# Patient Record
Sex: FEMALE | Race: BLACK OR AFRICAN AMERICAN | Employment: OTHER | URBAN - METROPOLITAN AREA
[De-identification: names, ages, dates, MRNs, and addresses within clinical notes are randomized per-mention and may not be internally consistent; named-entity substitution may affect disease eponyms.]

---

## 2017-11-01 ENCOUNTER — APPOINTMENT (OUTPATIENT)
Dept: CT IMAGING | Age: 82
End: 2017-11-01
Attending: EMERGENCY MEDICINE
Payer: MEDICARE

## 2017-11-01 ENCOUNTER — HOSPITAL ENCOUNTER (EMERGENCY)
Age: 82
Discharge: HOME | End: 2017-11-01
Attending: EMERGENCY MEDICINE
Payer: MEDICARE

## 2017-11-01 VITALS
HEART RATE: 66 BPM | RESPIRATION RATE: 16 BRPM | DIASTOLIC BLOOD PRESSURE: 68 MMHG | SYSTOLIC BLOOD PRESSURE: 132 MMHG | TEMPERATURE: 98.4 F | OXYGEN SATURATION: 100 %

## 2017-11-01 DIAGNOSIS — S09.90XA CLOSED HEAD INJURY, INITIAL ENCOUNTER: Primary | ICD-10-CM

## 2017-11-01 DIAGNOSIS — S00.83XA CONTUSION OF FACE, INITIAL ENCOUNTER: ICD-10-CM

## 2017-11-01 DIAGNOSIS — E86.0 DEHYDRATION: ICD-10-CM

## 2017-11-01 LAB
ALBUMIN SERPL-MCNC: 3.3 G/DL (ref 3.5–5)
ALBUMIN/GLOB SERPL: 1 {RATIO} (ref 1.1–2.2)
ALP SERPL-CCNC: 72 U/L (ref 45–117)
ALT SERPL-CCNC: 20 U/L (ref 12–78)
ANION GAP SERPL CALC-SCNC: 3 MMOL/L (ref 5–15)
APPEARANCE UR: CLEAR
AST SERPL-CCNC: 40 U/L (ref 15–37)
BACTERIA URNS QL MICRO: NEGATIVE /HPF
BASOPHILS # BLD: 0 K/UL (ref 0–0.1)
BASOPHILS NFR BLD: 0 % (ref 0–1)
BILIRUB SERPL-MCNC: 1.5 MG/DL (ref 0.2–1)
BILIRUB UR QL: NEGATIVE
BUN SERPL-MCNC: 33 MG/DL (ref 6–20)
BUN/CREAT SERPL: 32 (ref 12–20)
CALCIUM SERPL-MCNC: 8.5 MG/DL (ref 8.5–10.1)
CHLORIDE SERPL-SCNC: 112 MMOL/L (ref 97–108)
CO2 SERPL-SCNC: 27 MMOL/L (ref 21–32)
COLOR UR: ABNORMAL
CREAT SERPL-MCNC: 1.04 MG/DL (ref 0.55–1.02)
EOSINOPHIL # BLD: 0.2 K/UL (ref 0–0.4)
EOSINOPHIL NFR BLD: 2 % (ref 0–7)
EPITH CASTS URNS QL MICRO: ABNORMAL /LPF
ERYTHROCYTE [DISTWIDTH] IN BLOOD BY AUTOMATED COUNT: 15.6 % (ref 11.5–14.5)
GLOBULIN SER CALC-MCNC: 3.3 G/DL (ref 2–4)
GLUCOSE SERPL-MCNC: 124 MG/DL (ref 65–100)
GLUCOSE UR STRIP.AUTO-MCNC: 500 MG/DL
HCT VFR BLD AUTO: 38.3 % (ref 35–47)
HGB BLD-MCNC: 12.8 G/DL (ref 11.5–16)
HGB UR QL STRIP: NEGATIVE
HYALINE CASTS URNS QL MICRO: ABNORMAL /LPF (ref 0–5)
KETONES UR QL STRIP.AUTO: NEGATIVE MG/DL
LEUKOCYTE ESTERASE UR QL STRIP.AUTO: NEGATIVE
LYMPHOCYTES # BLD: 1.9 K/UL (ref 0.8–3.5)
LYMPHOCYTES NFR BLD: 22 % (ref 12–49)
MCH RBC QN AUTO: 30.7 PG (ref 26–34)
MCHC RBC AUTO-ENTMCNC: 33.4 G/DL (ref 30–36.5)
MCV RBC AUTO: 91.8 FL (ref 80–99)
MONOCYTES # BLD: 0.8 K/UL (ref 0–1)
MONOCYTES NFR BLD: 9 % (ref 5–13)
NEUTS SEG # BLD: 6 K/UL (ref 1.8–8)
NEUTS SEG NFR BLD: 67 % (ref 32–75)
NITRITE UR QL STRIP.AUTO: NEGATIVE
PH UR STRIP: 7 [PH] (ref 5–8)
PLATELET # BLD AUTO: 141 K/UL (ref 150–400)
POTASSIUM SERPL-SCNC: 5.1 MMOL/L (ref 3.5–5.1)
PROT SERPL-MCNC: 6.6 G/DL (ref 6.4–8.2)
PROT UR STRIP-MCNC: 100 MG/DL
RBC # BLD AUTO: 4.17 M/UL (ref 3.8–5.2)
RBC #/AREA URNS HPF: ABNORMAL /HPF (ref 0–5)
SODIUM SERPL-SCNC: 142 MMOL/L (ref 136–145)
SP GR UR REFRACTOMETRY: 1.02 (ref 1–1.03)
UROBILINOGEN UR QL STRIP.AUTO: 0.2 EU/DL (ref 0.2–1)
WBC # BLD AUTO: 8.9 K/UL (ref 3.6–11)
WBC URNS QL MICRO: ABNORMAL /HPF (ref 0–4)

## 2017-11-01 PROCEDURE — 85025 COMPLETE CBC W/AUTO DIFF WBC: CPT | Performed by: EMERGENCY MEDICINE

## 2017-11-01 PROCEDURE — 80053 COMPREHEN METABOLIC PANEL: CPT | Performed by: EMERGENCY MEDICINE

## 2017-11-01 PROCEDURE — 81001 URINALYSIS AUTO W/SCOPE: CPT | Performed by: EMERGENCY MEDICINE

## 2017-11-01 PROCEDURE — 74011250636 HC RX REV CODE- 250/636: Performed by: EMERGENCY MEDICINE

## 2017-11-01 PROCEDURE — 72125 CT NECK SPINE W/O DYE: CPT

## 2017-11-01 PROCEDURE — 99285 EMERGENCY DEPT VISIT HI MDM: CPT

## 2017-11-01 PROCEDURE — 70486 CT MAXILLOFACIAL W/O DYE: CPT

## 2017-11-01 PROCEDURE — 36415 COLL VENOUS BLD VENIPUNCTURE: CPT | Performed by: EMERGENCY MEDICINE

## 2017-11-01 PROCEDURE — 70450 CT HEAD/BRAIN W/O DYE: CPT

## 2017-11-01 PROCEDURE — 74011250637 HC RX REV CODE- 250/637: Performed by: EMERGENCY MEDICINE

## 2017-11-01 PROCEDURE — 96360 HYDRATION IV INFUSION INIT: CPT

## 2017-11-01 RX ORDER — SODIUM CHLORIDE 9 MG/ML
500 INJECTION, SOLUTION INTRAVENOUS ONCE
Status: COMPLETED | OUTPATIENT
Start: 2017-11-01 | End: 2017-11-01

## 2017-11-01 RX ORDER — ACETAMINOPHEN 325 MG/1
650 TABLET ORAL
Status: COMPLETED | OUTPATIENT
Start: 2017-11-01 | End: 2017-11-01

## 2017-11-01 RX ADMIN — ACETAMINOPHEN 650 MG: 325 TABLET ORAL at 13:35

## 2017-11-01 RX ADMIN — SODIUM CHLORIDE 500 ML: 900 INJECTION, SOLUTION INTRAVENOUS at 13:34

## 2017-11-01 NOTE — DISCHARGE INSTRUCTIONS
Dehydration: Care Instructions  Your Care Instructions  Dehydration happens when your body loses too much fluid. This might happen when you do not drink enough water or you lose large amounts of fluids from your body because of diarrhea, vomiting, or sweating. Severe dehydration can be life-threatening. Water and minerals called electrolytes help put your body fluids back in balance. Learn the early signs of fluid loss, and drink more fluids to prevent dehydration. Follow-up care is a key part of your treatment and safety. Be sure to make and go to all appointments, and call your doctor if you are having problems. It's also a good idea to know your test results and keep a list of the medicines you take. How can you care for yourself at home? · To prevent dehydration, drink plenty of fluids, enough so that your urine is light yellow or clear like water. Choose water and other caffeine-free clear liquids until you feel better. If you have kidney, heart, or liver disease and have to limit fluids, talk with your doctor before you increase the amount of fluids you drink. · If you do not feel like eating or drinking, try taking small sips of water, sports drinks, or other rehydration drinks. · Get plenty of rest.  To prevent dehydration  · Add more fluids to your diet and daily routine, unless your doctor has told you not to. · During hot weather, drink more fluids. Drink even more fluids if you exercise a lot. Stay away from drinks with alcohol or caffeine. · Watch for the symptoms of dehydration. These include:  ¨ A dry, sticky mouth. ¨ Dark yellow urine, and not much of it. ¨ Dry and sunken eyes. ¨ Feeling very tired. · Learn what problems can lead to dehydration. These include:  ¨ Diarrhea, fever, and vomiting. ¨ Any illness with a fever, such as pneumonia or the flu. ¨ Activities that cause heavy sweating, such as endurance races and heavy outdoor work in hot or humid weather.   ¨ Alcohol or drug abuse or withdrawal.  ¨ Certain medicines, such as cold and allergy pills (antihistamines), diet pills (diuretics), and laxatives. ¨ Certain diseases, such as diabetes, cancer, and heart or kidney disease. When should you call for help? Call 911 anytime you think you may need emergency care. For example, call if:  ? · You passed out (lost consciousness). ?Call your doctor now or seek immediate medical care if:  ? · You are confused and cannot think clearly. ? · You are dizzy or lightheaded, or you feel like you may faint. ? · You have signs of needing more fluids. You have sunken eyes and a dry mouth, and you pass only a little dark urine. ? · You cannot keep fluids down. ? Watch closely for changes in your health, and be sure to contact your doctor if:  ? · You are not making tears. ? · Your skin is very dry and sags slowly back into place after you pinch it. ? · Your mouth and eyes are very dry. Where can you learn more? Go to http://aliza-ale.info/. Enter T589 in the search box to learn more about \"Dehydration: Care Instructions. \"  Current as of: March 20, 2017  Content Version: 11.4  © 9969-9356 [employer]. Care instructions adapted under license by [employer] (which disclaims liability or warranty for this information). If you have questions about a medical condition or this instruction, always ask your healthcare professional. [place] [address] any warranty or liability for your use of this information. Head Injury: Care Instructions  Your Care Instructions    Most injuries to the head are minor. Bumps, cuts, and scrapes on the head and face usually heal well and can be treated the same as injuries to other parts of the body. Although it's rare, once in a while a more serious problem shows up after you are home. So it's good to be on the lookout for symptoms for a day or two.   Follow-up care is a key part of your treatment and safety. Be sure to make and go to all appointments, and call your doctor if you are having problems. It's also a good idea to know your test results and keep a list of the medicines you take. How can you care for yourself at home? · Follow your doctor's instructions. He or she will tell you if you need someone to watch you closely for the next 24 hours or longer. · Take it easy for the next few days or more if you are not feeling well. · Ask your doctor when it's okay for you to go back to activities like driving a car, riding a bike, or operating machinery. When should you call for help? Call 911 anytime you think you may need emergency care. For example, call if:  ? · You have a seizure. ? · You passed out (lost consciousness). ? · You are confused or can't stay awake. ?Call your doctor now or seek immediate medical care if:  ? · You have new or worse vomiting. ? · You feel less alert. ? · You have new weakness or numbness in any part of your body. ? Watch closely for changes in your health, and be sure to contact your doctor if:  ? · You do not get better as expected. ? · You have new symptoms, such as headaches, trouble concentrating, or changes in mood. Where can you learn more? Go to http://aliza-ale.info/. Enter E604 in the search box to learn more about \"Head Injury: Care Instructions. \"  Current as of: October 14, 2016  Content Version: 11.4  © 0119-3214 [employer]. Care instructions adapted under license by [employer] (which disclaims liability or warranty for this information). If you have questions about a medical condition or this instruction, always ask your healthcare professional. [address] any warranty or liability for your use of this information.

## 2017-11-01 NOTE — ED PROVIDER NOTES
[address]  EMERGENCY DEPARTMENT HISTORY AND PHYSICAL EXAM       Date of Service: 11/1/2017   Patient Name: Stanton Brambila   YOB: 1930  Medical Record Number: [id number]    History of Presenting Illness     Chief Complaint   Patient presents with    Fall     pt fell transferring from chair to wheelchair. Pt hit right side of face on wheelchair. No loc noted. History Provided By:  daughter    Additional History:   Stanton Brambila is a 80 y.o. female with PMhx significant for HTN, DM, TIA, and Alzheimer's disease who presents via EMS to the ED with cc of bruising and swelling over her right eye since GLF yesterday (10/31/17). Per daughter, pt fell forward while being transferred from a chair to her wheelchair yesterday, landing on her right eye on linoleum floor; pt's daughter denies pt lost consciousness. She denies pt is currently taking anticoagulants. Daughter denies pt has had recent illness. She denies pt has had cough, fever, chills, or N/V/D. Social Hx: - Tobacco, - EtOH, - Illicit Drugs    Hx is otherwise limited; pt has hx of dementia. Primary Care Provider: Sheng Rosado MD     Past History     Past Medical History:   Past Medical History:   Diagnosis Date    Alzheimer's dementia     DEMENTIA     [facility] [address]     Hypertension     Stroke ([facility] [address] 2011    TIA        Past Surgical History:   Past Surgical History:   Procedure Laterality Date    HX GYN      hysterectomy        Family History:   History reviewed. No pertinent family history. Social History:   Social History   Substance Use Topics    Smoking status: Never Smoker    Smokeless tobacco: Never Used    Alcohol use No        Allergies:    Allergies   Allergen Reactions    Aspirin Other (comments)     Daugher states no allergy    Pcn [Penicillins] Nausea and Vomiting         Review of Systems   Review of Systems   Unable to perform ROS: Dementia       Physical Exam  Physical Exam    Nursing note and vitals reviewed. General appearance: non-toxic, NAD  Eyes: PERRL, EOMI, conjunctiva normal, anicteric sclera  HEENT: mucous membranes moist, oropharynx is clear; periorbital contusion, ecchymosis, and edema; no septal hematoma; no obvious malocclusion; no crepitus  Pulmonary: clear to auscultation bilaterally  Cardiac: normal rate and regular rhythm, no murmurs, gallops, or rubs, 1+DP pulses, 2+ radial pulses  Abdomen: soft, nontender, nondistended, bowel sounds present  MSK: no pre-tibial edema; no step off or deformity noted  Neuro: moves B/L lower extremities to tactile stimuli; awake, pleasantly demented;  symmetric, has difficulty following commands (due to baseline dementia), face symmetric, tongue appears midline  Skin: capillary refill brisk    Medical Decision Making   I am the first provider for this patient. I reviewed the vital signs, available nursing notes, past medical history, past surgical history, family history and social history. Old Medical Records: Old medical records. Nursing notes. Provider Notes:     DDx: facial contusion vs fx, CHI, ICH, UTI, dehydration    ED Course:  12:02 PM  Initial assessment performed. The patients presenting problems have been discussed, and they are in agreement with the care plan formulated and outlined with them. I have encouraged them to ask questions as they arise throughout their visit.       Diagnostic Study Results     Labs -      Recent Results (from the past 12 hour(s))   CBC WITH AUTOMATED DIFF    Collection Time: 11/01/17  1:49 PM   Result Value Ref Range    WBC 8.9 3.6 - 11.0 K/uL    RBC 4.17 3.80 - 5.20 M/uL    HGB 12.8 11.5 - 16.0 g/dL    HCT 38.3 35.0 - 47.0 %    MCV 91.8 80.0 - 99.0 FL    MCH 30.7 26.0 - 34.0 PG    MCHC 33.4 30.0 - 36.5 g/dL    RDW 15.6 (H) 11.5 - 14.5 %    PLATELET 339 (L) 126 - 400 K/uL    NEUTROPHILS 67 32 - 75 %    LYMPHOCYTES 22 12 - 49 %    MONOCYTES 9 5 - 13 % EOSINOPHILS 2 0 - 7 %    BASOPHILS 0 0 - 1 %    ABS. NEUTROPHILS 6.0 1.8 - 8.0 K/UL    ABS. LYMPHOCYTES 1.9 0.8 - 3.5 K/UL    ABS. MONOCYTES 0.8 0.0 - 1.0 K/UL    ABS. EOSINOPHILS 0.2 0.0 - 0.4 K/UL    ABS. BASOPHILS 0.0 0.0 - 0.1 K/UL   METABOLIC PANEL, COMPREHENSIVE    Collection Time: 11/01/17  1:49 PM   Result Value Ref Range    Sodium 142 136 - 145 mmol/L    Potassium 5.1 3.5 - 5.1 mmol/L    Chloride 112 (H) 97 - 108 mmol/L    CO2 27 21 - 32 mmol/L    Anion gap 3 (L) 5 - 15 mmol/L    Glucose 124 (H) 65 - 100 mg/dL    BUN 33 (H) 6 - 20 MG/DL    Creatinine 1.04 (H) 0.55 - 1.02 MG/DL    BUN/Creatinine ratio 32 (H) 12 - 20      GFR est AA >60 >60 ml/min/1.73m2    GFR est non-AA 50 (L) >60 ml/min/1.73m2    Calcium 8.5 8.5 - 10.1 MG/DL    Bilirubin, total 1.5 (H) 0.2 - 1.0 MG/DL    ALT (SGPT) 20 12 - 78 U/L    AST (SGOT) 40 (H) 15 - 37 U/L    Alk. phosphatase 72 45 - 117 U/L    Protein, total 6.6 6.4 - 8.2 g/dL    Albumin 3.3 (L) 3.5 - 5.0 g/dL    Globulin 3.3 2.0 - 4.0 g/dL    A-G Ratio 1.0 (L) 1.1 - 2.2     URINALYSIS W/MICROSCOPIC    Collection Time: 11/01/17  1:59 PM   Result Value Ref Range    Color YELLOW/STRAW      Appearance CLEAR CLEAR      Specific gravity 1.016 1.003 - 1.030      pH (UA) 7.0 5.0 - 8.0      Protein 100 (A) NEG mg/dL    Glucose 500 (A) NEG mg/dL    Ketone NEGATIVE  NEG mg/dL    Bilirubin NEGATIVE  NEG      Blood NEGATIVE  NEG      Urobilinogen 0.2 0.2 - 1.0 EU/dL    Nitrites NEGATIVE  NEG      Leukocyte Esterase NEGATIVE  NEG      WBC 0-4 0 - 4 /hpf    RBC 0-5 0 - 5 /hpf    Epithelial cells FEW FEW /lpf    Bacteria NEGATIVE  NEG /hpf    Hyaline cast 0-2 0 - 5 /lpf       Radiologic Studies -  The following have been ordered and reviewed:  CT Results  (Last 48 hours)               11/01/17 1303  CT HEAD WO CONT Final result    Impression:  Impression:    1. No evidence of acute infarct or intracranial hemorrhage.    2. Periventricular white matter disease is likely secondary to chronic small vessel ischemic changes. 3. Right frontal scalp soft tissue swelling. Narrative: Indication:  fall out of chair, facial trauma        Comparison: CT 8/16/2011       Findings: 5 mm axial images were obtained from the skull base through the   vertex. CT dose reduction was achieved through the use of a standardized protocol   tailored for this examination and automatic exposure control for dose   modulation. The ventricles and cortical sulci are prominent, compatible with age related   volume loss. There is no evidence of intracranial hemorrhage, mass, mass effect,   or acute infarct. There is periventricular white matter disease. No extra-axial   fluid collections are seen. The visualized paranasal sinuses and mastoid air   cells are clear. The orbital structures are unremarkable. No osseous   abnormalities are seen. There is right frontal scalp soft tissue swelling. 11/01/17 1303  CT MAXILLOFACIAL WO CONT Final result    Impression:  IMPRESSION: Right frontal and supraorbital scalp soft tissue swelling. No   underlying fracture. Narrative:  EXAM:  CT MAXILLOFACIAL WO CONT       INDICATION:   Pain, max-facial; R FACIAL TRAUMA, SWELLING AROUND R ORBIT       COMPARISON:  None. CONTRAST:   None. TECHNIQUE:  Multislice helical CT of the facial bones was performed in the axial   plane without intravenous contrast administration. Coronal and sagittal   reformations were generated. CT dose reduction was achieved through use of a   standardized protocol tailored for this examination and automatic exposure   control for dose modulation. FINDINGS: Study is technically limited by patient motion. There is no facial fracture or other osseous abnormality. There is right frontal   and supraorbital scalp soft tissue swelling. The visualized paranasal sinuses and mastoid air cells are clear.        The globes, optic nerves and extraocular muscles are normal.       No abnormalities are identified within the visualized portions of the brain or   nasopharynx. 11/01/17 1303  CT SPINE CERV WO CONT Final result    Impression:  IMPRESSION:       1. No acute osseous abnormality. 2. Multilevel degenerative spondylosis. Narrative:  INDICATION:   FALL, DEMENTIA, EVAL FOR C SPINE INJURY       COMPARISON: None. TECHNIQUE:   Noncontrast axial CT imaging of the cervical spine was performed. Coronal and sagittal reconstructions were obtained. CT dose reduction was achieved through the use of a standardized protocol   tailored for this examination and automatic exposure control for dose   modulation. FINDINGS:       There is no evidence of acute osseous abnormality. There is no acute alignment   abnormality. Vertebral body heights are maintained. There is no appreciable   prevertebral soft tissue swelling or epidural hematoma. There is multilevel   degenerative spondylosis. There is multilevel neuroforaminal and central canal   stenosis. Evaluation of the paraspinal soft tissues demonstrates no significant   pathology. The visualized lung apices are clear. Vital Signs-Reviewed the patient's vital signs. Patient Vitals for the past 12 hrs:   Temp Pulse Resp BP SpO2   11/01/17 1501 - 66 16 132/68 100 %   11/01/17 1457 98.4 °F (36.9 °C) 64 16 165/88 100 %   11/01/17 1406 98.4 °F (36.9 °C) 67 16 154/87 100 %   11/01/17 1201 - 88 15 159/66 100 %       Medications Given in the ED:  Medications   0.9% sodium chloride infusion 500 mL (0 mL IntraVENous IV Completed 11/1/17 1418)   acetaminophen (TYLENOL) tablet 650 mg (650 mg Oral Given 11/1/17 1335)       Pulse Oximetry Analysis - Normal 100% on RA     Diagnosis   Clinical Impression:   1. Closed head injury, initial encounter    2. Contusion of face, initial encounter    3.  Dehydration         Plan:  1:   Follow-up Information     Follow up With Details Comments Contact Info    Alanna Cummings MD Schedule an appointment as soon as possible for a visit in 2 days  [facility] & [address]  [phone]      [facility] EMERGENCY DEPT Go in 1 day If symptoms worsen [address] [place] [address]  [phone]        2:   Current Discharge Medication List      CONTINUE these medications which have NOT CHANGED    Details   levofloxacin (LEVAQUIN) 750 mg tablet Take 1 Tab by mouth daily. Qty: 7 Tab, Refills: 0      glipiZIDE (GLUCOTROL) 5 mg tablet Take 0.5 Tabs by mouth daily (after breakfast). Do not give if not eating. stop taking glyburide. Qty: 30 Tab, Refills: 0      aspirin delayed-release 81 mg tablet Take 81 mg by mouth daily. [facility]) 625 mg tablet Take 1,875 mg by mouth two (2) times daily (with meals). verapamil (CALAN) 240 mg capsule Take 240 mg by mouth daily. hydrochlorothiazide (HYDRODIURIL) 25 mg tablet Take 25 mg by mouth daily. raloxifene (EVISTA) 60 mg tablet Take  by mouth daily. eszopiclone (LUNESTA) 1 mg tablet Take 3 mg by mouth nightly. quetiapine (SEROQUEL) 50 mg tablet Take 50 mg by mouth two (2) times a day. Return to ED if Worse    Disposition Note:  DISCHARGE NOTE:  3:23 PM  The patient is ready for discharge. The patients signs, symptoms, diagnosis, and instructions for discharge have been discussed and the pt has conveyed their understanding. The patient is to follow up as recommended or return to the ER should their symptoms worsen. Plan has been discussed and patient has conveyed their agreement.  _______________________________   Attestations: This note is prepared by Gilma Bucio, acting as Scribe for Merari Leonard. Aneta Kearney MD.    Merari Leonard. Aneta Kearney MD: The scribe's documentation has been prepared under my direction and personally reviewed by me in its entirety.  I confirm that the note above accurately reflects all work, treatment, procedures, and medical decision making performed by me.  _______________________________

## 2017-11-01 NOTE — ED NOTES
Tim Griffith going over dc instructions with pt and family at this time. Pt to dc home with family. No further questions or concerns at this time.

## 2018-01-01 ENCOUNTER — HOSPITAL ENCOUNTER (EMERGENCY)
Age: 83
Discharge: HOME | End: 2018-10-26
Attending: EMERGENCY MEDICINE
Payer: MEDICARE

## 2018-01-01 ENCOUNTER — APPOINTMENT (OUTPATIENT)
Dept: GENERAL RADIOLOGY | Age: 83
End: 2018-01-01
Attending: EMERGENCY MEDICINE
Payer: MEDICARE

## 2018-01-01 ENCOUNTER — APPOINTMENT (OUTPATIENT)
Dept: CT IMAGING | Age: 83
End: 2018-01-01
Attending: EMERGENCY MEDICINE
Payer: MEDICARE

## 2018-01-01 VITALS
SYSTOLIC BLOOD PRESSURE: 151 MMHG | OXYGEN SATURATION: 99 % | HEART RATE: 67 BPM | HEIGHT: 63 IN | BODY MASS INDEX: 26.58 KG/M2 | WEIGHT: 150 LBS | TEMPERATURE: 97.7 F | DIASTOLIC BLOOD PRESSURE: 94 MMHG | RESPIRATION RATE: 14 BRPM

## 2018-01-01 DIAGNOSIS — R41.82 ALTERED MENTAL STATUS, UNSPECIFIED ALTERED MENTAL STATUS TYPE: Primary | ICD-10-CM

## 2018-01-01 DIAGNOSIS — F03.90 DEMENTIA WITHOUT BEHAVIORAL DISTURBANCE, UNSPECIFIED DEMENTIA TYPE: Chronic | ICD-10-CM

## 2018-01-01 DIAGNOSIS — N30.01 ACUTE CYSTITIS WITH HEMATURIA: ICD-10-CM

## 2018-01-01 LAB
ALBUMIN SERPL-MCNC: 3.1 G/DL (ref 3.5–5)
ALBUMIN/GLOB SERPL: 0.7 {RATIO} (ref 1.1–2.2)
ALP SERPL-CCNC: 50 U/L (ref 45–117)
ALT SERPL-CCNC: 12 U/L (ref 12–78)
ANION GAP SERPL CALC-SCNC: 7 MMOL/L (ref 5–15)
APPEARANCE UR: ABNORMAL
AST SERPL-CCNC: 16 U/L (ref 15–37)
BACTERIA SPEC CULT: ABNORMAL
BACTERIA URNS QL MICRO: ABNORMAL /HPF
BASOPHILS # BLD: 0 K/UL (ref 0–0.1)
BASOPHILS NFR BLD: 0 % (ref 0–1)
BILIRUB SERPL-MCNC: 1.4 MG/DL (ref 0.2–1)
BILIRUB UR QL CFM: POSITIVE
BUN SERPL-MCNC: 19 MG/DL (ref 6–20)
BUN/CREAT SERPL: 31 (ref 12–20)
CALCIUM SERPL-MCNC: 8.7 MG/DL (ref 8.5–10.1)
CC UR VC: ABNORMAL
CHLORIDE SERPL-SCNC: 105 MMOL/L (ref 97–108)
CO2 SERPL-SCNC: 26 MMOL/L (ref 21–32)
COLOR UR: ABNORMAL
CREAT SERPL-MCNC: 0.62 MG/DL (ref 0.55–1.02)
DIFFERENTIAL METHOD BLD: ABNORMAL
EOSINOPHIL # BLD: 0 K/UL (ref 0–0.4)
EOSINOPHIL NFR BLD: 0 % (ref 0–7)
EPITH CASTS URNS QL MICRO: ABNORMAL /LPF
ERYTHROCYTE [DISTWIDTH] IN BLOOD BY AUTOMATED COUNT: 13.4 % (ref 11.5–14.5)
GLOBULIN SER CALC-MCNC: 4.4 G/DL (ref 2–4)
GLUCOSE SERPL-MCNC: 123 MG/DL (ref 65–100)
GLUCOSE UR STRIP.AUTO-MCNC: NEGATIVE MG/DL
HCT VFR BLD AUTO: 34.7 % (ref 35–47)
HGB BLD-MCNC: 11.5 G/DL (ref 11.5–16)
HGB UR QL STRIP: ABNORMAL
IMM GRANULOCYTES # BLD: 0 K/UL (ref 0–0.04)
IMM GRANULOCYTES NFR BLD AUTO: 1 % (ref 0–0.5)
INR PPP: 1 (ref 0.9–1.1)
KETONES UR QL STRIP.AUTO: NEGATIVE MG/DL
LEUKOCYTE ESTERASE UR QL STRIP.AUTO: ABNORMAL
LIPASE SERPL-CCNC: 83 U/L (ref 73–393)
LYMPHOCYTES # BLD: 1 K/UL (ref 0.8–3.5)
LYMPHOCYTES NFR BLD: 13 % (ref 12–49)
MCH RBC QN AUTO: 29.9 PG (ref 26–34)
MCHC RBC AUTO-ENTMCNC: 33.1 G/DL (ref 30–36.5)
MCV RBC AUTO: 90.1 FL (ref 80–99)
MONOCYTES # BLD: 0.7 K/UL (ref 0–1)
MONOCYTES NFR BLD: 8 % (ref 5–13)
NEUTS SEG # BLD: 6.3 K/UL (ref 1.8–8)
NEUTS SEG NFR BLD: 78 % (ref 32–75)
NITRITE UR QL STRIP.AUTO: NEGATIVE
NRBC # BLD: 0 K/UL (ref 0–0.01)
NRBC BLD-RTO: 0 PER 100 WBC
PH UR STRIP: 5.5 [PH] (ref 5–8)
PLATELET # BLD AUTO: 170 K/UL (ref 150–400)
PMV BLD AUTO: 10.7 FL (ref 8.9–12.9)
POTASSIUM SERPL-SCNC: 3.5 MMOL/L (ref 3.5–5.1)
PROT SERPL-MCNC: 7.5 G/DL (ref 6.4–8.2)
PROT UR STRIP-MCNC: ABNORMAL MG/DL
PROTHROMBIN TIME: 10.9 SEC (ref 9–11.1)
RBC # BLD AUTO: 3.85 M/UL (ref 3.8–5.2)
RBC #/AREA URNS HPF: ABNORMAL /HPF (ref 0–5)
SERVICE CMNT-IMP: ABNORMAL
SODIUM SERPL-SCNC: 138 MMOL/L (ref 136–145)
SP GR UR REFRACTOMETRY: 1.02 (ref 1–1.03)
UA: UC IF INDICATED,UAUC: ABNORMAL
UROBILINOGEN UR QL STRIP.AUTO: 2 EU/DL (ref 0.2–1)
WBC # BLD AUTO: 8.1 K/UL (ref 3.6–11)
WBC URNS QL MICRO: ABNORMAL /HPF (ref 0–4)

## 2018-01-01 PROCEDURE — 87086 URINE CULTURE/COLONY COUNT: CPT | Performed by: EMERGENCY MEDICINE

## 2018-01-01 PROCEDURE — 81001 URINALYSIS AUTO W/SCOPE: CPT | Performed by: EMERGENCY MEDICINE

## 2018-01-01 PROCEDURE — 99284 EMERGENCY DEPT VISIT MOD MDM: CPT

## 2018-01-01 PROCEDURE — 87186 SC STD MICRODIL/AGAR DIL: CPT | Performed by: EMERGENCY MEDICINE

## 2018-01-01 PROCEDURE — 70450 CT HEAD/BRAIN W/O DYE: CPT

## 2018-01-01 PROCEDURE — 85025 COMPLETE CBC W/AUTO DIFF WBC: CPT | Performed by: EMERGENCY MEDICINE

## 2018-01-01 PROCEDURE — 83690 ASSAY OF LIPASE: CPT | Performed by: EMERGENCY MEDICINE

## 2018-01-01 PROCEDURE — 96365 THER/PROPH/DIAG IV INF INIT: CPT

## 2018-01-01 PROCEDURE — 74022 RADEX COMPL AQT ABD SERIES: CPT

## 2018-01-01 PROCEDURE — 74011000258 HC RX REV CODE- 258: Performed by: EMERGENCY MEDICINE

## 2018-01-01 PROCEDURE — 74011250636 HC RX REV CODE- 250/636: Performed by: EMERGENCY MEDICINE

## 2018-01-01 PROCEDURE — 87077 CULTURE AEROBIC IDENTIFY: CPT | Performed by: EMERGENCY MEDICINE

## 2018-01-01 PROCEDURE — 85610 PROTHROMBIN TIME: CPT | Performed by: EMERGENCY MEDICINE

## 2018-01-01 PROCEDURE — 36415 COLL VENOUS BLD VENIPUNCTURE: CPT | Performed by: EMERGENCY MEDICINE

## 2018-01-01 PROCEDURE — 80053 COMPREHEN METABOLIC PANEL: CPT | Performed by: EMERGENCY MEDICINE

## 2018-01-01 RX ORDER — CEPHALEXIN 500 MG/1
500 CAPSULE ORAL 3 TIMES DAILY
Qty: 15 CAP | Refills: 0 | Status: SHIPPED | OUTPATIENT
Start: 2018-01-01 | End: 2019-01-01

## 2018-01-01 RX ADMIN — CEFTRIAXONE 1 G: 1 INJECTION, POWDER, FOR SOLUTION INTRAMUSCULAR; INTRAVENOUS at 18:40

## 2018-03-14 ENCOUNTER — HOSPITAL ENCOUNTER (EMERGENCY)
Age: 83
Discharge: HOME | End: 2018-03-14
Attending: EMERGENCY MEDICINE | Admitting: EMERGENCY MEDICINE
Payer: MEDICARE

## 2018-03-14 ENCOUNTER — APPOINTMENT (OUTPATIENT)
Dept: GENERAL RADIOLOGY | Age: 83
End: 2018-03-14
Attending: EMERGENCY MEDICINE
Payer: MEDICARE

## 2018-03-14 VITALS
OXYGEN SATURATION: 99 % | SYSTOLIC BLOOD PRESSURE: 197 MMHG | RESPIRATION RATE: 20 BRPM | BODY MASS INDEX: 25.75 KG/M2 | WEIGHT: 150 LBS | HEART RATE: 74 BPM | DIASTOLIC BLOOD PRESSURE: 95 MMHG | TEMPERATURE: 98.1 F

## 2018-03-14 DIAGNOSIS — N30.01 ACUTE CYSTITIS WITH HEMATURIA: Primary | ICD-10-CM

## 2018-03-14 LAB
ANION GAP SERPL CALC-SCNC: 5 MMOL/L (ref 5–15)
APPEARANCE UR: ABNORMAL
BACTERIA URNS QL MICRO: ABNORMAL /HPF
BILIRUB UR QL CFM: NEGATIVE
BUN SERPL-MCNC: 17 MG/DL (ref 6–20)
BUN/CREAT SERPL: 24 (ref 12–20)
CALCIUM SERPL-MCNC: 8.5 MG/DL (ref 8.5–10.1)
CHLORIDE SERPL-SCNC: 105 MMOL/L (ref 97–108)
CO2 SERPL-SCNC: 29 MMOL/L (ref 21–32)
COLOR UR: ABNORMAL
CREAT SERPL-MCNC: 0.71 MG/DL (ref 0.55–1.02)
EPITH CASTS URNS QL MICRO: ABNORMAL /LPF
ERYTHROCYTE [DISTWIDTH] IN BLOOD BY AUTOMATED COUNT: 13.2 % (ref 11.5–14.5)
GLUCOSE SERPL-MCNC: 174 MG/DL (ref 65–100)
GLUCOSE UR STRIP.AUTO-MCNC: NEGATIVE MG/DL
HCT VFR BLD AUTO: 33.4 % (ref 35–47)
HGB BLD-MCNC: 10.8 G/DL (ref 11.5–16)
HGB UR QL STRIP: NEGATIVE
KETONES UR QL STRIP.AUTO: NEGATIVE MG/DL
LEUKOCYTE ESTERASE UR QL STRIP.AUTO: ABNORMAL
MCH RBC QN AUTO: 29 PG (ref 26–34)
MCHC RBC AUTO-ENTMCNC: 32.3 G/DL (ref 30–36.5)
MCV RBC AUTO: 89.5 FL (ref 80–99)
NITRITE UR QL STRIP.AUTO: NEGATIVE
NRBC # BLD: 0 K/UL (ref 0–0.01)
NRBC BLD-RTO: 0 PER 100 WBC
OTHER,OTHU: ABNORMAL
PH UR STRIP: 6 [PH] (ref 5–8)
PLATELET # BLD AUTO: 194 K/UL (ref 150–400)
PMV BLD AUTO: 10.8 FL (ref 8.9–12.9)
POTASSIUM SERPL-SCNC: 3.3 MMOL/L (ref 3.5–5.1)
PROT UR STRIP-MCNC: NEGATIVE MG/DL
RBC # BLD AUTO: 3.73 M/UL (ref 3.8–5.2)
RBC #/AREA URNS HPF: ABNORMAL /HPF (ref 0–5)
SODIUM SERPL-SCNC: 139 MMOL/L (ref 136–145)
SP GR UR REFRACTOMETRY: 1.02 (ref 1–1.03)
UA: UC IF INDICATED,UAUC: ABNORMAL
UROBILINOGEN UR QL STRIP.AUTO: 1 EU/DL (ref 0.2–1)
WBC # BLD AUTO: 5.6 K/UL (ref 3.6–11)
WBC URNS QL MICRO: ABNORMAL /HPF (ref 0–4)

## 2018-03-14 PROCEDURE — 87086 URINE CULTURE/COLONY COUNT: CPT | Performed by: EMERGENCY MEDICINE

## 2018-03-14 PROCEDURE — 85027 COMPLETE CBC AUTOMATED: CPT | Performed by: EMERGENCY MEDICINE

## 2018-03-14 PROCEDURE — 87186 SC STD MICRODIL/AGAR DIL: CPT | Performed by: EMERGENCY MEDICINE

## 2018-03-14 PROCEDURE — 96365 THER/PROPH/DIAG IV INF INIT: CPT

## 2018-03-14 PROCEDURE — 81001 URINALYSIS AUTO W/SCOPE: CPT | Performed by: EMERGENCY MEDICINE

## 2018-03-14 PROCEDURE — 71045 X-RAY EXAM CHEST 1 VIEW: CPT

## 2018-03-14 PROCEDURE — 36415 COLL VENOUS BLD VENIPUNCTURE: CPT | Performed by: EMERGENCY MEDICINE

## 2018-03-14 PROCEDURE — 74011000258 HC RX REV CODE- 258: Performed by: EMERGENCY MEDICINE

## 2018-03-14 PROCEDURE — 74011250636 HC RX REV CODE- 250/636: Performed by: EMERGENCY MEDICINE

## 2018-03-14 PROCEDURE — 87077 CULTURE AEROBIC IDENTIFY: CPT | Performed by: EMERGENCY MEDICINE

## 2018-03-14 PROCEDURE — 80048 BASIC METABOLIC PNL TOTAL CA: CPT | Performed by: EMERGENCY MEDICINE

## 2018-03-14 PROCEDURE — 99284 EMERGENCY DEPT VISIT MOD MDM: CPT

## 2018-03-14 PROCEDURE — 77030011943

## 2018-03-14 RX ORDER — TRAZODONE HYDROCHLORIDE 50 MG/1
25 TABLET ORAL
COMMUNITY
End: 2019-01-01

## 2018-03-14 RX ORDER — ASPIRIN 325 MG
50000 TABLET, DELAYED RELEASE (ENTERIC COATED) ORAL
COMMUNITY
End: 2019-01-01

## 2018-03-14 RX ORDER — CEPHALEXIN 500 MG/1
500 CAPSULE ORAL 4 TIMES DAILY
Qty: 28 CAP | Refills: 0 | Status: SHIPPED | OUTPATIENT
Start: 2018-03-14 | End: 2018-03-21

## 2018-03-14 RX ADMIN — CEFTRIAXONE 1 G: 1 INJECTION, POWDER, FOR SOLUTION INTRAMUSCULAR; INTRAVENOUS at 16:26

## 2018-03-14 NOTE — ED NOTES
Patient discharged by Dr. Nato Rodarte. Patient provided with discharge instructions Rx and instructions on follow up care. Patient out of ED via wheelchair accompanied by family.

## 2018-03-14 NOTE — DISCHARGE INSTRUCTIONS

## 2018-03-14 NOTE — ED PROVIDER NOTES
EMERGENCY DEPARTMENT HISTORY AND PHYSICAL EXAM      Date: 3/14/2018  Patient Name: Linda Dsouza    History of Presenting Illness     Chief Complaint   Patient presents with    Bladder Infection     Via w/c w/ son & DIL w/ c/o concentrated urine smell, son wants blood work & urine checked. Pt is nonverbal with Alzheimer's at baseline. History Provided By: Patient's Son and Patient's Daughter in law    HPI: Linda Dsouza, 80 y.o. female with PMHx significant for HTN, dementia, DM, TIA, presents via wheelchair to the ED with cc of a UTI today. Son states the pt's urine is more concentrated and would like blood work and urine checked. He also states the pt seems to be flinching upon moving her around. He reports an increase in lethargy and a cough. Daughter in law denies any changes in a mental status. PCP: Sara Root MD    History is limited secondary to the pt having dementia and is nonverbal at baseline. Current Facility-Administered Medications   Medication Dose Route Frequency Provider Last Rate Last Dose    cefTRIAXone (ROCEPHIN) 1 g in 0.9% sodium chloride (MBP/ADV) 50 mL MBP  1 g IntraVENous NOW Sai Freeman  mL/hr at 03/14/18 1626 1 g at 03/14/18 1626     Current Outpatient Prescriptions   Medication Sig Dispense Refill    traZODone (DESYREL) 50 mg tablet Take 25 mg by mouth nightly.  Cholecalciferol, Vitamin D3, 50,000 unit cap Take  by mouth every seven (7) days.  cephALEXin (KEFLEX) 500 mg capsule Take 1 Cap by mouth four (4) times daily for 7 days. 28 Cap 0    aspirin delayed-release 81 mg tablet Take 81 mg by mouth daily.  verapamil (CALAN) 240 mg capsule Take 240 mg by mouth daily.  hydrochlorothiazide (HYDRODIURIL) 25 mg tablet Take 25 mg by mouth daily.          Past History     Past Medical History:  Past Medical History:   Diagnosis Date    Alzheimer's dementia     DEMENTIA     Diabetes ([facility] [address]     Hypertension     Stroke ([facility] [address] 2011    TIA Past Surgical History:  Past Surgical History:   Procedure Laterality Date    HX GYN      hysterectomy       Family History:  History reviewed. No pertinent family history. Social History:  Social History   Substance Use Topics    Smoking status: Never Smoker    Smokeless tobacco: Never Used    Alcohol use No       Allergies: Allergies   Allergen Reactions    Aspirin Other (comments)     Daugher states no allergy    Pcn [Penicillins] Nausea and Vomiting         Review of Systems   Review of Systems   Unable to perform ROS: Dementia       Physical Exam   Physical Exam   Constitutional: She is oriented to person, place, and time. She appears well-developed and well-nourished. Nonverbal, smells of urine   HENT:   Head: Normocephalic and atraumatic. Eyes: Conjunctivae and EOM are normal.   Neck: Normal range of motion. Neck supple. Cardiovascular: Normal rate and regular rhythm. Pulmonary/Chest: Effort normal and breath sounds normal. No respiratory distress. Abdominal: Soft. She exhibits no distension. There is no tenderness. Musculoskeletal: Normal range of motion. Neurological: She is alert and oriented to person, place, and time. Skin: Skin is warm and dry. Psychiatric: She has a normal mood and affect. Nursing note and vitals reviewed.       Diagnostic Study Results     Labs -  Recent Results (from the past 12 hour(s))   URINALYSIS W/ REFLEX CULTURE    Collection Time: 03/14/18  3:31 PM   Result Value Ref Range    Color YELLOW/STRAW      Appearance CLOUDY (A) CLEAR      Specific gravity 1.021 1.003 - 1.030      pH (UA) 6.0 5.0 - 8.0      Protein NEGATIVE  NEG mg/dL    Glucose NEGATIVE  NEG mg/dL    Ketone NEGATIVE  NEG mg/dL    Blood NEGATIVE  NEG      Urobilinogen 1.0 0.2 - 1.0 EU/dL    Nitrites NEGATIVE  NEG      Leukocyte Esterase MODERATE (A) NEG      WBC 10-20 0 - 4 /hpf    RBC 0-5 0 - 5 /hpf    Epithelial cells FEW FEW /lpf    Bacteria 4+ (A) NEG /hpf    UA:UC IF INDICATED URINE CULTURE ORDERED (A) CNI      Other: Renal Epithelial cells Present     METABOLIC PANEL, BASIC    Collection Time: 03/14/18  3:31 PM   Result Value Ref Range    Sodium 139 136 - 145 mmol/L    Potassium 3.3 (L) 3.5 - 5.1 mmol/L    Chloride 105 97 - 108 mmol/L    CO2 29 21 - 32 mmol/L    Anion gap 5 5 - 15 mmol/L    Glucose 174 (H) 65 - 100 mg/dL    BUN 17 6 - 20 MG/DL    Creatinine 0.71 0.55 - 1.02 MG/DL    BUN/Creatinine ratio 24 (H) 12 - 20      GFR est AA >60 >60 ml/min/1.73m2    GFR est non-AA >60 >60 ml/min/1.73m2    Calcium 8.5 8.5 - 10.1 MG/DL   CBC W/O DIFF    Collection Time: 03/14/18  3:31 PM   Result Value Ref Range    WBC 5.6 3.6 - 11.0 K/uL    RBC 3.73 (L) 3.80 - 5.20 M/uL    HGB 10.8 (L) 11.5 - 16.0 g/dL    HCT 33.4 (L) 35.0 - 47.0 %    MCV 89.5 80.0 - 99.0 FL    MCH 29.0 26.0 - 34.0 PG    MCHC 32.3 30.0 - 36.5 g/dL    RDW 13.2 11.5 - 14.5 %    PLATELET 332 308 - 473 K/uL    MPV 10.8 8.9 - 12.9 FL    NRBC 0.0 0  WBC    ABSOLUTE NRBC 0.00 0.00 - 0.01 K/uL   BILIRUBIN, CONFIRM    Collection Time: 03/14/18  3:31 PM   Result Value Ref Range    Bilirubin UA, confirm NEGATIVE  NEG         Radiologic Studies -   CXR Results  (Last 48 hours)               03/14/18 1523  XR CHEST PORT Final result    Impression:  IMPRESSION: No acute infiltrate. Narrative:  Clinical indication: Possible pneumonia. Portable AP semierect view of the chest is obtained, no prior. The inspiration   is shallow. The heart size is prominent. There is no acute infiltrate. Study   limited by lack of patient cooperation. Medical Decision Making   I am the first provider for this patient. I reviewed the vital signs, available nursing notes, past medical history, past surgical history, family history and social history. Vital Signs-Reviewed the patient's vital signs.   Patient Vitals for the past 12 hrs:   Temp Pulse Resp BP SpO2   03/14/18 1401 98.1 °F (36.7 °C) 74 20 (!) 197/95 99 %     Records Reviewed: Nursing Notes and Old Medical Records    Provider Notes (Medical Decision Making):   Patient was presents with family member complaining of a UTI. DDx: Acute cystitis, pyleonephritis, ureteral stone, STI. Will obtain UA. Discussed with the patient diagnosis and test results and all questioned fully answered. They understand the importance of staying well hydrated, taking antibiotics as prescribed to completion and using OTC pyridium as desired. She will PCP if any problems arise. ED Course:   Initial assessment performed. The patients presenting problems have been discussed, and they are in agreement with the care plan formulated and outlined with them. I have encouraged them to ask questions as they arise throughout their visit. Critical Care Time:   0    Disposition:  DISCHARGE NOTE  4:15 PM  The patient has been re-evaluated and is ready for discharge. Reviewed available results with patient. Counseled patient on diagnosis and care plan. Patient has expressed understanding, and all questions have been answered. Patient agrees with plan and agrees to follow up as recommended, or return to the ED if their symptoms worsen. Discharge instructions have been provided and explained to the patient, along with reasons to return to the ED. PLAN:  1. Discharge  Current Discharge Medication List      START taking these medications    Details   cephALEXin (KEFLEX) 500 mg capsule Take 1 Cap by mouth four (4) times daily for 7 days. Qty: 28 Cap, Refills: 0           2. Follow-up Information     Follow up With Details Comments [id number] Merry Marr MD  If symptoms worsen [address] [place]  [phone]          Return to ED if worse     Diagnosis     Clinical Impression:   1. Acute cystitis with hematuria        Attestations: This note is prepared by Chelsea Renae, acting as Scribe for Peter Houston M.D.     Peter Houston M.D: The scribe's documentation has been prepared under my direction and personally reviewed by me in its entirety. I confirm that the note above accurately reflects all work, treatment, procedures, and medical decision making performed by me.

## 2018-03-14 NOTE — ED TRIAGE NOTES
Assumed care of this patient. She is alert, but not oriented. Patient has baseline alzheimers and has limited vocabulary. Patient's son at bedside reports that patient has been experiencing increased fatigue, cough, wincing when moved, and strong urine odor.

## 2018-03-16 LAB
BACTERIA SPEC CULT: ABNORMAL
CC UR VC: ABNORMAL
SERVICE CMNT-IMP: ABNORMAL

## 2018-10-26 NOTE — ED NOTES
ER MD discharged patient. Patients family verbalized an understanding of discharge paperwork and has no questions at the time of discharge.

## 2018-10-26 NOTE — ED PROVIDER NOTES
EMERGENCY DEPARTMENT HISTORY AND PHYSICAL EXAM 
 
 
Date: 10/26/2018 Patient Name: Leobardo Watts History of Presenting Illness Chief Complaint Patient presents with  Lethargy Arrives via [facility] accompanied by family for \"not acting herself\" x today Pt family reports pt didn't sleep last night Hx of UTI History Provided By: Patient's Son and Patient's Daughter HPI: Leobardo Watts, 80 y.o. female with PMHx significant for HTN, dementia, DM, alzheimer's, CVA, presents via EMS to the ED for evaluation of new onset lethargy x 0630 this morning. Son states that pt woke up \"unusual\" this morning. He is concerned because she looks as though she has pain, specifically when moving/transporting her. He noticed this at around 0630 this monring. Pt has not hit her head to son's knowledge nor does she take any blood thinners. Daughter-in-law notes that she has intermittently been grabbing at her genitals and making a grunting noise, which is what she does when she is in pain. She reports that pt acts like this when she has a UTI. Pt has advanced dementia and is not able to speak at all. Denies fever, nausea, vomiting, diarrhea. Daughter-in-law states that pt has had a dry cough recently. Denies smoking cigarettes. No pertinent familial medical hx. History is limited due to pt's dementia PCP: Ricardo Mo MD 
 
Current Facility-Administered Medications Medication Dose Route Frequency Provider Last Rate Last Dose  cefTRIAXone (ROCEPHIN) 1 g in 0.9% sodium chloride (MBP/ADV) 50 mL  1 g IntraVENous NOW Tess Torres MD      
 
Current Outpatient Medications Medication Sig Dispense Refill  cephALEXin (KEFLEX) 500 mg capsule Take 1 Cap by mouth three (3) times daily. 15 Cap 0  
 traZODone (DESYREL) 50 mg tablet Take 25 mg by mouth nightly.  Cholecalciferol, Vitamin D3, 50,000 unit cap Take  by mouth every seven (7) days.  aspirin delayed-release 81 mg tablet Take 81 mg by mouth daily.  verapamil (CALAN) 240 mg capsule Take 240 mg by mouth daily.  hydrochlorothiazide (HYDRODIURIL) 25 mg tablet Take 25 mg by mouth daily. Past History Past Medical History: 
Past Medical History:  
Diagnosis Date  Alzheimer's dementia  Dementia  Diabetes ([facility] [address]  Hypertension  Stroke [facility] 2011 TIA Past Surgical History: 
Past Surgical History:  
Procedure Laterality Date  HX GYN    
 hysterectomy Family History: No family history on file. Social History: 
Social History Tobacco Use  Smoking status: Never Smoker  Smokeless tobacco: Never Used Substance Use Topics  Alcohol use: No  
 Drug use: No  
 
 
Allergies: Allergies Allergen Reactions  Aspirin Other (comments) Daugher states no allergy  Pcn [Penicillins] Nausea and Vomiting Review of Systems Review of Systems Unable to perform ROS: Dementia Physical Exam  
Physical Exam  
Constitutional: She appears well-developed and well-nourished. No distress. HENT:  
Head: Normocephalic and atraumatic. Eyes: EOM are normal. Pupils are equal, round, and reactive to light. Neck: Normal range of motion. Neck supple. Cardiovascular: Normal rate, regular rhythm and normal heart sounds. Pulmonary/Chest: Effort normal and breath sounds normal. No respiratory distress. Lungs clear Abdominal: Soft. Bowel sounds are normal. She exhibits mass (probable abd wall hernia ttp, appears to be reducible). There is no tenderness. Musculoskeletal: Normal range of motion. She exhibits no edema. no bony tenderness Neurological: Coordination normal.  
Initially appeared lethargic but became more alert during exam, opens eyes spontaneously to touch, appears to be moving all extremities, A&Ox zero which is baseline Skin: Skin is warm and dry. Psychiatric: She has a normal mood and affect. Her behavior is normal.  
Nursing note and vitals reviewed. Diagnostic Study Results Labs - Recent Results (from the past 12 hour(s)) CBC WITH AUTOMATED DIFF Collection Time: 10/26/18  4:07 PM  
Result Value Ref Range WBC 8.1 3.6 - 11.0 K/uL  
 RBC 3.85 3.80 - 5.20 M/uL  
 HGB 11.5 11.5 - 16.0 g/dL HCT 34.7 (L) 35.0 - 47.0 % MCV 90.1 80.0 - 99.0 FL  
 MCH 29.9 26.0 - 34.0 PG  
 MCHC 33.1 30.0 - 36.5 g/dL  
 RDW 13.4 11.5 - 14.5 % PLATELET 259 448 - 904 K/uL MPV 10.7 8.9 - 12.9 FL  
 NRBC 0.0 0  WBC ABSOLUTE NRBC 0.00 0.00 - 0.01 K/uL NEUTROPHILS 78 (H) 32 - 75 % LYMPHOCYTES 13 12 - 49 % MONOCYTES 8 5 - 13 % EOSINOPHILS 0 0 - 7 % BASOPHILS 0 0 - 1 % IMMATURE GRANULOCYTES 1 (H) 0.0 - 0.5 % ABS. NEUTROPHILS 6.3 1.8 - 8.0 K/UL  
 ABS. LYMPHOCYTES 1.0 0.8 - 3.5 K/UL  
 ABS. MONOCYTES 0.7 0.0 - 1.0 K/UL  
 ABS. EOSINOPHILS 0.0 0.0 - 0.4 K/UL  
 ABS. BASOPHILS 0.0 0.0 - 0.1 K/UL  
 ABS. IMM. GRANS. 0.0 0.00 - 0.04 K/UL  
 DF AUTOMATED METABOLIC PANEL, COMPREHENSIVE Collection Time: 10/26/18  4:07 PM  
Result Value Ref Range Sodium 138 136 - 145 mmol/L Potassium 3.5 3.5 - 5.1 mmol/L Chloride 105 97 - 108 mmol/L  
 CO2 26 21 - 32 mmol/L Anion gap 7 5 - 15 mmol/L Glucose 123 (H) 65 - 100 mg/dL BUN 19 6 - 20 MG/DL Creatinine 0.62 0.55 - 1.02 MG/DL  
 BUN/Creatinine ratio 31 (H) 12 - 20 GFR est AA >60 >60 ml/min/1.73m2 GFR est non-AA >60 >60 ml/min/1.73m2 Calcium 8.7 8.5 - 10.1 MG/DL Bilirubin, total 1.4 (H) 0.2 - 1.0 MG/DL  
 ALT (SGPT) 12 12 - 78 U/L  
 AST (SGOT) 16 15 - 37 U/L Alk. phosphatase 50 45 - 117 U/L Protein, total 7.5 6.4 - 8.2 g/dL Albumin 3.1 (L) 3.5 - 5.0 g/dL Globulin 4.4 (H) 2.0 - 4.0 g/dL A-G Ratio 0.7 (L) 1.1 - 2.2 URINALYSIS W/ REFLEX CULTURE Collection Time: 10/26/18  4:18 PM  
Result Value Ref Range Color DARK YELLOW Appearance CLOUDY (A) CLEAR Specific gravity 1.023 1.003 - 1.030    
 pH (UA) 5.5 5.0 - 8.0 Protein TRACE (A) NEG mg/dL Glucose NEGATIVE  NEG mg/dL Ketone NEGATIVE  NEG mg/dL Blood TRACE (A) NEG Urobilinogen 2.0 (H) 0.2 - 1.0 EU/dL Nitrites NEGATIVE  NEG Leukocyte Esterase MODERATE (A) NEG    
 WBC 5-10 0 - 4 /hpf  
 RBC 0-5 0 - 5 /hpf Epithelial cells FEW FEW /lpf Bacteria 4+ (A) NEG /hpf  
 UA:UC IF INDICATED URINE CULTURE ORDERED (A) CNI    
BILIRUBIN, CONFIRM Collection Time: 10/26/18  4:18 PM  
Result Value Ref Range Bilirubin UA, confirm POSITIVE (A) NEG    
LIPASE Collection Time: 10/26/18  4:21 PM  
Result Value Ref Range Lipase 83 73 - 393 U/L  
PROTHROMBIN TIME + INR Collection Time: 10/26/18  4:21 PM  
Result Value Ref Range INR 1.0 0.9 - 1.1 Prothrombin time 10.9 9.0 - 11.1 sec Radiologic Studies -  
CT HEAD WO CONT Final Result XR ABD ACUTE W 1 V CHEST Final Result CT Results  (Last 48 hours) 10/26/18 1739  CT HEAD WO CONT Final result Impression:  IMPRESSION:   
   
No acute intracranial abnormality on this noncontrast head CT. No intracranial  
change. Narrative:  EXAM:  CT HEAD WO CONT INDICATION: Lethargy, abnormal behavior, insomnia, previous UTI. Chronic  
dementia. COMPARISON: CT head on 11/1/2017. TECHNIQUE: Noncontrast head CT. Coronal and sagittal reformats. CT dose  
reduction was achieved through the use of a standardized protocol tailored for  
this examination and automatic exposure control for dose modulation. FINDINGS: The ventricles and sulci are age-appropriate without hydrocephalus. There is no mass effect or midline shift. There is no intracranial hemorrhage or  
extra-axial fluid collection. Chronic microvascular ischemic disease is  
unchanged. No CT evidence of acute infarct. The calvarium is intact. Left mastoid air cell partial opacification is  
unchanged. No CT evidence of acute sinusitis. CXR Results  (Last 48 hours) 10/26/18 1747  XR ABD ACUTE W 1 V CHEST Final result Impression:  IMPRESSION:   
Nonobstructive bowel gas pattern without evidence of free air. Narrative:  EXAM:  Acute abdominal series INDICATION: Lethargy, insomnia, abnormal behavior, abdominal pain, dementia. Hysterectomy. COMPARISON: Portable chest on 3/14/2018. TECHNIQUE: 6 images of Supine chest and abdomen and lateral decubitus abdomen  
radiographs (acute abdominal series). FINDINGS: Cardiomegaly is unchanged. The lungs and pleural spaces are clear. Bones are osteopenic. There is scattered air within the colon and small bowel. No bowel dilatation to  
suggest obstruction. No evidence of free intraperitoneal air. No calcification projected over the kidneys. Mild bilateral hip osteoarthritis. Medical Decision Making I am the first provider for this patient. I reviewed the vital signs, available nursing notes, past medical history, past surgical history, family history and social history. Vital Signs-Reviewed the patient's vital signs. Patient Vitals for the past 12 hrs: 
 Temp Pulse Resp BP SpO2  
10/26/18 1441 97.7 °F (36.5 °C) 67 14 (!) 151/94 99 % Records Reviewed: Nursing Notes and Old Medical Records Provider Notes (Medical Decision Making): DDx: UTI, metabolic encephalopathy, CVA, ICH, electrolyte abnormality, PNA, obstruction ED Course:  
Initial assessment performed. The patients presenting problems have been discussed, and they are in agreement with the care plan formulated and outlined with them. I have encouraged them to ask questions as they arise throughout their visit.  
 
 
Disposition: 
DISCHARGE NOTE: 
6:41 PM 
 Pt has been reexamined. Pt has no new complaints, changes, or physical findings. Care plan outlined and precautions discussed. All available results reviewed with pt. All medications reviewed with pt. All of pts questions and concerns addressed. Pt agrees to f/u as instructed and agrees to return to ED upon further deterioration. Pt is ready to go home. Written by Adriano Nowak ED Scribe as dictated by Smita Abrams MD 
 
PLAN: 
1. Current Discharge Medication List  
  
START taking these medications Details  
cephALEXin (KEFLEX) 500 mg capsule Take 1 Cap by mouth three (3) times daily. Qty: 15 Cap, Refills: 0  
  
  
 
2. Follow-up Information Follow up With Specialties Details Why Contact Info Jerry Mason MD Family Practice In 3 days As needed [address] [facility] 
[phone] [facility] EMERGENCY DEPT Emergency Medicine  If symptoms worsen [address] [place] [address] 
[phone] Return to ED if worse Diagnosis Clinical Impression: 1. Altered mental status, unspecified altered mental status type 2. Acute cystitis with hematuria 3. Dementia without behavioral disturbance, unspecified dementia type Attestations: This note is prepared by Adriano Nowak acting as scribe for MD Smita Bustamante MD : The scribe's documentation has been prepared under my direction and personally reviewed by me in its entirety. I confirm that the note above accurately reflects all work, treatment, procedures, and medical decision making performed by me.

## 2018-10-26 NOTE — DISCHARGE INSTRUCTIONS
Altered Mental Status: Care Instructions  Your Care Instructions  Altered mental status is a change in how well your brain is working. As a result, you may be confused, be less alert than usual, or act in odd ways. This may include seeing or hearing things that aren't really there (hallucinations). A mental status change has many possible causes. For example, it may be the result of an infection, an imbalance of chemicals in the body, or a chronic disease such as diabetes or COPD. It can also be caused by things such as a head injury, taking certain medicines, or using alcohol or drugs. The doctor may do tests to look for the cause. These tests may include urine tests, blood tests, and imaging tests such as a CT scan. Sometimes a clear cause isn't found. But tests can help the doctor rule out a serious cause of your symptoms. A change in mental status can be scary. But mental status will often return to normal when the cause is treated. So it is important to get any follow-up testing or treatment the doctor has suggested. The doctor has checked you carefully, but problems can develop later. If you notice any problems or new symptoms, get medical treatment right away. Follow-up care is a key part of your treatment and safety. Be sure to make and go to all appointments, and call your doctor if you are having problems. It's also a good idea to know your test results and keep a list of the medicines you take. How can you care for yourself at home? · Be safe with medicines. Take your medicines exactly as prescribed. Call your doctor if you think you are having a problem with your medicine. · Have another adult stay with you until you are better. This can help keep you safe. Ask that person to watch for signs that your mental status is getting worse. When should you call for help? Call 911 anytime you think you may need emergency care. For example, call if:  · You passed out (lost consciousness).   Call your doctor now or seek immediate medical care if:  · Your mental status is getting worse. · You have new symptoms, such as a fever, chills, or shortness of breath. · You do not feel safe. Watch closely for changes in your health, and be sure to contact your doctor if:  · You do not get better as expected. Where can you learn more? Go to Ask The Doctor.be  Enter J452 in the search box to learn more about \"Altered Mental Status: Care Instructions. \"   © 3858-6720 Healthwise, [employer]. Care instructions adapted under license by [employer] (which disclaims liability or warranty for this information). This care instruction is for use with your licensed healthcare professional. If you have questions about a medical condition or this instruction, always ask your healthcare professional. [address] any warranty or liability for your use of this information. Content Version: 50.5.291832; Current as of: November 20, 2015             Dementia: Care Instructions  Your Care Instructions    Dementia is a loss of mental skills that affects your daily life. It is different than the occasional trouble with memory that is part of aging. You may find it hard to remember things that you feel you should be able to remember. Or you may feel that your mind is just not working as well as usual.  Finding out that you have dementia is a shock. You may be afraid and worried about how the condition will change your life. Although there is no cure at this time, medicine may slow memory loss and improve thinking for a while. Other medicines may be able to help you sleep or cope with depression and behavior changes. Dementia often gets worse slowly. But it can get worse quickly. As dementia gets worse, it may become harder to do common things that take planning, like making a list and going shopping. Over time, the disease may make it hard for you to take care of yourself.  Some people with dementia need others to help care for them. Dementia is different for everyone. You may be able to function well for a long time. In the early stage of the condition, you can do things at home to make life easier and safer. You also can keep doing your hobbies and other activities. Many people find comfort in planning now for their future needs. Follow-up care is a key part of your treatment and safety. Be sure to make and go to all appointments, and call your doctor if you are having problems. It's also a good idea to know your test results and keep a list of the medicines you take. How can you care for yourself at home? · Take your medicines exactly as prescribed. Call your doctor if you think you are having a problem with your medicine. · Eat healthy foods. Eat lots of whole grains, fruits, and vegetables every day. If you are not hungry, try snacks or nutritional drinks such as Boost, Ensure, or Sustacal.  · If you have problems sleeping:  ? Try not to nap too close to your bedtime. ? Exercise regularly. Walking is a good choice. ? Try a glass of warm milk or caffeine-free herbal tea before bed. · Do tasks and activities during the time of day when you feel your best. It may help to develop a daily routine. · Post labels, lists, and sticky notes to help you remember things. Write your activities on a calendar you can easily find. Put your clock where you can easily see it. · Stay active. Take walks in familiar places, or with friends or loved ones. Try to stay active mentally too. Read and work crossword puzzles if you enjoy these activities. · Do not drive unless you can pass an on-road driving test. If you are not sure if you are safe to drive, your state 's license bureau can test you. · Keep a cordless phone and a flashlight with new batteries by your bed. If possible, put a phone in each of the main rooms of your house, or carry a cell phone in case you fall and cannot reach a phone.  Or, you can wear a device around your neck or wrist. You push a button that sends a signal for help. Acknowledge your emotions and plan for the future  · Talk openly and honestly with your doctor. · Let yourself grieve. It is common to feel angry, scared, frustrated, anxious, or depressed. · Get emotional support from family, friends, a support group, or a counselor experienced in working with people who have dementia. · Ask for help if you need it. · Plan for the future. ? Talk to your family and doctor about preparing a living will and other important papers while you can make decisions. These papers tell your doctors how to care for you at the end of your life. ? Consider naming a person to make decisions about your care if you are not able to. When should you call for help? Call 911 anytime you think you may need emergency care. For example, call if:    · You are lost and do not know whom to call.     · You are injured and do not know whom to call.    Call your doctor now or seek immediate medical care if:    · You are more confused or upset than usual.     · You feel like you could hurt yourself because your mind is not working well.   Salvador Rodriguez closely for changes in your health, and be sure to contact your doctor if you have any problems. Where can you learn more? Go to http://aliza-ale.info/. Enter F786 in the search box to learn more about \"Dementia: Care Instructions. \"  Current as of: December 7, 2017  Content Version: 11.8  © 3931-3001 Healthwise, Incorporated. Care instructions adapted under license by [employer] (which disclaims liability or warranty for this information). If you have questions about a medical condition or this instruction, always ask your healthcare professional. [place] [address] any warranty or liability for your use of this information.            Urinary Tract Infection in Women: Care Instructions  Your Care Instructions    A urinary tract infection, or UTI, is a general term for an infection anywhere between the kidneys and the urethra (where urine comes out). Most UTIs are bladder infections. They often cause pain or burning when you urinate. UTIs are caused by bacteria and can be cured with antibiotics. Be sure to complete your treatment so that the infection goes away. Follow-up care is a key part of your treatment and safety. Be sure to make and go to all appointments, and call your doctor if you are having problems. It's also a good idea to know your test results and keep a list of the medicines you take. How can you care for yourself at home? · Take your antibiotics as directed. Do not stop taking them just because you feel better. You need to take the full course of antibiotics. · Drink extra water and other fluids for the next day or two. This may help wash out the bacteria that are causing the infection. (If you have kidney, heart, or liver disease and have to limit fluids, talk with your doctor before you increase your fluid intake.)  · Avoid drinks that are carbonated or have caffeine. They can irritate the bladder. · Urinate often. Try to empty your bladder each time. · To relieve pain, take a hot bath or lay a heating pad set on low over your lower belly or genital area. Never go to sleep with a heating pad in place. To prevent UTIs  · Drink plenty of water each day. This helps you urinate often, which clears bacteria from your system. (If you have kidney, heart, or liver disease and have to limit fluids, talk with your doctor before you increase your fluid intake.)  · Urinate when you need to. · Urinate right after you have sex. · Change sanitary pads often. · Avoid douches, bubble baths, feminine hygiene sprays, and other feminine hygiene products that have deodorants. · After going to the bathroom, wipe from front to back. When should you call for help?   Call your doctor now or seek immediate medical care if:    · Symptoms such as fever, chills, nausea, or vomiting get worse or appear for the first time.     · You have new pain in your back just below your rib cage. This is called flank pain.     · There is new blood or pus in your urine.     · You have any problems with your antibiotic medicine.    Watch closely for changes in your health, and be sure to contact your doctor if:    · You are not getting better after taking an antibiotic for 2 days.     · Your symptoms go away but then come back. Where can you learn more? Go to http://aliza-ale.info/. Enter Q035 in the search box to learn more about \"Urinary Tract Infection in Women: Care Instructions. \"  Current as of: March 21, 2018  Content Version: 11.8  © 8943-7093 Healthwise, Incorporated. Care instructions adapted under license by [facility] [facility] (which disclaims liability or warranty for this information). If you have questions about a medical condition or this instruction, always ask your healthcare professional. [place] [address] any warranty or liability for your use of this information.

## 2019-01-01 ENCOUNTER — HOME CARE VISIT (OUTPATIENT)
Dept: HOSPICE | Facility: HOSPICE | Age: 84
End: 2019-01-01
Payer: MEDICARE

## 2019-01-01 ENCOUNTER — APPOINTMENT (OUTPATIENT)
Dept: GENERAL RADIOLOGY | Age: 84
DRG: 871 | End: 2019-01-01
Attending: INTERNAL MEDICINE
Payer: MEDICARE

## 2019-01-01 ENCOUNTER — APPOINTMENT (OUTPATIENT)
Dept: GENERAL RADIOLOGY | Age: 84
DRG: 871 | End: 2019-01-01
Attending: NEUROMUSCULOSKELETAL MEDICINE & OMM
Payer: MEDICARE

## 2019-01-01 ENCOUNTER — APPOINTMENT (OUTPATIENT)
Dept: NON INVASIVE DIAGNOSTICS | Age: 84
DRG: 871 | End: 2019-01-01
Attending: NURSE PRACTITIONER
Payer: MEDICARE

## 2019-01-01 ENCOUNTER — HOSPITAL ENCOUNTER (INPATIENT)
Age: 84
LOS: 2 days | DRG: 951 | End: 2019-03-24
Attending: INTERNAL MEDICINE | Admitting: INTERNAL MEDICINE
Payer: OTHER MISCELLANEOUS

## 2019-01-01 ENCOUNTER — HOSPITAL ENCOUNTER (INPATIENT)
Age: 84
LOS: 8 days | Discharge: HOSPICE | DRG: 871 | End: 2019-03-22
Attending: EMERGENCY MEDICINE | Admitting: HOSPITALIST
Payer: MEDICARE

## 2019-01-01 ENCOUNTER — HOSPICE ADMISSION (OUTPATIENT)
Dept: HOSPICE | Facility: HOSPICE | Age: 84
End: 2019-01-01
Payer: MEDICARE

## 2019-01-01 ENCOUNTER — APPOINTMENT (OUTPATIENT)
Dept: GENERAL RADIOLOGY | Age: 84
DRG: 871 | End: 2019-01-01
Attending: EMERGENCY MEDICINE
Payer: MEDICARE

## 2019-01-01 ENCOUNTER — APPOINTMENT (OUTPATIENT)
Dept: CT IMAGING | Age: 84
DRG: 871 | End: 2019-01-01
Attending: EMERGENCY MEDICINE
Payer: MEDICARE

## 2019-01-01 ENCOUNTER — APPOINTMENT (OUTPATIENT)
Dept: GENERAL RADIOLOGY | Age: 84
DRG: 871 | End: 2019-01-01
Attending: SURGERY
Payer: MEDICARE

## 2019-01-01 VITALS
OXYGEN SATURATION: 100 % | HEART RATE: 114 BPM | TEMPERATURE: 99 F | HEIGHT: 66 IN | SYSTOLIC BLOOD PRESSURE: 156 MMHG | WEIGHT: 116 LBS | RESPIRATION RATE: 24 BRPM | BODY MASS INDEX: 18.64 KG/M2 | DIASTOLIC BLOOD PRESSURE: 98 MMHG

## 2019-01-01 VITALS
TEMPERATURE: 99 F | HEART RATE: 114 BPM | SYSTOLIC BLOOD PRESSURE: 156 MMHG | BODY MASS INDEX: 18.78 KG/M2 | DIASTOLIC BLOOD PRESSURE: 98 MMHG | OXYGEN SATURATION: 100 % | RESPIRATION RATE: 24 BRPM | HEIGHT: 66 IN | WEIGHT: 116.84 LBS

## 2019-01-01 VITALS
HEART RATE: 125 BPM | TEMPERATURE: 97.2 F | OXYGEN SATURATION: 96 % | SYSTOLIC BLOOD PRESSURE: 121 MMHG | RESPIRATION RATE: 18 BRPM | DIASTOLIC BLOOD PRESSURE: 89 MMHG

## 2019-01-01 DIAGNOSIS — J18.9 PNEUMONIA OF RIGHT LUNG DUE TO INFECTIOUS ORGANISM, UNSPECIFIED PART OF LUNG: Primary | ICD-10-CM

## 2019-01-01 DIAGNOSIS — R00.0 TACHYCARDIA: ICD-10-CM

## 2019-01-01 DIAGNOSIS — A41.9 CLINICAL SEPSIS (HCC): ICD-10-CM

## 2019-01-01 DIAGNOSIS — J96.01 ACUTE RESPIRATORY FAILURE WITH HYPOXIA (HCC): ICD-10-CM

## 2019-01-01 DIAGNOSIS — J69.0 ASPIRATION PNEUMONIA OF RIGHT LOWER LOBE, UNSPECIFIED ASPIRATION PNEUMONIA TYPE (HCC): ICD-10-CM

## 2019-01-01 DIAGNOSIS — Z71.89 COUNSELING REGARDING ADVANCED CARE PLANNING AND GOALS OF CARE: ICD-10-CM

## 2019-01-01 DIAGNOSIS — F03.90 DEMENTIA WITHOUT BEHAVIORAL DISTURBANCE, UNSPECIFIED DEMENTIA TYPE: Chronic | ICD-10-CM

## 2019-01-01 DIAGNOSIS — A41.9 SEPSIS, DUE TO UNSPECIFIED ORGANISM: ICD-10-CM

## 2019-01-01 DIAGNOSIS — K43.9 VENTRAL HERNIA WITHOUT OBSTRUCTION OR GANGRENE: ICD-10-CM

## 2019-01-01 DIAGNOSIS — R45.1 RESTLESSNESS AND AGITATION: ICD-10-CM

## 2019-01-01 DIAGNOSIS — R13.12 OROPHARYNGEAL DYSPHAGIA: ICD-10-CM

## 2019-01-01 LAB
ALBUMIN SERPL-MCNC: 1.6 G/DL (ref 3.5–5)
ALBUMIN SERPL-MCNC: 1.7 G/DL (ref 3.5–5)
ALBUMIN SERPL-MCNC: 1.7 G/DL (ref 3.5–5)
ALBUMIN SERPL-MCNC: 2.5 G/DL (ref 3.5–5)
ALBUMIN/GLOB SERPL: 0.4 {RATIO} (ref 1.1–2.2)
ALBUMIN/GLOB SERPL: 0.5 {RATIO} (ref 1.1–2.2)
ALBUMIN/GLOB SERPL: 0.5 {RATIO} (ref 1.1–2.2)
ALBUMIN/GLOB SERPL: 0.6 {RATIO} (ref 1.1–2.2)
ALP SERPL-CCNC: 42 U/L (ref 45–117)
ALP SERPL-CCNC: 44 U/L (ref 45–117)
ALP SERPL-CCNC: 48 U/L (ref 45–117)
ALP SERPL-CCNC: 55 U/L (ref 45–117)
ALT SERPL-CCNC: 10 U/L (ref 12–78)
ALT SERPL-CCNC: 13 U/L (ref 12–78)
ALT SERPL-CCNC: 15 U/L (ref 12–78)
ALT SERPL-CCNC: 19 U/L (ref 12–78)
AMORPH CRY URNS QL MICRO: ABNORMAL
ANION GAP SERPL CALC-SCNC: 10 MMOL/L (ref 5–15)
ANION GAP SERPL CALC-SCNC: 11 MMOL/L (ref 5–15)
ANION GAP SERPL CALC-SCNC: 11 MMOL/L (ref 5–15)
ANION GAP SERPL CALC-SCNC: 7 MMOL/L (ref 5–15)
ANION GAP SERPL CALC-SCNC: 8 MMOL/L (ref 5–15)
ANION GAP SERPL CALC-SCNC: 9 MMOL/L (ref 5–15)
APPEARANCE UR: ABNORMAL
ARTERIAL PATENCY WRIST A: ABNORMAL
ARTERIAL PATENCY WRIST A: YES
AST SERPL-CCNC: 16 U/L (ref 15–37)
AST SERPL-CCNC: 18 U/L (ref 15–37)
AST SERPL-CCNC: 20 U/L (ref 15–37)
AST SERPL-CCNC: 29 U/L (ref 15–37)
ATRIAL RATE: 132 BPM
ATRIAL RATE: 133 BPM
ATRIAL RATE: 133 BPM
ATRIAL RATE: 248 BPM
ATRIAL RATE: 68 BPM
BACTERIA SPEC CULT: ABNORMAL
BACTERIA SPEC CULT: NORMAL
BACTERIA SPEC CULT: NORMAL
BACTERIA URNS QL MICRO: ABNORMAL /HPF
BASE DEFICIT BLD-SCNC: 3 MMOL/L
BASE DEFICIT BLD-SCNC: 5 MMOL/L
BASOPHILS # BLD: 0 K/UL (ref 0–0.1)
BASOPHILS NFR BLD: 0 % (ref 0–1)
BDY SITE: ABNORMAL
BDY SITE: ABNORMAL
BILIRUB SERPL-MCNC: 1 MG/DL (ref 0.2–1)
BILIRUB SERPL-MCNC: 1.2 MG/DL (ref 0.2–1)
BILIRUB SERPL-MCNC: 1.7 MG/DL (ref 0.2–1)
BILIRUB SERPL-MCNC: 2.2 MG/DL (ref 0.2–1)
BILIRUB UR QL CFM: POSITIVE
BUN SERPL-MCNC: 10 MG/DL (ref 6–20)
BUN SERPL-MCNC: 10 MG/DL (ref 6–20)
BUN SERPL-MCNC: 11 MG/DL (ref 6–20)
BUN SERPL-MCNC: 15 MG/DL (ref 6–20)
BUN SERPL-MCNC: 25 MG/DL (ref 6–20)
BUN SERPL-MCNC: 6 MG/DL (ref 6–20)
BUN SERPL-MCNC: 9 MG/DL (ref 6–20)
BUN SERPL-MCNC: 9 MG/DL (ref 6–20)
BUN/CREAT SERPL: 16 (ref 12–20)
BUN/CREAT SERPL: 21 (ref 12–20)
BUN/CREAT SERPL: 23 (ref 12–20)
BUN/CREAT SERPL: 27 (ref 12–20)
BUN/CREAT SERPL: 28 (ref 12–20)
BUN/CREAT SERPL: 31 (ref 12–20)
BUN/CREAT SERPL: 33 (ref 12–20)
BUN/CREAT SERPL: 35 (ref 12–20)
CALCIUM SERPL-MCNC: 7.4 MG/DL (ref 8.5–10.1)
CALCIUM SERPL-MCNC: 7.5 MG/DL (ref 8.5–10.1)
CALCIUM SERPL-MCNC: 7.7 MG/DL (ref 8.5–10.1)
CALCIUM SERPL-MCNC: 8 MG/DL (ref 8.5–10.1)
CALCIUM SERPL-MCNC: 8.1 MG/DL (ref 8.5–10.1)
CALCIUM SERPL-MCNC: 8.6 MG/DL (ref 8.5–10.1)
CALCULATED R AXIS, ECG10: -14 DEGREES
CALCULATED R AXIS, ECG10: -2 DEGREES
CALCULATED R AXIS, ECG10: -34 DEGREES
CALCULATED R AXIS, ECG10: 1 DEGREES
CALCULATED R AXIS, ECG10: 8 DEGREES
CALCULATED T AXIS, ECG11: 105 DEGREES
CALCULATED T AXIS, ECG11: 119 DEGREES
CALCULATED T AXIS, ECG11: 23 DEGREES
CALCULATED T AXIS, ECG11: 68 DEGREES
CALCULATED T AXIS, ECG11: 91 DEGREES
CC UR VC: ABNORMAL
CHLORIDE SERPL-SCNC: 106 MMOL/L (ref 97–108)
CHLORIDE SERPL-SCNC: 109 MMOL/L (ref 97–108)
CHLORIDE SERPL-SCNC: 111 MMOL/L (ref 97–108)
CHLORIDE SERPL-SCNC: 111 MMOL/L (ref 97–108)
CHLORIDE SERPL-SCNC: 112 MMOL/L (ref 97–108)
CHLORIDE SERPL-SCNC: 114 MMOL/L (ref 97–108)
CHLORIDE SERPL-SCNC: 116 MMOL/L (ref 97–108)
CHLORIDE SERPL-SCNC: 118 MMOL/L (ref 97–108)
CK MB CFR SERPL CALC: 2.8 % (ref 0–2.5)
CK MB CFR SERPL CALC: 3.3 % (ref 0–2.5)
CK MB CFR SERPL CALC: 3.8 % (ref 0–2.5)
CK MB SERPL-MCNC: 1.3 NG/ML (ref 5–25)
CK MB SERPL-MCNC: 1.9 NG/ML (ref 5–25)
CK MB SERPL-MCNC: 3.1 NG/ML (ref 5–25)
CK SERPL-CCNC: 47 U/L (ref 26–192)
CK SERPL-CCNC: 50 U/L (ref 26–192)
CK SERPL-CCNC: 95 U/L (ref 26–192)
CO2 SERPL-SCNC: 19 MMOL/L (ref 21–32)
CO2 SERPL-SCNC: 19 MMOL/L (ref 21–32)
CO2 SERPL-SCNC: 20 MMOL/L (ref 21–32)
CO2 SERPL-SCNC: 21 MMOL/L (ref 21–32)
CO2 SERPL-SCNC: 24 MMOL/L (ref 21–32)
CO2 SERPL-SCNC: 28 MMOL/L (ref 21–32)
COLOR UR: ABNORMAL
COMMENT, HOLDF: NORMAL
COMMENT, HOLDF: NORMAL
CREAT SERPL-MCNC: 0.3 MG/DL (ref 0.55–1.02)
CREAT SERPL-MCNC: 0.31 MG/DL (ref 0.55–1.02)
CREAT SERPL-MCNC: 0.37 MG/DL (ref 0.55–1.02)
CREAT SERPL-MCNC: 0.37 MG/DL (ref 0.55–1.02)
CREAT SERPL-MCNC: 0.4 MG/DL (ref 0.55–1.02)
CREAT SERPL-MCNC: 0.43 MG/DL (ref 0.55–1.02)
CREAT SERPL-MCNC: 0.54 MG/DL (ref 0.55–1.02)
CREAT SERPL-MCNC: 0.81 MG/DL (ref 0.55–1.02)
DIAGNOSIS, 93000: NORMAL
DIFFERENTIAL METHOD BLD: ABNORMAL
ECHO AO ROOT DIAM: 2.64 CM
ECHO AV MEAN GRADIENT: 4.7 MMHG
ECHO AV PEAK GRADIENT: 7.8 MMHG
ECHO AV PEAK VELOCITY: 139.43 CM/S
ECHO AV REGURGITANT PHT: 464.6 CM
ECHO AV VTI: 34.05 CM
ECHO EST RA PRESSURE: 10 MMHG
ECHO LA AREA 2C: 16.94 CM2
ECHO LA AREA 4C: 18.1 CM2
ECHO LA MAJOR AXIS: 3.58 CM
ECHO LA TO AORTIC ROOT RATIO: 1.36
ECHO LA VOL 2C: 45.16 ML (ref 22–52)
ECHO LA VOL 4C: 49.87 ML (ref 22–52)
ECHO LA VOL BP: 53.4 ML (ref 22–52)
ECHO LA VOL/BSA BIPLANE: 35.84 ML/M2 (ref 16–28)
ECHO LA VOLUME INDEX A2C: 30.31 ML/M2 (ref 16–28)
ECHO LA VOLUME INDEX A4C: 33.47 ML/M2 (ref 16–28)
ECHO LV E' LATERAL VELOCITY: 6.21 CM/S
ECHO LV E' SEPTAL VELOCITY: 3.75 CM/S
ECHO LV INTERNAL DIMENSION DIASTOLIC: 5.14 CM (ref 3.9–5.3)
ECHO LV INTERNAL DIMENSION SYSTOLIC: 4.28 CM
ECHO LV IVSD: 1.1 CM (ref 0.6–0.9)
ECHO LV MASS 2D: 373.4 G (ref 67–162)
ECHO LV MASS INDEX 2D: 250.6 G/M2 (ref 43–95)
ECHO LV POSTERIOR WALL DIASTOLIC: 1.1 CM (ref 0.6–0.9)
ECHO LVOT PEAK GRADIENT: 2.8 MMHG
ECHO LVOT PEAK VELOCITY: 84.3 CM/S
ECHO LVOT VTI: 19.8 CM
ECHO MV A VELOCITY: 67.95 CM/S
ECHO MV E DECELERATION TIME (DT): 204.4 MS
ECHO MV E VELOCITY: 65.45 CM/S
ECHO MV E/A RATIO: 0.96
ECHO MV E/E' LATERAL: 10.54
ECHO MV E/E' RATIO (AVERAGED): 14
ECHO MV E/E' SEPTAL: 17.45
ECHO MV REGURGITANT PEAK GRADIENT: 95.4 MMHG
ECHO MV REGURGITANT PEAK VELOCITY: 488.36 CM/S
ECHO PULMONARY ARTERY SYSTOLIC PRESSURE (PASP): 48.7 MMHG
ECHO PV MAX VELOCITY: 62.96 CM/S
ECHO PV PEAK GRADIENT: 1.6 MMHG
ECHO RA AREA 4C: 13.4 CM2
ECHO RIGHT VENTRICULAR SYSTOLIC PRESSURE (RVSP): 48.7 MMHG
ECHO RV INTERNAL DIMENSION: 2.12 CM
ECHO RV TAPSE: 2.46 CM (ref 1.5–2)
ECHO TV REGURGITANT MAX VELOCITY: 310.87 CM/S
ECHO TV REGURGITANT PEAK GRADIENT: 38.7 MMHG
EOSINOPHIL # BLD: 0 K/UL (ref 0–0.4)
EOSINOPHIL # BLD: 0 K/UL (ref 0–0.4)
EOSINOPHIL # BLD: 0.1 K/UL (ref 0–0.4)
EOSINOPHIL NFR BLD: 0 % (ref 0–7)
EOSINOPHIL NFR BLD: 0 % (ref 0–7)
EOSINOPHIL NFR BLD: 2 % (ref 0–7)
EPITH CASTS URNS QL MICRO: ABNORMAL /LPF
ERYTHROCYTE [DISTWIDTH] IN BLOOD BY AUTOMATED COUNT: 13.9 % (ref 11.5–14.5)
ERYTHROCYTE [DISTWIDTH] IN BLOOD BY AUTOMATED COUNT: 14 % (ref 11.5–14.5)
ERYTHROCYTE [DISTWIDTH] IN BLOOD BY AUTOMATED COUNT: 14.2 % (ref 11.5–14.5)
ERYTHROCYTE [DISTWIDTH] IN BLOOD BY AUTOMATED COUNT: 14.3 % (ref 11.5–14.5)
ERYTHROCYTE [DISTWIDTH] IN BLOOD BY AUTOMATED COUNT: 14.6 % (ref 11.5–14.5)
ERYTHROCYTE [DISTWIDTH] IN BLOOD BY AUTOMATED COUNT: 14.6 % (ref 11.5–14.5)
FLUAV AG NPH QL IA: NEGATIVE
FLUBV AG NOSE QL IA: NEGATIVE
GAS FLOW.O2 O2 DELIVERY SYS: ABNORMAL L/MIN
GAS FLOW.O2 O2 DELIVERY SYS: ABNORMAL L/MIN
GAS FLOW.O2 SETTING OXYMISER: 16 BPM
GAS FLOW.O2 SETTING OXYMISER: 16 BPM
GLOBULIN SER CALC-MCNC: 3.5 G/DL (ref 2–4)
GLOBULIN SER CALC-MCNC: 3.6 G/DL (ref 2–4)
GLOBULIN SER CALC-MCNC: 3.7 G/DL (ref 2–4)
GLOBULIN SER CALC-MCNC: 3.9 G/DL (ref 2–4)
GLUCOSE BLD STRIP.AUTO-MCNC: 103 MG/DL (ref 65–100)
GLUCOSE BLD STRIP.AUTO-MCNC: 116 MG/DL (ref 65–100)
GLUCOSE BLD STRIP.AUTO-MCNC: 128 MG/DL (ref 65–100)
GLUCOSE BLD STRIP.AUTO-MCNC: 203 MG/DL (ref 65–100)
GLUCOSE BLD STRIP.AUTO-MCNC: 75 MG/DL (ref 65–100)
GLUCOSE BLD STRIP.AUTO-MCNC: 78 MG/DL (ref 65–100)
GLUCOSE BLD STRIP.AUTO-MCNC: 81 MG/DL (ref 65–100)
GLUCOSE BLD STRIP.AUTO-MCNC: 83 MG/DL (ref 65–100)
GLUCOSE BLD STRIP.AUTO-MCNC: 84 MG/DL (ref 65–100)
GLUCOSE BLD STRIP.AUTO-MCNC: 86 MG/DL (ref 65–100)
GLUCOSE SERPL-MCNC: 102 MG/DL (ref 65–100)
GLUCOSE SERPL-MCNC: 107 MG/DL (ref 65–100)
GLUCOSE SERPL-MCNC: 134 MG/DL (ref 65–100)
GLUCOSE SERPL-MCNC: 82 MG/DL (ref 65–100)
GLUCOSE SERPL-MCNC: 82 MG/DL (ref 65–100)
GLUCOSE SERPL-MCNC: 86 MG/DL (ref 65–100)
GLUCOSE SERPL-MCNC: 89 MG/DL (ref 65–100)
GLUCOSE SERPL-MCNC: 97 MG/DL (ref 65–100)
GLUCOSE UR STRIP.AUTO-MCNC: NEGATIVE MG/DL
GRAM STN SPEC: NORMAL
HCO3 BLD-SCNC: 19.9 MMOL/L (ref 22–26)
HCO3 BLD-SCNC: 20.3 MMOL/L (ref 22–26)
HCT VFR BLD AUTO: 26.8 % (ref 35–47)
HCT VFR BLD AUTO: 27.1 % (ref 35–47)
HCT VFR BLD AUTO: 29 % (ref 35–47)
HCT VFR BLD AUTO: 30.4 % (ref 35–47)
HCT VFR BLD AUTO: 33.3 % (ref 35–47)
HCT VFR BLD AUTO: 36.7 % (ref 35–47)
HGB BLD-MCNC: 10.3 G/DL (ref 11.5–16)
HGB BLD-MCNC: 11.1 G/DL (ref 11.5–16)
HGB BLD-MCNC: 12.6 G/DL (ref 11.5–16)
HGB BLD-MCNC: 8.9 G/DL (ref 11.5–16)
HGB BLD-MCNC: 8.9 G/DL (ref 11.5–16)
HGB BLD-MCNC: 9.2 G/DL (ref 11.5–16)
HGB UR QL STRIP: ABNORMAL
HYALINE CASTS URNS QL MICRO: ABNORMAL /LPF (ref 0–5)
IMM GRANULOCYTES # BLD AUTO: 0.1 K/UL (ref 0–0.04)
IMM GRANULOCYTES # BLD AUTO: 0.1 K/UL (ref 0–0.04)
IMM GRANULOCYTES # BLD AUTO: 0.2 K/UL (ref 0–0.04)
IMM GRANULOCYTES NFR BLD AUTO: 1 % (ref 0–0.5)
KETONES UR QL STRIP.AUTO: ABNORMAL MG/DL
LACTATE BLD-SCNC: 1.91 MMOL/L (ref 0.4–2)
LEUKOCYTE ESTERASE UR QL STRIP.AUTO: ABNORMAL
LIPASE SERPL-CCNC: 25 U/L (ref 73–393)
LYMPHOCYTES # BLD: 0.6 K/UL (ref 0.8–3.5)
LYMPHOCYTES # BLD: 0.8 K/UL (ref 0.8–3.5)
LYMPHOCYTES # BLD: 1 K/UL (ref 0.8–3.5)
LYMPHOCYTES NFR BLD: 6 % (ref 12–49)
LYMPHOCYTES NFR BLD: 7 % (ref 12–49)
LYMPHOCYTES NFR BLD: 9 % (ref 12–49)
MAGNESIUM SERPL-MCNC: 1.7 MG/DL (ref 1.6–2.4)
MAGNESIUM SERPL-MCNC: 1.8 MG/DL (ref 1.6–2.4)
MAGNESIUM SERPL-MCNC: 1.8 MG/DL (ref 1.6–2.4)
MAGNESIUM SERPL-MCNC: 2 MG/DL (ref 1.6–2.4)
MAGNESIUM SERPL-MCNC: 2.7 MG/DL (ref 1.6–2.4)
MCH RBC QN AUTO: 28.6 PG (ref 26–34)
MCH RBC QN AUTO: 29.1 PG (ref 26–34)
MCH RBC QN AUTO: 29.5 PG (ref 26–34)
MCH RBC QN AUTO: 29.7 PG (ref 26–34)
MCH RBC QN AUTO: 29.8 PG (ref 26–34)
MCH RBC QN AUTO: 29.9 PG (ref 26–34)
MCHC RBC AUTO-ENTMCNC: 31.7 G/DL (ref 30–36.5)
MCHC RBC AUTO-ENTMCNC: 32.8 G/DL (ref 30–36.5)
MCHC RBC AUTO-ENTMCNC: 33.2 G/DL (ref 30–36.5)
MCHC RBC AUTO-ENTMCNC: 33.3 G/DL (ref 30–36.5)
MCHC RBC AUTO-ENTMCNC: 33.9 G/DL (ref 30–36.5)
MCHC RBC AUTO-ENTMCNC: 34.3 G/DL (ref 30–36.5)
MCV RBC AUTO: 87.1 FL (ref 80–99)
MCV RBC AUTO: 87.2 FL (ref 80–99)
MCV RBC AUTO: 88.6 FL (ref 80–99)
MCV RBC AUTO: 89.3 FL (ref 80–99)
MCV RBC AUTO: 89.3 FL (ref 80–99)
MCV RBC AUTO: 90.1 FL (ref 80–99)
MONOCYTES # BLD: 0.4 K/UL (ref 0–1)
MONOCYTES # BLD: 0.4 K/UL (ref 0–1)
MONOCYTES # BLD: 0.6 K/UL (ref 0–1)
MONOCYTES NFR BLD: 3 % (ref 5–13)
MONOCYTES NFR BLD: 3 % (ref 5–13)
MONOCYTES NFR BLD: 8 % (ref 5–13)
NEUTS SEG # BLD: 11.4 K/UL (ref 1.8–8)
NEUTS SEG # BLD: 13.3 K/UL (ref 1.8–8)
NEUTS SEG # BLD: 5.7 K/UL (ref 1.8–8)
NEUTS SEG NFR BLD: 80 % (ref 32–75)
NEUTS SEG NFR BLD: 89 % (ref 32–75)
NEUTS SEG NFR BLD: 90 % (ref 32–75)
NITRITE UR QL STRIP.AUTO: NEGATIVE
NRBC # BLD: 0 K/UL (ref 0–0.01)
NRBC BLD-RTO: 0 PER 100 WBC
O2/TOTAL GAS SETTING VFR VENT: 100 %
O2/TOTAL GAS SETTING VFR VENT: 40 %
P-R INTERVAL, ECG05: 166 MS
PCO2 BLD: 25.4 MMHG (ref 35–45)
PCO2 BLD: 30.8 MMHG (ref 35–45)
PEEP RESPIRATORY: 6 CMH2O
PEEP RESPIRATORY: 6 CMH2O
PH BLD: 7.42 [PH] (ref 7.35–7.45)
PH BLD: 7.51 [PH] (ref 7.35–7.45)
PH UR STRIP: 5.5 [PH] (ref 5–8)
PHOSPHATE SERPL-MCNC: 1.2 MG/DL (ref 2.6–4.7)
PHOSPHATE SERPL-MCNC: 2.6 MG/DL (ref 2.6–4.7)
PHOSPHATE SERPL-MCNC: 3.2 MG/DL (ref 2.6–4.7)
PISA AR MAX VEL: 430.39 CM/S
PLATELET # BLD AUTO: 153 K/UL (ref 150–400)
PLATELET # BLD AUTO: 159 K/UL (ref 150–400)
PLATELET # BLD AUTO: 159 K/UL (ref 150–400)
PLATELET # BLD AUTO: 165 K/UL (ref 150–400)
PLATELET # BLD AUTO: 185 K/UL (ref 150–400)
PLATELET # BLD AUTO: 243 K/UL (ref 150–400)
PMV BLD AUTO: 10.6 FL (ref 8.9–12.9)
PMV BLD AUTO: 11 FL (ref 8.9–12.9)
PMV BLD AUTO: 11.1 FL (ref 8.9–12.9)
PMV BLD AUTO: 11.3 FL (ref 8.9–12.9)
PMV BLD AUTO: 11.3 FL (ref 8.9–12.9)
PMV BLD AUTO: 12 FL (ref 8.9–12.9)
PO2 BLD: 175 MMHG (ref 80–100)
PO2 BLD: 418 MMHG (ref 80–100)
POTASSIUM SERPL-SCNC: 2.7 MMOL/L (ref 3.5–5.1)
POTASSIUM SERPL-SCNC: 3 MMOL/L (ref 3.5–5.1)
POTASSIUM SERPL-SCNC: 3 MMOL/L (ref 3.5–5.1)
POTASSIUM SERPL-SCNC: 3.2 MMOL/L (ref 3.5–5.1)
POTASSIUM SERPL-SCNC: 3.5 MMOL/L (ref 3.5–5.1)
POTASSIUM SERPL-SCNC: 3.5 MMOL/L (ref 3.5–5.1)
POTASSIUM SERPL-SCNC: 3.6 MMOL/L (ref 3.5–5.1)
POTASSIUM SERPL-SCNC: 3.7 MMOL/L (ref 3.5–5.1)
PROT SERPL-MCNC: 5.2 G/DL (ref 6.4–8.2)
PROT SERPL-MCNC: 5.2 G/DL (ref 6.4–8.2)
PROT SERPL-MCNC: 5.4 G/DL (ref 6.4–8.2)
PROT SERPL-MCNC: 6.4 G/DL (ref 6.4–8.2)
PROT UR STRIP-MCNC: 100 MG/DL
Q-T INTERVAL, ECG07: 336 MS
Q-T INTERVAL, ECG07: 352 MS
Q-T INTERVAL, ECG07: 358 MS
Q-T INTERVAL, ECG07: 368 MS
Q-T INTERVAL, ECG07: 374 MS
QRS DURATION, ECG06: 118 MS
QRS DURATION, ECG06: 122 MS
QRS DURATION, ECG06: 126 MS
QRS DURATION, ECG06: 130 MS
QRS DURATION, ECG06: 130 MS
QTC CALCULATION (BEZET), ECG08: 469 MS
QTC CALCULATION (BEZET), ECG08: 497 MS
QTC CALCULATION (BEZET), ECG08: 505 MS
QTC CALCULATION (BEZET), ECG08: 528 MS
QTC CALCULATION (BEZET), ECG08: 542 MS
RBC # BLD AUTO: 3 M/UL (ref 3.8–5.2)
RBC # BLD AUTO: 3.06 M/UL (ref 3.8–5.2)
RBC # BLD AUTO: 3.22 M/UL (ref 3.8–5.2)
RBC # BLD AUTO: 3.49 M/UL (ref 3.8–5.2)
RBC # BLD AUTO: 3.73 M/UL (ref 3.8–5.2)
RBC # BLD AUTO: 4.21 M/UL (ref 3.8–5.2)
RBC #/AREA URNS HPF: ABNORMAL /HPF (ref 0–5)
SAMPLES BEING HELD,HOLD: NORMAL
SAMPLES BEING HELD,HOLD: NORMAL
SAO2 % BLD: 100 % (ref 92–97)
SAO2 % BLD: 100 % (ref 92–97)
SERVICE CMNT-IMP: ABNORMAL
SERVICE CMNT-IMP: NORMAL
SODIUM SERPL-SCNC: 140 MMOL/L (ref 136–145)
SODIUM SERPL-SCNC: 140 MMOL/L (ref 136–145)
SODIUM SERPL-SCNC: 141 MMOL/L (ref 136–145)
SODIUM SERPL-SCNC: 142 MMOL/L (ref 136–145)
SODIUM SERPL-SCNC: 143 MMOL/L (ref 136–145)
SODIUM SERPL-SCNC: 143 MMOL/L (ref 136–145)
SODIUM SERPL-SCNC: 146 MMOL/L (ref 136–145)
SODIUM SERPL-SCNC: 147 MMOL/L (ref 136–145)
SP GR UR REFRACTOMETRY: 1.03 (ref 1–1.03)
SPECIMEN TYPE: ABNORMAL
SPECIMEN TYPE: ABNORMAL
TOTAL RESP. RATE, ITRR: 17
TOTAL RESP. RATE, ITRR: 21
TROPONIN I SERPL-MCNC: 0.18 NG/ML
TROPONIN I SERPL-MCNC: 0.29 NG/ML
TROPONIN I SERPL-MCNC: <0.05 NG/ML
UA: UC IF INDICATED,UAUC: ABNORMAL
UROBILINOGEN UR QL STRIP.AUTO: 1 EU/DL (ref 0.2–1)
VENTILATION MODE VENT: ABNORMAL
VENTILATION MODE VENT: ABNORMAL
VENTRICULAR RATE, ECG03: 120 BPM
VENTRICULAR RATE, ECG03: 124 BPM
VENTRICULAR RATE, ECG03: 132 BPM
VENTRICULAR RATE, ECG03: 138 BPM
VENTRICULAR RATE, ECG03: 98 BPM
VT SETTING VENT: 400 ML
VT SETTING VENT: 400 ML
WBC # BLD AUTO: 12.7 K/UL (ref 3.6–11)
WBC # BLD AUTO: 14.1 K/UL (ref 3.6–11)
WBC # BLD AUTO: 15 K/UL (ref 3.6–11)
WBC # BLD AUTO: 7.2 K/UL (ref 3.6–11)
WBC # BLD AUTO: 7.7 K/UL (ref 3.6–11)
WBC # BLD AUTO: 7.9 K/UL (ref 3.6–11)
WBC URNS QL MICRO: ABNORMAL /HPF (ref 0–4)

## 2019-01-01 PROCEDURE — 74011250637 HC RX REV CODE- 250/637: Performed by: NURSE PRACTITIONER

## 2019-01-01 PROCEDURE — 71045 X-RAY EXAM CHEST 1 VIEW: CPT

## 2019-01-01 PROCEDURE — 74011250636 HC RX REV CODE- 250/636: Performed by: NURSE PRACTITIONER

## 2019-01-01 PROCEDURE — 83690 ASSAY OF LIPASE: CPT

## 2019-01-01 PROCEDURE — 74011000258 HC RX REV CODE- 258: Performed by: INTERNAL MEDICINE

## 2019-01-01 PROCEDURE — 81001 URINALYSIS AUTO W/SCOPE: CPT

## 2019-01-01 PROCEDURE — 94003 VENT MGMT INPAT SUBQ DAY: CPT

## 2019-01-01 PROCEDURE — 65610000006 HC RM INTENSIVE CARE

## 2019-01-01 PROCEDURE — 83735 ASSAY OF MAGNESIUM: CPT

## 2019-01-01 PROCEDURE — 99285 EMERGENCY DEPT VISIT HI MDM: CPT

## 2019-01-01 PROCEDURE — 74011000258 HC RX REV CODE- 258: Performed by: NEUROMUSCULOSKELETAL MEDICINE & OMM

## 2019-01-01 PROCEDURE — 84100 ASSAY OF PHOSPHORUS: CPT

## 2019-01-01 PROCEDURE — 65270000015 HC RM PRIVATE ONCOLOGY

## 2019-01-01 PROCEDURE — 74011250636 HC RX REV CODE- 250/636: Performed by: INTERNAL MEDICINE

## 2019-01-01 PROCEDURE — 80048 BASIC METABOLIC PNL TOTAL CA: CPT

## 2019-01-01 PROCEDURE — 80053 COMPREHEN METABOLIC PANEL: CPT

## 2019-01-01 PROCEDURE — 96365 THER/PROPH/DIAG IV INF INIT: CPT

## 2019-01-01 PROCEDURE — 82962 GLUCOSE BLOOD TEST: CPT

## 2019-01-01 PROCEDURE — 74011250636 HC RX REV CODE- 250/636: Performed by: NEUROMUSCULOSKELETAL MEDICINE & OMM

## 2019-01-01 PROCEDURE — A4248 CHLORHEXIDINE ANTISEPT: HCPCS | Performed by: NEUROMUSCULOSKELETAL MEDICINE & OMM

## 2019-01-01 PROCEDURE — 74011250637 HC RX REV CODE- 250/637: Performed by: HOSPITALIST

## 2019-01-01 PROCEDURE — 84484 ASSAY OF TROPONIN QUANT: CPT

## 2019-01-01 PROCEDURE — 94760 N-INVAS EAR/PLS OXIMETRY 1: CPT

## 2019-01-01 PROCEDURE — 36415 COLL VENOUS BLD VENIPUNCTURE: CPT

## 2019-01-01 PROCEDURE — 74011636320 HC RX REV CODE- 636/320: Performed by: EMERGENCY MEDICINE

## 2019-01-01 PROCEDURE — 74011000250 HC RX REV CODE- 250: Performed by: NURSE PRACTITIONER

## 2019-01-01 PROCEDURE — 5A2204Z RESTORATION OF CARDIAC RHYTHM, SINGLE: ICD-10-PCS | Performed by: EMERGENCY MEDICINE

## 2019-01-01 PROCEDURE — 0BH17EZ INSERTION OF ENDOTRACHEAL AIRWAY INTO TRACHEA, VIA NATURAL OR ARTIFICIAL OPENING: ICD-10-PCS | Performed by: EMERGENCY MEDICINE

## 2019-01-01 PROCEDURE — 96368 THER/DIAG CONCURRENT INF: CPT

## 2019-01-01 PROCEDURE — 77030020291 HC FLEXSEAL FMS BMS -C

## 2019-01-01 PROCEDURE — 99222 1ST HOSP IP/OBS MODERATE 55: CPT | Performed by: INTERNAL MEDICINE

## 2019-01-01 PROCEDURE — 74011000250 HC RX REV CODE- 250: Performed by: INTERNAL MEDICINE

## 2019-01-01 PROCEDURE — 85027 COMPLETE CBC AUTOMATED: CPT

## 2019-01-01 PROCEDURE — 77010033678 HC OXYGEN DAILY

## 2019-01-01 PROCEDURE — 74011000250 HC RX REV CODE- 250: Performed by: NEUROMUSCULOSKELETAL MEDICINE & OMM

## 2019-01-01 PROCEDURE — 0656 HSPC GENERAL INPATIENT

## 2019-01-01 PROCEDURE — 77030038269 HC DRN EXT URIN PURWCK BARD -A

## 2019-01-01 PROCEDURE — 36592 COLLECT BLOOD FROM PICC: CPT

## 2019-01-01 PROCEDURE — 85025 COMPLETE CBC W/AUTO DIFF WBC: CPT

## 2019-01-01 PROCEDURE — 87070 CULTURE OTHR SPECIMN AEROBIC: CPT

## 2019-01-01 PROCEDURE — 93005 ELECTROCARDIOGRAM TRACING: CPT

## 2019-01-01 PROCEDURE — 74011250637 HC RX REV CODE- 250/637: Performed by: INTERNAL MEDICINE

## 2019-01-01 PROCEDURE — 77030032490 HC SLV COMPR SCD KNE COVD -B

## 2019-01-01 PROCEDURE — 36600 WITHDRAWAL OF ARTERIAL BLOOD: CPT

## 2019-01-01 PROCEDURE — 74011000258 HC RX REV CODE- 258: Performed by: EMERGENCY MEDICINE

## 2019-01-01 PROCEDURE — 74011250637 HC RX REV CODE- 250/637: Performed by: EMERGENCY MEDICINE

## 2019-01-01 PROCEDURE — 87086 URINE CULTURE/COLONY COUNT: CPT

## 2019-01-01 PROCEDURE — G0299 HHS/HOSPICE OF RN EA 15 MIN: HCPCS

## 2019-01-01 PROCEDURE — 74177 CT ABD & PELVIS W/CONTRAST: CPT

## 2019-01-01 PROCEDURE — 74011250637 HC RX REV CODE- 250/637: Performed by: NEUROMUSCULOSKELETAL MEDICINE & OMM

## 2019-01-01 PROCEDURE — 82803 BLOOD GASES ANY COMBINATION: CPT

## 2019-01-01 PROCEDURE — 76450000000

## 2019-01-01 PROCEDURE — 74011250636 HC RX REV CODE- 250/636: Performed by: EMERGENCY MEDICINE

## 2019-01-01 PROCEDURE — 87804 INFLUENZA ASSAY W/OPTIC: CPT

## 2019-01-01 PROCEDURE — 76937 US GUIDE VASCULAR ACCESS: CPT

## 2019-01-01 PROCEDURE — 3336500001 HSPC ELECTION

## 2019-01-01 PROCEDURE — 87077 CULTURE AEROBIC IDENTIFY: CPT

## 2019-01-01 PROCEDURE — 74011000250 HC RX REV CODE- 250: Performed by: HOSPITALIST

## 2019-01-01 PROCEDURE — 74018 RADEX ABDOMEN 1 VIEW: CPT

## 2019-01-01 PROCEDURE — 87040 BLOOD CULTURE FOR BACTERIA: CPT

## 2019-01-01 PROCEDURE — 74011250636 HC RX REV CODE- 250/636: Performed by: HOSPITALIST

## 2019-01-01 PROCEDURE — 65660000000 HC RM CCU STEPDOWN

## 2019-01-01 PROCEDURE — 94002 VENT MGMT INPAT INIT DAY: CPT

## 2019-01-01 PROCEDURE — 36569 INSJ PICC 5 YR+ W/O IMAGING: CPT | Performed by: NEUROMUSCULOSKELETAL MEDICINE & OMM

## 2019-01-01 PROCEDURE — 77030037878 HC DRSG MEPILEX >48IN BORD MOLN -B

## 2019-01-01 PROCEDURE — 77030008771 HC TU NG SALEM SUMP -A

## 2019-01-01 PROCEDURE — C1751 CATH, INF, PER/CENT/MIDLINE: HCPCS

## 2019-01-01 PROCEDURE — 77030011256 HC DRSG MEPILEX <16IN NO BORD MOLN -A

## 2019-01-01 PROCEDURE — 87186 SC STD MICRODIL/AGAR DIL: CPT

## 2019-01-01 PROCEDURE — 83605 ASSAY OF LACTIC ACID: CPT

## 2019-01-01 PROCEDURE — 92950 HEART/LUNG RESUSCITATION CPR: CPT

## 2019-01-01 PROCEDURE — 93306 TTE W/DOPPLER COMPLETE: CPT

## 2019-01-01 PROCEDURE — 5A1945Z RESPIRATORY VENTILATION, 24-96 CONSECUTIVE HOURS: ICD-10-PCS | Performed by: EMERGENCY MEDICINE

## 2019-01-01 PROCEDURE — 82550 ASSAY OF CK (CPK): CPT

## 2019-01-01 PROCEDURE — 92610 EVALUATE SWALLOWING FUNCTION: CPT

## 2019-01-01 PROCEDURE — 96361 HYDRATE IV INFUSION ADD-ON: CPT

## 2019-01-01 RX ORDER — GLYCOPYRROLATE 0.2 MG/ML
0.2 INJECTION INTRAMUSCULAR; INTRAVENOUS EVERY 4 HOURS
Status: DISCONTINUED | OUTPATIENT
Start: 2019-01-01 | End: 2019-01-01 | Stop reason: HOSPADM

## 2019-01-01 RX ORDER — SODIUM,POTASSIUM PHOSPHATES 280-250MG
2 POWDER IN PACKET (EA) ORAL 4 TIMES DAILY
Status: DISCONTINUED | OUTPATIENT
Start: 2019-01-01 | End: 2019-01-01

## 2019-01-01 RX ORDER — SODIUM CHLORIDE 0.9 % (FLUSH) 0.9 %
10 SYRINGE (ML) INJECTION
Status: COMPLETED | OUTPATIENT
Start: 2019-01-01 | End: 2019-01-01

## 2019-01-01 RX ORDER — METOPROLOL TARTRATE 5 MG/5ML
2.5 INJECTION INTRAVENOUS EVERY 6 HOURS
Status: DISCONTINUED | OUTPATIENT
Start: 2019-01-01 | End: 2019-01-01

## 2019-01-01 RX ORDER — POTASSIUM CHLORIDE 7.45 MG/ML
10 INJECTION INTRAVENOUS
Status: COMPLETED | OUTPATIENT
Start: 2019-01-01 | End: 2019-01-01

## 2019-01-01 RX ORDER — HEPARIN SODIUM 5000 [USP'U]/ML
5000 INJECTION, SOLUTION INTRAVENOUS; SUBCUTANEOUS EVERY 12 HOURS
Status: DISCONTINUED | OUTPATIENT
Start: 2019-01-01 | End: 2019-01-01

## 2019-01-01 RX ORDER — SCOLOPAMINE TRANSDERMAL SYSTEM 1 MG/1
1 PATCH, EXTENDED RELEASE TRANSDERMAL
Status: DISCONTINUED | OUTPATIENT
Start: 2019-01-01 | End: 2019-01-01 | Stop reason: HOSPADM

## 2019-01-01 RX ORDER — HEPARIN 100 UNIT/ML
300 SYRINGE INTRAVENOUS AS NEEDED
Status: DISCONTINUED | OUTPATIENT
Start: 2019-01-01 | End: 2019-01-01 | Stop reason: HOSPADM

## 2019-01-01 RX ORDER — DILTIAZEM HYDROCHLORIDE 5 MG/ML
10 INJECTION INTRAVENOUS ONCE
Status: COMPLETED | OUTPATIENT
Start: 2019-01-01 | End: 2019-01-01

## 2019-01-01 RX ORDER — LEVOFLOXACIN 5 MG/ML
500 INJECTION, SOLUTION INTRAVENOUS EVERY 24 HOURS
Status: DISCONTINUED | OUTPATIENT
Start: 2019-01-01 | End: 2019-01-01

## 2019-01-01 RX ORDER — SODIUM CHLORIDE 0.9 % (FLUSH) 0.9 %
5-10 SYRINGE (ML) INJECTION AS NEEDED
Status: DISCONTINUED | OUTPATIENT
Start: 2019-01-01 | End: 2019-01-01 | Stop reason: HOSPADM

## 2019-01-01 RX ORDER — SUCCINYLCHOLINE CHLORIDE 20 MG/ML
100 INJECTION INTRAMUSCULAR; INTRAVENOUS
Status: COMPLETED | OUTPATIENT
Start: 2019-01-01 | End: 2019-01-01

## 2019-01-01 RX ORDER — MORPHINE SULFATE 100 MG/5ML
5 SOLUTION ORAL
Status: DISCONTINUED | OUTPATIENT
Start: 2019-01-01 | End: 2019-01-01 | Stop reason: HOSPADM

## 2019-01-01 RX ORDER — HYDRALAZINE HYDROCHLORIDE 20 MG/ML
10 INJECTION INTRAMUSCULAR; INTRAVENOUS
Status: DISCONTINUED | OUTPATIENT
Start: 2019-01-01 | End: 2019-01-01

## 2019-01-01 RX ORDER — SODIUM CHLORIDE AND POTASSIUM CHLORIDE .9; .15 G/100ML; G/100ML
SOLUTION INTRAVENOUS CONTINUOUS
Status: DISCONTINUED | OUTPATIENT
Start: 2019-01-01 | End: 2019-01-01

## 2019-01-01 RX ORDER — SODIUM CHLORIDE 9 MG/ML
130 INJECTION, SOLUTION INTRAVENOUS CONTINUOUS
Status: DISCONTINUED | OUTPATIENT
Start: 2019-01-01 | End: 2019-01-01

## 2019-01-01 RX ORDER — LEVOFLOXACIN 5 MG/ML
750 INJECTION, SOLUTION INTRAVENOUS
Status: DISCONTINUED | OUTPATIENT
Start: 2019-01-01 | End: 2019-01-01

## 2019-01-01 RX ORDER — DEXTROSE 50 % IN WATER (D50W) INTRAVENOUS SYRINGE
12.5 ONCE
Status: COMPLETED | OUTPATIENT
Start: 2019-01-01 | End: 2019-01-01

## 2019-01-01 RX ORDER — ENOXAPARIN SODIUM 100 MG/ML
1 INJECTION SUBCUTANEOUS EVERY 24 HOURS
Status: DISCONTINUED | OUTPATIENT
Start: 2019-01-01 | End: 2019-01-01 | Stop reason: DRUGHIGH

## 2019-01-01 RX ORDER — FACIAL-BODY WIPES
10 EACH TOPICAL ONCE
Status: COMPLETED | OUTPATIENT
Start: 2019-01-01 | End: 2019-01-01

## 2019-01-01 RX ORDER — DEXTROSE 50 % IN WATER (D50W) INTRAVENOUS SYRINGE
25 AS NEEDED
Status: DISCONTINUED | OUTPATIENT
Start: 2019-01-01 | End: 2019-01-01

## 2019-01-01 RX ORDER — ENOXAPARIN SODIUM 100 MG/ML
25 INJECTION SUBCUTANEOUS DAILY
Status: DISCONTINUED | OUTPATIENT
Start: 2019-01-01 | End: 2019-01-01

## 2019-01-01 RX ORDER — DILTIAZEM HYDROCHLORIDE 30 MG/1
30 TABLET, FILM COATED ORAL
Status: DISCONTINUED | OUTPATIENT
Start: 2019-01-01 | End: 2019-01-01

## 2019-01-01 RX ORDER — LORAZEPAM 2 MG/ML
1 CONCENTRATE ORAL
Status: DISCONTINUED | OUTPATIENT
Start: 2019-01-01 | End: 2019-01-01 | Stop reason: HOSPADM

## 2019-01-01 RX ORDER — MORPHINE SULFATE 2 MG/ML
2 INJECTION, SOLUTION INTRAMUSCULAR; INTRAVENOUS EVERY 4 HOURS
Status: DISCONTINUED | OUTPATIENT
Start: 2019-01-01 | End: 2019-01-01 | Stop reason: HOSPADM

## 2019-01-01 RX ORDER — MORPHINE SULFATE 2 MG/ML
2 INJECTION, SOLUTION INTRAMUSCULAR; INTRAVENOUS
Status: DISCONTINUED | OUTPATIENT
Start: 2019-01-01 | End: 2019-01-01 | Stop reason: HOSPADM

## 2019-01-01 RX ORDER — SODIUM CHLORIDE 0.9 % (FLUSH) 0.9 %
5 SYRINGE (ML) INJECTION AS NEEDED
Status: DISCONTINUED | OUTPATIENT
Start: 2019-01-01 | End: 2019-01-01 | Stop reason: HOSPADM

## 2019-01-01 RX ORDER — METRONIDAZOLE 500 MG/100ML
500 INJECTION, SOLUTION INTRAVENOUS EVERY 12 HOURS
Status: DISCONTINUED | OUTPATIENT
Start: 2019-01-01 | End: 2019-01-01

## 2019-01-01 RX ORDER — ENOXAPARIN SODIUM 100 MG/ML
1 INJECTION SUBCUTANEOUS EVERY 12 HOURS
Status: DISCONTINUED | OUTPATIENT
Start: 2019-01-01 | End: 2019-01-01

## 2019-01-01 RX ORDER — IPRATROPIUM BROMIDE 0.5 MG/2.5ML
0.5 SOLUTION RESPIRATORY (INHALATION)
Status: DISCONTINUED | OUTPATIENT
Start: 2019-01-01 | End: 2019-01-01 | Stop reason: HOSPADM

## 2019-01-01 RX ORDER — ACETAMINOPHEN 650 MG/1
650 SUPPOSITORY RECTAL
Status: DISCONTINUED | OUTPATIENT
Start: 2019-01-01 | End: 2019-01-01 | Stop reason: HOSPADM

## 2019-01-01 RX ORDER — ACETAMINOPHEN 325 MG/1
650 TABLET ORAL
Status: DISCONTINUED | OUTPATIENT
Start: 2019-01-01 | End: 2019-01-01

## 2019-01-01 RX ORDER — GLYCOPYRROLATE 0.2 MG/ML
0.2 INJECTION INTRAMUSCULAR; INTRAVENOUS
Status: DISCONTINUED | OUTPATIENT
Start: 2019-01-01 | End: 2019-01-01

## 2019-01-01 RX ORDER — NOREPINEPHRINE BITARTRATE/D5W 8 MG/250ML
2-30 PLASTIC BAG, INJECTION (ML) INTRAVENOUS
Status: DISCONTINUED | OUTPATIENT
Start: 2019-01-01 | End: 2019-01-01

## 2019-01-01 RX ORDER — DEXTROSE 50 % IN WATER (D50W) INTRAVENOUS SYRINGE
Status: DISPENSED
Start: 2019-01-01 | End: 2019-01-01

## 2019-01-01 RX ORDER — ENOXAPARIN SODIUM 100 MG/ML
40 INJECTION SUBCUTANEOUS DAILY
Status: DISCONTINUED | OUTPATIENT
Start: 2019-01-01 | End: 2019-01-01

## 2019-01-01 RX ORDER — METOPROLOL TARTRATE 25 MG/1
12.5 TABLET, FILM COATED ORAL 2 TIMES DAILY
Status: DISCONTINUED | OUTPATIENT
Start: 2019-01-01 | End: 2019-01-01

## 2019-01-01 RX ORDER — PROPOFOL 10 MG/ML
0-50 INJECTION, EMULSION INTRAVENOUS
Status: DISCONTINUED | OUTPATIENT
Start: 2019-01-01 | End: 2019-01-01

## 2019-01-01 RX ORDER — LORAZEPAM 2 MG/ML
1 INJECTION INTRAMUSCULAR EVERY 4 HOURS
Status: DISCONTINUED | OUTPATIENT
Start: 2019-01-01 | End: 2019-01-01 | Stop reason: HOSPADM

## 2019-01-01 RX ORDER — LORAZEPAM 2 MG/ML
1 INJECTION INTRAMUSCULAR
Status: DISCONTINUED | OUTPATIENT
Start: 2019-01-01 | End: 2019-01-01 | Stop reason: HOSPADM

## 2019-01-01 RX ORDER — VANCOMYCIN 2 GRAM/500 ML IN 0.9 % SODIUM CHLORIDE INTRAVENOUS
2000
Status: COMPLETED | OUTPATIENT
Start: 2019-01-01 | End: 2019-01-01

## 2019-01-01 RX ORDER — ACETAMINOPHEN 650 MG/1
650 SUPPOSITORY RECTAL
Status: COMPLETED | OUTPATIENT
Start: 2019-01-01 | End: 2019-01-01

## 2019-01-01 RX ORDER — BACITRACIN 500 UNIT/G
1 PACKET (EA) TOPICAL AS NEEDED
Status: DISCONTINUED | OUTPATIENT
Start: 2019-01-01 | End: 2019-01-01 | Stop reason: HOSPADM

## 2019-01-01 RX ORDER — PROPOFOL 10 MG/ML
INJECTION, EMULSION INTRAVENOUS
Status: DISPENSED
Start: 2019-01-01 | End: 2019-01-01

## 2019-01-01 RX ORDER — CHLORHEXIDINE GLUCONATE 1.2 MG/ML
15 RINSE ORAL EVERY 12 HOURS
Status: DISCONTINUED | OUTPATIENT
Start: 2019-01-01 | End: 2019-01-01

## 2019-01-01 RX ORDER — SODIUM CHLORIDE 9 MG/ML
30 INJECTION, SOLUTION INTRAVENOUS ONCE
Status: COMPLETED | OUTPATIENT
Start: 2019-01-01 | End: 2019-01-01

## 2019-01-01 RX ORDER — PROPOFOL 10 MG/ML
100 INJECTION, EMULSION INTRAVENOUS
Status: COMPLETED | OUTPATIENT
Start: 2019-01-01 | End: 2019-01-01

## 2019-01-01 RX ORDER — LEVALBUTEROL INHALATION SOLUTION 0.63 MG/3ML
0.63 SOLUTION RESPIRATORY (INHALATION)
Status: DISCONTINUED | OUTPATIENT
Start: 2019-01-01 | End: 2019-01-01 | Stop reason: HOSPADM

## 2019-01-01 RX ORDER — FACIAL-BODY WIPES
10 EACH TOPICAL DAILY PRN
Status: DISCONTINUED | OUTPATIENT
Start: 2019-01-01 | End: 2019-01-01 | Stop reason: HOSPADM

## 2019-01-01 RX ORDER — MUPIROCIN 20 MG/G
OINTMENT TOPICAL EVERY 12 HOURS
Status: DISCONTINUED | OUTPATIENT
Start: 2019-01-01 | End: 2019-01-01

## 2019-01-01 RX ORDER — METOPROLOL TARTRATE 5 MG/5ML
5 INJECTION INTRAVENOUS ONCE
Status: COMPLETED | OUTPATIENT
Start: 2019-01-01 | End: 2019-01-01

## 2019-01-01 RX ORDER — AMIODARONE HYDROCHLORIDE 150 MG/3ML
INJECTION, SOLUTION INTRAVENOUS
Status: COMPLETED | OUTPATIENT
Start: 2019-01-01 | End: 2019-01-01

## 2019-01-01 RX ADMIN — ACETAMINOPHEN 650 MG: 650 SUPPOSITORY RECTAL at 09:07

## 2019-01-01 RX ADMIN — ACETAMINOPHEN 650 MG: 325 TABLET ORAL at 17:35

## 2019-01-01 RX ADMIN — GLYCOPYRROLATE 0.2 MG: 0.2 INJECTION, SOLUTION INTRAMUSCULAR; INTRAVENOUS at 20:23

## 2019-01-01 RX ADMIN — GLYCOPYRROLATE 0.2 MG: 0.2 INJECTION, SOLUTION INTRAMUSCULAR; INTRAVENOUS at 12:10

## 2019-01-01 RX ADMIN — Medication 10 ML: at 17:22

## 2019-01-01 RX ADMIN — MUPIROCIN: 20 OINTMENT TOPICAL at 20:54

## 2019-01-01 RX ADMIN — APIXABAN 2.5 MG: 2.5 TABLET, FILM COATED ORAL at 17:21

## 2019-01-01 RX ADMIN — METOPROLOL TARTRATE 12.5 MG: 25 TABLET ORAL at 18:42

## 2019-01-01 RX ADMIN — DILTIAZEM HYDROCHLORIDE 30 MG: 30 TABLET, FILM COATED ORAL at 17:35

## 2019-01-01 RX ADMIN — METRONIDAZOLE 500 MG: 500 INJECTION, SOLUTION INTRAVENOUS at 20:24

## 2019-01-01 RX ADMIN — DILTIAZEM HYDROCHLORIDE 30 MG: 30 TABLET, FILM COATED ORAL at 08:25

## 2019-01-01 RX ADMIN — CEFEPIME HYDROCHLORIDE 1 G: 1 INJECTION, POWDER, FOR SOLUTION INTRAMUSCULAR; INTRAVENOUS at 09:06

## 2019-01-01 RX ADMIN — Medication 10 ML: at 05:00

## 2019-01-01 RX ADMIN — POTASSIUM & SODIUM PHOSPHATES POWDER PACK 280-160-250 MG 2 PACKET: 280-160-250 PACK at 21:24

## 2019-01-01 RX ADMIN — METOPROLOL TARTRATE 2.5 MG: 5 INJECTION INTRAVENOUS at 23:19

## 2019-01-01 RX ADMIN — SODIUM CHLORIDE AND POTASSIUM CHLORIDE: 9; 1.49 INJECTION, SOLUTION INTRAVENOUS at 09:57

## 2019-01-01 RX ADMIN — GLYCOPYRROLATE 0.2 MG: 0.2 INJECTION, SOLUTION INTRAMUSCULAR; INTRAVENOUS at 10:23

## 2019-01-01 RX ADMIN — MORPHINE SULFATE 2 MG: 2 INJECTION, SOLUTION INTRAMUSCULAR; INTRAVENOUS at 20:20

## 2019-01-01 RX ADMIN — GLYCOPYRROLATE 0.2 MG: 0.2 INJECTION, SOLUTION INTRAMUSCULAR; INTRAVENOUS at 12:52

## 2019-01-01 RX ADMIN — POTASSIUM & SODIUM PHOSPHATES POWDER PACK 280-160-250 MG 2 PACKET: 280-160-250 PACK at 21:02

## 2019-01-01 RX ADMIN — SODIUM CHLORIDE AND POTASSIUM CHLORIDE: 9; 1.49 INJECTION, SOLUTION INTRAVENOUS at 21:03

## 2019-01-01 RX ADMIN — MORPHINE SULFATE 2 MG: 2 INJECTION, SOLUTION INTRAMUSCULAR; INTRAVENOUS at 00:02

## 2019-01-01 RX ADMIN — POTASSIUM & SODIUM PHOSPHATES POWDER PACK 280-160-250 MG 2 PACKET: 280-160-250 PACK at 12:51

## 2019-01-01 RX ADMIN — MORPHINE SULFATE 2 MG: 2 INJECTION, SOLUTION INTRAMUSCULAR; INTRAVENOUS at 16:00

## 2019-01-01 RX ADMIN — PROPOFOL 40 MCG/KG/MIN: 10 INJECTION, EMULSION INTRAVENOUS at 22:37

## 2019-01-01 RX ADMIN — MUPIROCIN: 20 OINTMENT TOPICAL at 20:24

## 2019-01-01 RX ADMIN — PROPOFOL 100 MG: 10 INJECTION, EMULSION INTRAVENOUS at 06:55

## 2019-01-01 RX ADMIN — POTASSIUM & SODIUM PHOSPHATES POWDER PACK 280-160-250 MG 2 PACKET: 280-160-250 PACK at 13:28

## 2019-01-01 RX ADMIN — METRONIDAZOLE 500 MG: 500 INJECTION, SOLUTION INTRAVENOUS at 20:32

## 2019-01-01 RX ADMIN — HYDRALAZINE HYDROCHLORIDE 10 MG: 20 INJECTION INTRAMUSCULAR; INTRAVENOUS at 12:51

## 2019-01-01 RX ADMIN — GLYCOPYRROLATE 0.2 MG: 0.2 INJECTION, SOLUTION INTRAMUSCULAR; INTRAVENOUS at 03:45

## 2019-01-01 RX ADMIN — METRONIDAZOLE 500 MG: 500 INJECTION, SOLUTION INTRAVENOUS at 09:52

## 2019-01-01 RX ADMIN — GLYCOPYRROLATE 0.2 MG: 0.2 INJECTION, SOLUTION INTRAMUSCULAR; INTRAVENOUS at 00:16

## 2019-01-01 RX ADMIN — HYDRALAZINE HYDROCHLORIDE 10 MG: 20 INJECTION INTRAMUSCULAR; INTRAVENOUS at 05:31

## 2019-01-01 RX ADMIN — LORAZEPAM 1 MG: 2 INJECTION INTRAMUSCULAR; INTRAVENOUS at 20:20

## 2019-01-01 RX ADMIN — CEFEPIME HYDROCHLORIDE 1 G: 1 INJECTION, POWDER, FOR SOLUTION INTRAMUSCULAR; INTRAVENOUS at 01:16

## 2019-01-01 RX ADMIN — SODIUM CHLORIDE 1040 ML: 900 INJECTION, SOLUTION INTRAVENOUS at 12:15

## 2019-01-01 RX ADMIN — METRONIDAZOLE 500 MG: 500 INJECTION, SOLUTION INTRAVENOUS at 08:49

## 2019-01-01 RX ADMIN — METRONIDAZOLE 500 MG: 500 INJECTION, SOLUTION INTRAVENOUS at 13:28

## 2019-01-01 RX ADMIN — Medication 10 ML: at 09:47

## 2019-01-01 RX ADMIN — DILTIAZEM HYDROCHLORIDE 30 MG: 30 TABLET, FILM COATED ORAL at 21:55

## 2019-01-01 RX ADMIN — LORAZEPAM 1 MG: 2 INJECTION INTRAMUSCULAR; INTRAVENOUS at 04:11

## 2019-01-01 RX ADMIN — PROPOFOL 45 MCG/KG/MIN: 10 INJECTION, EMULSION INTRAVENOUS at 12:51

## 2019-01-01 RX ADMIN — GLYCOPYRROLATE 0.2 MG: 0.2 INJECTION, SOLUTION INTRAMUSCULAR; INTRAVENOUS at 12:00

## 2019-01-01 RX ADMIN — METRONIDAZOLE 500 MG: 500 INJECTION, SOLUTION INTRAVENOUS at 08:25

## 2019-01-01 RX ADMIN — MUPIROCIN: 20 OINTMENT TOPICAL at 20:32

## 2019-01-01 RX ADMIN — POTASSIUM CHLORIDE 10 MEQ: 10 INJECTION, SOLUTION INTRAVENOUS at 16:17

## 2019-01-01 RX ADMIN — CHLORHEXIDINE GLUCONATE 15 ML: 1.2 RINSE ORAL at 09:54

## 2019-01-01 RX ADMIN — GLYCOPYRROLATE 0.2 MG: 0.2 INJECTION, SOLUTION INTRAMUSCULAR; INTRAVENOUS at 20:24

## 2019-01-01 RX ADMIN — POTASSIUM CHLORIDE 10 MEQ: 10 INJECTION, SOLUTION INTRAVENOUS at 12:14

## 2019-01-01 RX ADMIN — LORAZEPAM 1 MG: 2 INJECTION INTRAMUSCULAR; INTRAVENOUS at 09:15

## 2019-01-01 RX ADMIN — MORPHINE SULFATE 2 MG: 2 INJECTION, SOLUTION INTRAMUSCULAR; INTRAVENOUS at 16:27

## 2019-01-01 RX ADMIN — METOPROLOL TARTRATE 12.5 MG: 25 TABLET ORAL at 08:26

## 2019-01-01 RX ADMIN — LORAZEPAM 1 MG: 2 INJECTION INTRAMUSCULAR; INTRAVENOUS at 16:27

## 2019-01-01 RX ADMIN — Medication 10 ML: at 08:32

## 2019-01-01 RX ADMIN — POTASSIUM & SODIUM PHOSPHATES POWDER PACK 280-160-250 MG 2 PACKET: 280-160-250 PACK at 08:25

## 2019-01-01 RX ADMIN — GLYCOPYRROLATE 0.2 MG: 0.2 INJECTION, SOLUTION INTRAMUSCULAR; INTRAVENOUS at 12:37

## 2019-01-01 RX ADMIN — PROPOFOL 10 MCG/KG/MIN: 10 INJECTION, EMULSION INTRAVENOUS at 07:20

## 2019-01-01 RX ADMIN — APIXABAN 2.5 MG: 2.5 TABLET, FILM COATED ORAL at 17:35

## 2019-01-01 RX ADMIN — POTASSIUM & SODIUM PHOSPHATES POWDER PACK 280-160-250 MG 2 PACKET: 280-160-250 PACK at 17:35

## 2019-01-01 RX ADMIN — SODIUM CHLORIDE 1000 ML: 900 INJECTION, SOLUTION INTRAVENOUS at 11:05

## 2019-01-01 RX ADMIN — SODIUM CHLORIDE 130 ML/HR: 900 INJECTION, SOLUTION INTRAVENOUS at 13:27

## 2019-01-01 RX ADMIN — PROPOFOL 45 MCG/KG/MIN: 10 INJECTION, EMULSION INTRAVENOUS at 04:18

## 2019-01-01 RX ADMIN — GLYCOPYRROLATE 0.2 MG: 0.2 INJECTION, SOLUTION INTRAMUSCULAR; INTRAVENOUS at 17:21

## 2019-01-01 RX ADMIN — GLYCOPYRROLATE 0.2 MG: 0.2 INJECTION, SOLUTION INTRAMUSCULAR; INTRAVENOUS at 21:32

## 2019-01-01 RX ADMIN — MORPHINE SULFATE 5 MG: 100 SOLUTION ORAL at 12:52

## 2019-01-01 RX ADMIN — GLYCOPYRROLATE 0.2 MG: 0.2 INJECTION, SOLUTION INTRAMUSCULAR; INTRAVENOUS at 09:06

## 2019-01-01 RX ADMIN — DILTIAZEM HYDROCHLORIDE 30 MG: 30 TABLET, FILM COATED ORAL at 22:36

## 2019-01-01 RX ADMIN — POTASSIUM & SODIUM PHOSPHATES POWDER PACK 280-160-250 MG 2 PACKET: 280-160-250 PACK at 17:21

## 2019-01-01 RX ADMIN — GLYCOPYRROLATE 0.2 MG: 0.2 INJECTION, SOLUTION INTRAMUSCULAR; INTRAVENOUS at 01:11

## 2019-01-01 RX ADMIN — DILTIAZEM HYDROCHLORIDE 30 MG: 30 TABLET, FILM COATED ORAL at 16:36

## 2019-01-01 RX ADMIN — GLYCOPYRROLATE 0.2 MG: 0.2 INJECTION, SOLUTION INTRAMUSCULAR; INTRAVENOUS at 13:43

## 2019-01-01 RX ADMIN — DILTIAZEM HYDROCHLORIDE 30 MG: 30 TABLET, FILM COATED ORAL at 10:10

## 2019-01-01 RX ADMIN — AMIODARONE HYDROCHLORIDE 150 MG: 50 INJECTION, SOLUTION INTRAVENOUS at 06:47

## 2019-01-01 RX ADMIN — POTASSIUM CHLORIDE 10 MEQ: 10 INJECTION, SOLUTION INTRAVENOUS at 08:42

## 2019-01-01 RX ADMIN — POTASSIUM & SODIUM PHOSPHATES POWDER PACK 280-160-250 MG 2 PACKET: 280-160-250 PACK at 18:40

## 2019-01-01 RX ADMIN — BISACODYL 10 MG: 10 SUPPOSITORY RECTAL at 12:39

## 2019-01-01 RX ADMIN — POTASSIUM & SODIUM PHOSPHATES POWDER PACK 280-160-250 MG 2 PACKET: 280-160-250 PACK at 21:00

## 2019-01-01 RX ADMIN — GLYCOPYRROLATE 0.2 MG: 0.2 INJECTION, SOLUTION INTRAMUSCULAR; INTRAVENOUS at 04:00

## 2019-01-01 RX ADMIN — POTASSIUM CHLORIDE 10 MEQ: 10 INJECTION, SOLUTION INTRAVENOUS at 09:56

## 2019-01-01 RX ADMIN — CHLORHEXIDINE GLUCONATE 15 ML: 1.2 RINSE ORAL at 08:37

## 2019-01-01 RX ADMIN — CHLORHEXIDINE GLUCONATE 15 ML: 1.2 RINSE ORAL at 20:24

## 2019-01-01 RX ADMIN — MORPHINE SULFATE 5 MG: 100 SOLUTION ORAL at 10:23

## 2019-01-01 RX ADMIN — GLYCOPYRROLATE 0.2 MG: 0.2 INJECTION, SOLUTION INTRAMUSCULAR; INTRAVENOUS at 18:14

## 2019-01-01 RX ADMIN — DILTIAZEM HYDROCHLORIDE 30 MG: 30 TABLET, FILM COATED ORAL at 21:25

## 2019-01-01 RX ADMIN — CHLORHEXIDINE GLUCONATE 15 ML: 1.2 RINSE ORAL at 20:52

## 2019-01-01 RX ADMIN — DEXTROSE MONOHYDRATE 12.5 G: 25 INJECTION, SOLUTION INTRAVENOUS at 02:05

## 2019-01-01 RX ADMIN — PIPERACILLIN SODIUM,TAZOBACTAM SODIUM 3.38 G: 3; .375 INJECTION, POWDER, FOR SOLUTION INTRAVENOUS at 11:11

## 2019-01-01 RX ADMIN — MORPHINE SULFATE 2 MG: 2 INJECTION, SOLUTION INTRAMUSCULAR; INTRAVENOUS at 09:15

## 2019-01-01 RX ADMIN — HYDRALAZINE HYDROCHLORIDE 10 MG: 20 INJECTION INTRAMUSCULAR; INTRAVENOUS at 06:03

## 2019-01-01 RX ADMIN — SODIUM CHLORIDE AND POTASSIUM CHLORIDE: 9; 1.49 INJECTION, SOLUTION INTRAVENOUS at 11:14

## 2019-01-01 RX ADMIN — LORAZEPAM 1 MG: 2 INJECTION INTRAMUSCULAR; INTRAVENOUS at 19:41

## 2019-01-01 RX ADMIN — CEFEPIME HYDROCHLORIDE 1 G: 1 INJECTION, POWDER, FOR SOLUTION INTRAMUSCULAR; INTRAVENOUS at 11:00

## 2019-01-01 RX ADMIN — METRONIDAZOLE 500 MG: 500 INJECTION, SOLUTION INTRAVENOUS at 08:34

## 2019-01-01 RX ADMIN — APIXABAN 2.5 MG: 2.5 TABLET, FILM COATED ORAL at 10:10

## 2019-01-01 RX ADMIN — DILTIAZEM HYDROCHLORIDE 30 MG: 30 TABLET, FILM COATED ORAL at 11:43

## 2019-01-01 RX ADMIN — POTASSIUM CHLORIDE 10 MEQ: 10 INJECTION, SOLUTION INTRAVENOUS at 11:21

## 2019-01-01 RX ADMIN — MORPHINE SULFATE 2 MG: 2 INJECTION, SOLUTION INTRAMUSCULAR; INTRAVENOUS at 12:00

## 2019-01-01 RX ADMIN — POTASSIUM & SODIUM PHOSPHATES POWDER PACK 280-160-250 MG 2 PACKET: 280-160-250 PACK at 08:55

## 2019-01-01 RX ADMIN — METRONIDAZOLE 500 MG: 500 INJECTION, SOLUTION INTRAVENOUS at 21:48

## 2019-01-01 RX ADMIN — LORAZEPAM 1 MG: 2 INJECTION INTRAMUSCULAR; INTRAVENOUS at 00:03

## 2019-01-01 RX ADMIN — METOPROLOL TARTRATE 2.5 MG: 5 INJECTION INTRAVENOUS at 06:24

## 2019-01-01 RX ADMIN — METRONIDAZOLE 500 MG: 500 INJECTION, SOLUTION INTRAVENOUS at 10:07

## 2019-01-01 RX ADMIN — LORAZEPAM 1 MG: 2 INJECTION INTRAMUSCULAR; INTRAVENOUS at 12:00

## 2019-01-01 RX ADMIN — PROPOFOL 45 MCG/KG/MIN: 10 INJECTION, EMULSION INTRAVENOUS at 21:29

## 2019-01-01 RX ADMIN — LEVOFLOXACIN 750 MG: 5 INJECTION, SOLUTION INTRAVENOUS at 14:29

## 2019-01-01 RX ADMIN — DILTIAZEM HYDROCHLORIDE 7.5 MG/HR: 5 INJECTION INTRAVENOUS at 02:04

## 2019-01-01 RX ADMIN — ENOXAPARIN SODIUM 40 MG: 40 INJECTION SUBCUTANEOUS at 08:34

## 2019-01-01 RX ADMIN — METOPROLOL TARTRATE 12.5 MG: 25 TABLET ORAL at 18:06

## 2019-01-01 RX ADMIN — POTASSIUM & SODIUM PHOSPHATES POWDER PACK 280-160-250 MG 2 PACKET: 280-160-250 PACK at 17:19

## 2019-01-01 RX ADMIN — POTASSIUM & SODIUM PHOSPHATES POWDER PACK 280-160-250 MG 2 PACKET: 280-160-250 PACK at 12:39

## 2019-01-01 RX ADMIN — METRONIDAZOLE 500 MG: 500 INJECTION, SOLUTION INTRAVENOUS at 21:55

## 2019-01-01 RX ADMIN — DILTIAZEM HYDROCHLORIDE 2.5 MG/HR: 5 INJECTION INTRAVENOUS at 09:16

## 2019-01-01 RX ADMIN — GLYCOPYRROLATE 0.2 MG: 0.2 INJECTION, SOLUTION INTRAMUSCULAR; INTRAVENOUS at 19:41

## 2019-01-01 RX ADMIN — GLYCOPYRROLATE 0.2 MG: 0.2 INJECTION, SOLUTION INTRAMUSCULAR; INTRAVENOUS at 03:59

## 2019-01-01 RX ADMIN — MORPHINE SULFATE 2 MG: 2 INJECTION, SOLUTION INTRAMUSCULAR; INTRAVENOUS at 19:41

## 2019-01-01 RX ADMIN — DILTIAZEM HYDROCHLORIDE 5 MG/HR: 5 INJECTION INTRAVENOUS at 01:24

## 2019-01-01 RX ADMIN — METRONIDAZOLE 500 MG: 500 INJECTION, SOLUTION INTRAVENOUS at 20:53

## 2019-01-01 RX ADMIN — METOPROLOL TARTRATE 12.5 MG: 25 TABLET ORAL at 08:56

## 2019-01-01 RX ADMIN — SODIUM CHLORIDE AND POTASSIUM CHLORIDE: 9; 1.49 INJECTION, SOLUTION INTRAVENOUS at 09:20

## 2019-01-01 RX ADMIN — Medication 10 ML: at 12:11

## 2019-01-01 RX ADMIN — CEFEPIME HYDROCHLORIDE 1 G: 1 INJECTION, POWDER, FOR SOLUTION INTRAMUSCULAR; INTRAVENOUS at 17:24

## 2019-01-01 RX ADMIN — GLYCOPYRROLATE 0.2 MG: 0.2 INJECTION, SOLUTION INTRAMUSCULAR; INTRAVENOUS at 16:27

## 2019-01-01 RX ADMIN — GLYCOPYRROLATE 0.2 MG: 0.2 INJECTION, SOLUTION INTRAMUSCULAR; INTRAVENOUS at 16:57

## 2019-01-01 RX ADMIN — CEFEPIME HYDROCHLORIDE 1 G: 1 INJECTION, POWDER, FOR SOLUTION INTRAMUSCULAR; INTRAVENOUS at 17:20

## 2019-01-01 RX ADMIN — DILTIAZEM HYDROCHLORIDE 30 MG: 30 TABLET, FILM COATED ORAL at 17:21

## 2019-01-01 RX ADMIN — SUCCINYLCHOLINE CHLORIDE 100 MG: 20 INJECTION, SOLUTION INTRAMUSCULAR; INTRAVENOUS; PARENTERAL at 01:00

## 2019-01-01 RX ADMIN — SODIUM CHLORIDE AND POTASSIUM CHLORIDE: 9; 1.49 INJECTION, SOLUTION INTRAVENOUS at 23:53

## 2019-01-01 RX ADMIN — GLYCOPYRROLATE 0.2 MG: 0.2 INJECTION, SOLUTION INTRAMUSCULAR; INTRAVENOUS at 04:10

## 2019-01-01 RX ADMIN — POTASSIUM CHLORIDE 10 MEQ: 10 INJECTION, SOLUTION INTRAVENOUS at 13:27

## 2019-01-01 RX ADMIN — GLYCOPYRROLATE 0.2 MG: 0.2 INJECTION, SOLUTION INTRAMUSCULAR; INTRAVENOUS at 00:22

## 2019-01-01 RX ADMIN — SODIUM CHLORIDE AND POTASSIUM CHLORIDE: 9; 1.49 INJECTION, SOLUTION INTRAVENOUS at 12:40

## 2019-01-01 RX ADMIN — LEVOFLOXACIN 500 MG: 5 INJECTION, SOLUTION INTRAVENOUS at 15:05

## 2019-01-01 RX ADMIN — SODIUM CHLORIDE 130 ML/HR: 900 INJECTION, SOLUTION INTRAVENOUS at 00:25

## 2019-01-01 RX ADMIN — PROPOFOL 45 MCG/KG/MIN: 10 INJECTION, EMULSION INTRAVENOUS at 04:44

## 2019-01-01 RX ADMIN — POTASSIUM CHLORIDE 10 MEQ: 10 INJECTION, SOLUTION INTRAVENOUS at 12:03

## 2019-01-01 RX ADMIN — DEXTROSE MONOHYDRATE 12.5 G: 25 INJECTION, SOLUTION INTRAVENOUS at 00:00

## 2019-01-01 RX ADMIN — LORAZEPAM 1 MG: 2 SOLUTION, CONCENTRATE ORAL at 16:57

## 2019-01-01 RX ADMIN — SODIUM CHLORIDE AND POTASSIUM CHLORIDE: 9; 1.49 INJECTION, SOLUTION INTRAVENOUS at 21:55

## 2019-01-01 RX ADMIN — METOPROLOL TARTRATE 2.5 MG: 5 INJECTION INTRAVENOUS at 12:39

## 2019-01-01 RX ADMIN — POTASSIUM & SODIUM PHOSPHATES POWDER PACK 280-160-250 MG 2 PACKET: 280-160-250 PACK at 12:48

## 2019-01-01 RX ADMIN — SODIUM CHLORIDE 1000 ML: 900 INJECTION, SOLUTION INTRAVENOUS at 09:06

## 2019-01-01 RX ADMIN — POTASSIUM & SODIUM PHOSPHATES POWDER PACK 280-160-250 MG 2 PACKET: 280-160-250 PACK at 21:55

## 2019-01-01 RX ADMIN — CEFEPIME HYDROCHLORIDE 1 G: 1 INJECTION, POWDER, FOR SOLUTION INTRAMUSCULAR; INTRAVENOUS at 02:03

## 2019-01-01 RX ADMIN — METOPROLOL TARTRATE 5 MG: 5 INJECTION INTRAVENOUS at 00:30

## 2019-01-01 RX ADMIN — DILTIAZEM HYDROCHLORIDE 30 MG: 30 TABLET, FILM COATED ORAL at 16:37

## 2019-01-01 RX ADMIN — CHLORHEXIDINE GLUCONATE 15 ML: 1.2 RINSE ORAL at 09:04

## 2019-01-01 RX ADMIN — HYDRALAZINE HYDROCHLORIDE 10 MG: 20 INJECTION INTRAMUSCULAR; INTRAVENOUS at 21:23

## 2019-01-01 RX ADMIN — MUPIROCIN: 20 OINTMENT TOPICAL at 09:54

## 2019-01-01 RX ADMIN — MORPHINE SULFATE 2 MG: 2 INJECTION, SOLUTION INTRAMUSCULAR; INTRAVENOUS at 00:16

## 2019-01-01 RX ADMIN — CEFEPIME HYDROCHLORIDE 1 G: 1 INJECTION, POWDER, FOR SOLUTION INTRAMUSCULAR; INTRAVENOUS at 01:17

## 2019-01-01 RX ADMIN — POTASSIUM CHLORIDE 10 MEQ: 10 INJECTION, SOLUTION INTRAVENOUS at 14:49

## 2019-01-01 RX ADMIN — MORPHINE SULFATE 5 MG: 100 SOLUTION ORAL at 16:57

## 2019-01-01 RX ADMIN — GLYCOPYRROLATE 0.2 MG: 0.2 INJECTION, SOLUTION INTRAMUSCULAR; INTRAVENOUS at 23:41

## 2019-01-01 RX ADMIN — GLYCOPYRROLATE 0.2 MG: 0.2 INJECTION, SOLUTION INTRAMUSCULAR; INTRAVENOUS at 08:32

## 2019-01-01 RX ADMIN — LORAZEPAM 1 MG: 2 INJECTION INTRAMUSCULAR; INTRAVENOUS at 16:00

## 2019-01-01 RX ADMIN — IOPAMIDOL 100 ML: 755 INJECTION, SOLUTION INTRAVENOUS at 09:47

## 2019-01-01 RX ADMIN — HYDRALAZINE HYDROCHLORIDE 10 MG: 20 INJECTION INTRAMUSCULAR; INTRAVENOUS at 11:43

## 2019-01-01 RX ADMIN — METOPROLOL TARTRATE 2.5 MG: 5 INJECTION INTRAVENOUS at 18:00

## 2019-01-01 RX ADMIN — GLYCOPYRROLATE 0.2 MG: 0.2 INJECTION, SOLUTION INTRAMUSCULAR; INTRAVENOUS at 09:16

## 2019-01-01 RX ADMIN — VANCOMYCIN HYDROCHLORIDE 2000 MG: 10 INJECTION, POWDER, LYOPHILIZED, FOR SOLUTION INTRAVENOUS at 10:59

## 2019-01-01 RX ADMIN — POTASSIUM & SODIUM PHOSPHATES POWDER PACK 280-160-250 MG 2 PACKET: 280-160-250 PACK at 10:10

## 2019-01-01 RX ADMIN — GLYCOPYRROLATE 0.2 MG: 0.2 INJECTION, SOLUTION INTRAMUSCULAR; INTRAVENOUS at 20:19

## 2019-01-01 RX ADMIN — GLYCOPYRROLATE 0.2 MG: 0.2 INJECTION, SOLUTION INTRAMUSCULAR; INTRAVENOUS at 15:59

## 2019-01-01 RX ADMIN — PROPOFOL 30 MCG/KG/MIN: 10 INJECTION, EMULSION INTRAVENOUS at 14:19

## 2019-01-01 RX ADMIN — CHLORHEXIDINE GLUCONATE 15 ML: 1.2 RINSE ORAL at 20:32

## 2019-01-01 RX ADMIN — METOPROLOL TARTRATE 12.5 MG: 25 TABLET ORAL at 17:35

## 2019-01-01 RX ADMIN — LORAZEPAM 1 MG: 2 INJECTION INTRAMUSCULAR; INTRAVENOUS at 00:16

## 2019-01-01 RX ADMIN — ENOXAPARIN SODIUM 50 MG: 60 INJECTION, SOLUTION INTRAVENOUS; SUBCUTANEOUS at 09:55

## 2019-01-01 RX ADMIN — HEPARIN SODIUM 5000 UNITS: 5000 INJECTION, SOLUTION INTRAVENOUS; SUBCUTANEOUS at 11:43

## 2019-01-01 RX ADMIN — CEFEPIME HYDROCHLORIDE 1 G: 1 INJECTION, POWDER, FOR SOLUTION INTRAMUSCULAR; INTRAVENOUS at 13:29

## 2019-01-01 RX ADMIN — DILTIAZEM HYDROCHLORIDE 10 MG: 5 INJECTION INTRAVENOUS at 13:28

## 2019-01-01 RX ADMIN — MUPIROCIN: 20 OINTMENT TOPICAL at 08:35

## 2019-01-01 RX ADMIN — SODIUM CHLORIDE AND POTASSIUM CHLORIDE: 9; 1.49 INJECTION, SOLUTION INTRAVENOUS at 23:21

## 2019-01-01 RX ADMIN — MUPIROCIN: 20 OINTMENT TOPICAL at 09:04

## 2019-01-01 RX ADMIN — MORPHINE SULFATE 2 MG: 2 INJECTION, SOLUTION INTRAMUSCULAR; INTRAVENOUS at 04:11

## 2019-01-01 RX ADMIN — DILTIAZEM HYDROCHLORIDE 30 MG: 30 TABLET, FILM COATED ORAL at 08:56

## 2019-01-01 RX ADMIN — GLYCOPYRROLATE 0.2 MG: 0.2 INJECTION, SOLUTION INTRAMUSCULAR; INTRAVENOUS at 00:03

## 2019-01-01 RX ADMIN — PROPOFOL 10 MCG/KG/MIN: 10 INJECTION, EMULSION INTRAVENOUS at 20:19

## 2019-03-14 PROBLEM — J18.9 PNEUMONIA: Status: ACTIVE | Noted: 2019-01-01

## 2019-03-14 PROBLEM — A41.9 SEPSIS (HCC): Status: ACTIVE | Noted: 2019-01-01

## 2019-03-14 NOTE — ED PROVIDER NOTES
EMERGENCY DEPARTMENT HISTORY AND PHYSICAL EXAM      Date: 3/14/2019  Patient Name: Zia Cardoso    History of Presenting Illness     Chief Complaint   Patient presents with    Fever     Presents to the ED via EMS d/t lethargy, fever, vomiting, diarrhea x 24 hours. Non-verbal, wheelchair bound, decreased command following at baseline-this is her current mental status.  Vomiting    Diarrhea       History Provided By: Patient's Son and Patient's Daughter    HPI: Zia Cardoso, 80 y.o. female with PMHx significant for dementia and borderline diabetes, presents to EMS to the ED with cc of approximately 2-3 days of decreasing p.o. intake, vomiting, and diarrhea, with suspected abdominal pain. Family notes patient has been bent over apparently holding her abdomen due to pain. She is nonverbal at baseline due to history of dementia. Family has had difficulty getting patient to take any p.o. intake. They are not aware of any fever at home. They report some coughing, dry. No recent sick contacts. Patient is incontinent at baseline. HPI is extremely limited due to patient's nonverbal status. PCP: Christiana Wilson MD    No current facility-administered medications on file prior to encounter. Current Outpatient Medications on File Prior to Encounter   Medication Sig Dispense Refill    cephALEXin (KEFLEX) 500 mg capsule Take 1 Cap by mouth three (3) times daily. 15 Cap 0    traZODone (DESYREL) 50 mg tablet Take 25 mg by mouth nightly.  Cholecalciferol, Vitamin D3, 50,000 unit cap Take  by mouth every seven (7) days.  aspirin delayed-release 81 mg tablet Take 81 mg by mouth daily.  verapamil (CALAN) 240 mg capsule Take 240 mg by mouth daily.  hydrochlorothiazide (HYDRODIURIL) 25 mg tablet Take 25 mg by mouth daily.          Past History     Past Medical History:  Past Medical History:   Diagnosis Date    Alzheimer's dementia     Dementia     Diabetes ([facility] [address]     Hypertension  Stroke [facility]) 2011    TIA       Past Surgical History:  Past Surgical History:   Procedure Laterality Date    HX GYN      hysterectomy       Family History:  History reviewed. No pertinent family history. Social History:  Social History     Tobacco Use    Smoking status: Never Smoker    Smokeless tobacco: Never Used   Substance Use Topics    Alcohol use: No    Drug use: No       Allergies: Allergies   Allergen Reactions    Aspirin Other (comments)     Daugher states no allergy    Pcn [Penicillins] Nausea and Vomiting         Review of Systems   Review of Systems   Unable to perform ROS: Patient nonverbal       Physical Exam   Physical Exam     Nursing note and vitals reviewed. General appearance: Thin, emaciated elderly female laying on her right side, appears mildly distressed  Eyes: PERRL, EOMI, conjunctiva normal, anicteric sclera  HEENT: mucous membranes dry with dry lips, oropharynx is clear  Pulmonary: clear to auscultation bilaterally w/ limited respiratory effort  Cardiac: Tachycardia and regular rhythm, no murmurs, gallops, or rubs, 2+DP pulses, 2+ radial pulses  Abdomen: soft, nonfocal tenderness to palpation, nondistended, bowel sounds present, not rigid, probable left periumbilical hernia  MSK: no pre-tibial edema  Neuro: Awake, dementia, nonverbal, does not meaningfully participate in examination.   Skin: capillary refill brisk, warm to touch      Diagnostic Study Results     Labs -     Recent Results (from the past 24 hour(s))   EKG, 12 LEAD, INITIAL    Collection Time: 03/14/19  8:37 AM   Result Value Ref Range    Ventricular Rate 132 BPM    Atrial Rate 132 BPM    P-R Interval 166 ms    QRS Duration 130 ms    Q-T Interval 336 ms    QTC Calculation (Bezet) 497 ms    Calculated R Axis 8 degrees    Calculated T Axis 119 degrees    Diagnosis       Atrial fibrillation  Left ventricular hypertrophy with QRS widening and repolarization abnormality  Cannot rule out Septal infarct (cited on or before 14-MAR-2019)  When compared with ECG of 11-JAN-2015 09:52,  Significant changes have occurred  Confirmed by Vinay Alonzo ([id number]) on 3/14/2019 2:45:04 PM     CBC WITH AUTOMATED DIFF    Collection Time: 03/14/19  9:01 AM   Result Value Ref Range    WBC 15.0 (H) 3.6 - 11.0 K/uL    RBC 4.21 3.80 - 5.20 M/uL    HGB 12.6 11.5 - 16.0 g/dL    HCT 36.7 35.0 - 47.0 %    MCV 87.2 80.0 - 99.0 FL    MCH 29.9 26.0 - 34.0 PG    MCHC 34.3 30.0 - 36.5 g/dL    RDW 14.2 11.5 - 14.5 %    PLATELET 748 687 - 091 K/uL    MPV 12.0 8.9 - 12.9 FL    NRBC 0.0 0  WBC    ABSOLUTE NRBC 0.00 0.00 - 0.01 K/uL    NEUTROPHILS 89 (H) 32 - 75 %    LYMPHOCYTES 7 (L) 12 - 49 %    MONOCYTES 3 (L) 5 - 13 %    EOSINOPHILS 0 0 - 7 %    BASOPHILS 0 0 - 1 %    IMMATURE GRANULOCYTES 1 (H) 0.0 - 0.5 %    ABS. NEUTROPHILS 13.3 (H) 1.8 - 8.0 K/UL    ABS. LYMPHOCYTES 1.0 0.8 - 3.5 K/UL    ABS. MONOCYTES 0.4 0.0 - 1.0 K/UL    ABS. EOSINOPHILS 0.0 0.0 - 0.4 K/UL    ABS. BASOPHILS 0.0 0.0 - 0.1 K/UL    ABS. IMM.  GRANS. 0.2 (H) 0.00 - 0.04 K/UL    DF AUTOMATED     URINALYSIS W/ REFLEX CULTURE    Collection Time: 03/14/19  9:01 AM   Result Value Ref Range    Color ORANGE      Appearance CLOUDY (A) CLEAR      Specific gravity 1.026 1.003 - 1.030      pH (UA) 5.5 5.0 - 8.0      Protein 100 (A) NEG mg/dL    Glucose NEGATIVE  NEG mg/dL    Ketone TRACE (A) NEG mg/dL    Blood SMALL (A) NEG      Urobilinogen 1.0 0.2 - 1.0 EU/dL    Nitrites NEGATIVE  NEG      Leukocyte Esterase SMALL (A) NEG      WBC 5-10 0 - 4 /hpf    RBC 0-5 0 - 5 /hpf    Epithelial cells FEW FEW /lpf    Bacteria 4+ (A) NEG /hpf    UA:UC IF INDICATED URINE CULTURE ORDERED (A) CNI      Amorphous Crystals FEW (A) NEG      Hyaline cast 0-2 0 - 5 /lpf   BILIRUBIN, CONFIRM    Collection Time: 03/14/19  9:01 AM   Result Value Ref Range    Bilirubin UA, confirm POSITIVE (A) NEG     POC LACTIC ACID    Collection Time: 03/14/19  9:07 AM   Result Value Ref Range    Lactic Acid (POC) 1.91 0.40 - 2.00 mmol/L   INFLUENZA A & B AG (RAPID TEST)    Collection Time: 03/14/19  9:17 AM   Result Value Ref Range    Influenza A Antigen NEGATIVE  NEG      Influenza B Antigen NEGATIVE  NEG     LIPASE    Collection Time: 03/14/19  9:40 AM   Result Value Ref Range    Lipase 25 (L) 73 - 393 U/L   CK W/ CKMB & INDEX    Collection Time: 03/14/19  9:40 AM   Result Value Ref Range    CK 47 26 - 192 U/L    CK - MB 1.3 <3.6 NG/ML    CK-MB Index 2.8 (H) 0.0 - 2.5     METABOLIC PANEL, COMPREHENSIVE    Collection Time: 03/14/19  9:40 AM   Result Value Ref Range    Sodium 141 136 - 145 mmol/L    Potassium 3.0 (L) 3.5 - 5.1 mmol/L    Chloride 106 97 - 108 mmol/L    CO2 28 21 - 32 mmol/L    Anion gap 7 5 - 15 mmol/L    Glucose 107 (H) 65 - 100 mg/dL    BUN 25 (H) 6 - 20 MG/DL    Creatinine 0.81 0.55 - 1.02 MG/DL    BUN/Creatinine ratio 31 (H) 12 - 20      GFR est AA >60 >60 ml/min/1.73m2    GFR est non-AA >60 >60 ml/min/1.73m2    Calcium 8.6 8.5 - 10.1 MG/DL    Bilirubin, total 2.2 (H) 0.2 - 1.0 MG/DL    ALT (SGPT) 10 (L) 12 - 78 U/L    AST (SGOT) 16 15 - 37 U/L    Alk. phosphatase 44 (L) 45 - 117 U/L    Protein, total 6.4 6.4 - 8.2 g/dL    Albumin 2.5 (L) 3.5 - 5.0 g/dL    Globulin 3.9 2.0 - 4.0 g/dL    A-G Ratio 0.6 (L) 1.1 - 2.2     TROPONIN I    Collection Time: 03/14/19  9:40 AM   Result Value Ref Range    Troponin-I, Qt. <0.05 <0.05 ng/mL   SAMPLES BEING HELD    Collection Time: 03/14/19  9:40 AM   Result Value Ref Range    SAMPLES BEING HELD 1LAV     COMMENT        Add-on orders for these samples will be processed based on acceptable specimen integrity and analyte stability, which may vary by analyte.    EKG, 12 LEAD, SUBSEQUENT    Collection Time: 03/14/19 12:18 PM   Result Value Ref Range    Ventricular Rate 138 BPM    Atrial Rate 133 BPM    QRS Duration 126 ms    Q-T Interval 358 ms    QTC Calculation (Bezet) 542 ms    Calculated R Axis -2 degrees    Calculated T Axis 105 degrees    Diagnosis       Atrial fibrillation  Nonspecific intraventricular block  Cannot rule out Septal infarct , age undetermined    Confirmed by Haleigh Tabares ([id number]) on 3/14/2019 2:45:43 PM     EKG, 12 LEAD, SUBSEQUENT    Collection Time: 03/14/19 12:58 PM   Result Value Ref Range    Ventricular Rate 98 BPM    Atrial Rate 68 BPM    QRS Duration 122 ms    Q-T Interval 368 ms    QTC Calculation (Bezet) 469 ms    Calculated R Axis -14 degrees    Calculated T Axis 23 degrees    Diagnosis       Atrial fibrillation with premature ventricular or aberrantly conducted   complexes  Nonspecific intraventricular conduction delay  Nonspecific ST and T wave abnormality    Confirmed by Haleigh Tabares ([id number]) on 3/14/2019 2:45:50 PM     GLUCOSE, POC    Collection Time: 03/14/19  2:46 PM   Result Value Ref Range    Glucose (POC) 116 (H) 65 - 100 mg/dL    Performed by Iggy Pulido (PCT)    TROPONIN I    Collection Time: 03/14/19  4:33 PM   Result Value Ref Range    Troponin-I, Qt. <0.05 <0.05 ng/mL   CK W/ CKMB & INDEX    Collection Time: 03/14/19  4:33 PM   Result Value Ref Range    CK 50 26 - 192 U/L    CK - MB 1.9 <3.6 NG/ML    CK-MB Index 3.8 (H) 0.0 - 2.5           Radiologic Studies -   CT ABD PELV W CONT   Final Result   IMPRESSION:      1. There is fecal stasis in the rectum. 2. There is ventral wall hernia containing fat. XR CHEST PORT   Final Result   IMPRESSION:   1. Diffuse airspace disease throughout the right lung consistent with pneumonia. Follow-up to resolution is suggested. CT Results  (Last 48 hours)               03/14/19 0950  CT ABD PELV W CONT Final result    Impression:  IMPRESSION:       1. There is fecal stasis in the rectum. 2. There is ventral wall hernia containing fat. Narrative:  EXAM: CT ABD PELV W CONT       INDICATION: Abdominal pain; abd pain, fever, vomiting, diarrhea, AMS       COMPARISON:       CONTRAST: 100 mL of Isovue-370.        TECHNIQUE:    Following the uneventful intravenous administration of contrast, thin axial images were obtained through the abdomen and pelvis. Coronal and sagittal   reconstructions were generated. Oral contrast was not administered. CT dose   reduction was achieved through use of a standardized protocol tailored for this   examination and automatic exposure control for dose modulation. FINDINGS:    LUNG BASES: There is airspace disease in the right lung consistent with   pneumonia. INCIDENTALLY IMAGED HEART AND MEDIASTINUM: There is cardiomegaly   LIVER: No mass or biliary dilatation. GALLBLADDER: Unremarkable. SPLEEN: No mass. PANCREAS: No mass or ductal dilatation. There is a 7 mm cyst in the pancreatic   neck   ADRENALS: Unremarkable. KIDNEYS: No mass, calculus, or hydronephrosis. STOMACH: Unremarkable. There is a right renal cyst   SMALL BOWEL: No dilatation or wall thickening. COLON: There is fecal stasis in the rectum   APPENDIX: Not distended   PERITONEUM: No ascites or pneumoperitoneum. RETROPERITONEUM: No lymphadenopathy or aortic aneurysm. REPRODUCTIVE ORGANS: Patient status post hysterectomy   URINARY BLADDER: No mass or calculus. BONES: No destructive bone lesion. There is dextroconvex scoliosis   ADDITIONAL COMMENTS: There is a ventral wall hernia containing fat               CXR Results  (Last 48 hours)               03/14/19 0934  XR CHEST PORT Final result    Impression:  IMPRESSION:   1. Diffuse airspace disease throughout the right lung consistent with pneumonia. Follow-up to resolution is suggested. Narrative:  EXAM: XR CHEST PORT       INDICATION: sepsis       COMPARISON: 10/26/2018       FINDINGS: A portable AP radiograph of the chest was obtained at 0921 hours. The   patient is on a cardiac monitor. There is diffuse airspace disease throughout the right lung consistent with   pneumonia. Follow-up to resolution is suggested. Left lung is clear. There is mild cardiomegaly.                Medical Decision Making   I am the first provider for this patient. I reviewed the vital signs, available nursing notes, past medical history, past surgical history, family history and social history. Vital Signs-Reviewed the patient's vital signs. Patient Vitals for the past 12 hrs:   Temp Pulse Resp BP SpO2   03/14/19 1100 99.6 °F (37.6 °C) (!) 129 23 92/56 94 %   03/14/19 0904  (!) 133 (!) 33 127/74 92 %   03/14/19 0830 (!) 101.1 °F (38.4 °C) (!) 132 30 108/67 93 %       Pulse Oximetry Analysis - 93% on RA    Cardiac Monitor:   Rate: 132 bpm  Rhythm: Normal Sinus Rhythm     EKG interpretation: (Preliminary)  0837  Rhythm: sinus tachycardia and PVC's; and regular . Rate (approx.): 132; Axis: normal; AL interval: 166; QRS interval: 130; QTc: 497; ST/T wave: nonspecific ST changes, no MARCE/STD  Written by PARKER Villaltaibsusan, as dictated by Micky Andrea. Carolina Keating MD    EKG interpretation: (Preliminary)  1218  Rhythm: sinus tachycardia and intraventricular delay; and regular . Rate (approx.): 38; Axis: left axis deviation; QRS interval: 126; QTc: 542; ST/T wave: nonspecific ST changes, no MARCE/STD  Written by PARKER Villaltaibsusan, as dictated by Micky Andrea. Carolina Keating MD    EKG interpretation: (Repeat)  1258  Rhythm: atrial fibrillation with premature ventricular or aberrantly conducted complexes; and irregular. Rate (approx.): 98; Axis: left axis deviation; QRS interval: 122; QTc: 469; ST/T wave: no MARCE/STD  Written by PARKER Villaltaibe, as dictated by Micky Andrea. Carolina Keating MD    Records Reviewed: Nursing Notes, Old Medical Records, Previous electrocardiograms and Previous Laboratory Studies    Provider Notes (Medical Decision Making):   49-year-old female nonverbal at baseline due to dementia presents with apparent abdominal pain, vomiting, diarrhea. Vital signs are abnormal suggestive of SIRS. Grimace noted on examination suggestive of abdominal pain. Will check labs, IV fluids, antipyretics, and CT imaging given age and nonverbal status.   Initial concern is for possible AGE, colitis, diverticulitis, bowel perforation, dehydration, metabolic disorder, UTI, renal stone, biliary colic, pancreatitis. Consider lower lobe pneumonia as patient has had recent cough. Will reassess after resuscitation, likely requires hospitalization. ED Course:   Initial assessment performed. The patients presenting problems have been discussed, and they are in agreement with the care plan formulated and outlined with them. I have encouraged them to ask questions as they arise throughout their visit. ED Course as of Mar 14 1414   Thu Mar 14, 2019   1020 Updated family on current lab and imaging results including right-sided pneumonia. Will treat for CAP as she has had no recent hospitalizations. [FH]   6129 Also consider aspiration given patient's recent vomiting, nonverbal status, and mental status change according to the family. Will cover Zosyn monotherapy for now. Additional antibiotics to be discussed with hospitalist.  [FH]    to bedside by nursing for variable heart rate. Heart rate now 70s-90s, and appears somewhat regular. Additional EKG was obtained which does appear to show an atrial arrhythmia suggestive of atrial fibrillation. Suspect underlying sepsis induced atrial dysrhythmia, will continue to treat with IV fluids and antibiotics. [FH]      ED Course User Index  [FH] Scarlett Pham., MD     CONSULT NOTE:   11:30 PM  Marli Barton. Kellen Guerra MD spoke with Ariela Pack MD   Specialty: Hospitalist  Discussed pt's hx, disposition, and available diagnostic and imaging results. Reviewed care plans. Consultant will evaluate pt for admission. Written by Salvador Rashid, ED Scribe, as dictated by Marli Barton.  Kellen Guerra MD    CRITICAL CARE NOTE :    12:00 PM    IMPENDING DETERIORATION -Respiratory, Cardiovascular and Metabolic  ASSOCIATED RISK FACTORS - Dysrhythmia, Metabolic changes and Dehydration  MANAGEMENT- Bedside Assessment and Supervision of Care  INTERPRETATION - Xrays, CT Scan, ECG and Blood Pressure  INTERVENTIONS - hemodynamic mngmt/fluid resuscitation/antipyretics/Abx  CASE REVIEW - Hospitalist, Nursing and Family  TREATMENT RESPONSE -Improved and Stable  PERFORMED BY - Self    NOTES   :    I have spent 40 minutes of critical care time involved in lab review, consultations with specialist, family decision- making, bedside attention and documentation. During this entire length of time I was immediately available to the patient . Teddy Marcus. Coleman Chavez MD    Disposition:  ADMIT NOTE:  12:00 PM  The patient is being admitted to the hospital by Vika Watson MD.  The results of their tests and reasons for their admission have been discussed with the patient and/or available family. They convey agreement and understanding for the need to be admitted and for their admission diagnosis. PLAN:  1. Admit to hospitalist      Diagnosis     Clinical Impression:   1. Pneumonia of right lung due to infectious organism, unspecified part of lung    2. Clinical sepsis ([address]    3. Ventral hernia without obstruction or gangrene    4.  Tachycardia        Attestations:

## 2019-03-14 NOTE — PROGRESS NOTES
Pharmacy Clarification of the Prior to Admission Medication Regimen Retrospective to the Admission Medication Reconciliation    The patient was not interviewed regarding clarification of the prior to admission medication regimen due to AMS at baseline. Patient's daughter was present in room and stated the patient's son manages the patient's medications. MHT called the patient's son, Deneice Severance, [phone], who was able to verify the patient's PTA medication history. Information Obtained From: Patient's son, RX Query    Recommendations/Findings: The following amendments were made to the patient's active medication list on file at [facility]:     1) Additions: NONE    2) Removals:    cephalexin    3) Changes: NONE    4) Pertinent Pharmacy Findings:   Patient's son, Deep Karimi, manages the patient's medications     PTA medication list was corrected to the following:     Prior to Admission Medications   Prescriptions Last Dose Informant Patient Reported? Taking? Cholecalciferol, Vitamin D3, 50,000 unit cap 3/12/2019 at Unknown time Child Yes Yes   Sig: Take 50,000 Units by mouth every Tuesday. aspirin delayed-release 81 mg tablet 3/12/2019 at Unknown time Child Yes Yes   Sig: Take 81 mg by mouth daily. hydrochlorothiazide (HYDRODIURIL) 25 mg tablet 3/12/2019 at Unknown time Child Yes Yes   Sig: Take 25 mg by mouth daily. traZODone (DESYREL) 50 mg tablet 3/12/2019 at Unknown time Child Yes Yes   Sig: Take 25 mg by mouth nightly. verapamil (CALAN) 240 mg capsule 3/12/2019 at Unknown time Child Yes Yes   Sig: Take 240 mg by mouth daily.       Facility-Administered Medications: None          Thank you,  Lior Vang CPhT  Medication History Pharmacy Technician

## 2019-03-14 NOTE — PROGRESS NOTES
TRANSFER - IN REPORT:    Verbal report received from Lety Hale RN on Saud Tinoco  being received from ED for routine progression of care      Report consisted of patients Situation, Background, Assessment and   Recommendations(SBAR). Information from the following report(s) SBAR, Kardex and ED Summary was reviewed with the receiving nurse. Opportunity for questions and clarification was provided. Assessment completed upon patients arrival to unit and care assumed. Patient had a large BM, cleaned and applied Purewick catheter with GARTH Campbell.

## 2019-03-14 NOTE — ED NOTES
TRANSFER - OUT REPORT:    Verbal report given to Lorena Boucher RN (name) on Jasen Olson  being transferred to ED room 28 for routine progression of care       Report consisted of patients Situation, Background, Assessment and   Recommendations(SBAR). Information from the following report(s) SBAR and ED Summary was reviewed with the receiving nurse. Lines:   Peripheral IV 03/14/19 Left Forearm (Active)   Site Assessment Clean, dry, & intact 3/14/2019  9:23 AM   Phlebitis Assessment 0 3/14/2019  9:23 AM   Infiltration Assessment 0 3/14/2019  9:23 AM   Dressing Status Clean, dry, & intact 3/14/2019  9:23 AM        Opportunity for questions and clarification was provided. Accompanied by family.

## 2019-03-14 NOTE — PROGRESS NOTES
Pharmacy Automatic Renal Dosing Protocol - Antimicrobials    Indication for Antimicrobials: Aspiration pneumonia    Current Regimen of Each Antimicrobial:  Levofloxacin 500 mg every day; Started 3/14/19, Day #1  Metronidazole 500 mg Q8H; Started 3/14/19, Day #1  Zosyn 3.375 g x 1 in ED    Significant Cultures:   3/14/19 Blood and urine cx sent    Radiology / Imaging results: (X-ray, CT scan or MRI):  IMPRESSION:  1. Diffuse airspace disease throughout the right lung consistent with pneumonia. Labs:  Recent Labs     19  0940 19  0901   CREA 0.81  --    BUN 25*  --    WBC  --  15.0*     Temp (24hrs), Av.4 °F (38 °C), Min:99.6 °F (37.6 °C), Max:101.1 °F (38.4 °C)    Creatinine Clearance: 44.1 mL/min    Impression/Plan:   · Change Levofloxacin to 750 mg Q48H based on indication /renal function   · Change Metronidazole 500 mg to Q12H based on indication  · Antimicrobial stop date: pending     Pharmacy will follow daily and adjust medications as appropriate for renal function and/or serum levels. Thank you,  Franco Valladares, [address]    Recommended duration of therapy  [email or web address]. docx    Renal Dosing  [email or web address]/Pharmacy/[email or web address]/Renal%[id number]. pdf

## 2019-03-14 NOTE — H&P
Hospitalist Admission Note    NAME: Rehana Sanderson   :  3/7/1930   MRN:  [id number]     Date/Time:  3/14/2019 12:30 PM    Patient PCP: Kaye Rolon MD  ______________________________________________________________________   Assessment & Plan:  Sepsis, POA due to  Extensive right sided aspiration pneumonia, POA  UTI, POA  Hypokalemia, POA. Due to vomiting and diarrhea  Sinus tach vs. SVT  Fecal stasis in rectum, POA. Having diarrhea around fecal stasis. HTN with blood pressure trending down  Dementia  DM  Decubitus buttock ulcer, POA and Sacral redness, POA per ER nurse    --given zosyn in ER. Patient with allergy to PCN listed nausea and vomiting. She has been having n/v at home leading to aspiration so will not continue zosyn. Put on levaquin and flagyl. --nebs prn  --follow up blood and urine cultures. Flu negative. --replace potassium with IV KCl 40meq. Check mag and phos  --IVF hydration  --soap suds enema. --accucheks q6h.    --npo for now.  --diltiazem 10mg x 1 if bp >100. If tolerate diltiazem and is hypertensive, change verapamil to metoprolol IV while npo. --wound care consult    Family no longer at bedside. Patient with advance dementia and nonverbal, wheelchair bound baseline. Son apparently wants full code. Body mass index is 24.21 kg/m². Code: full  DVT prophylaxis: lovenox  Surrogate decision maker:  Katelin Ny [phone]        Subjective:   CHIEF COMPLAINT:  Fever, vomiting, diarrhea    HISTORY OF PRESENT ILLNESS:     Rehana Sanderson is a 80 y.o.  female with HTN, DM currently not on medication for DM, dementia, hx TIA, who is reported to be aphasic at baseline. No family at bedside. History per ER physician Dr. Davonte Love. See his note for details. Per Dr. Davonte Love \"approximately 2-3 days of decreasing p.o. intake, vomiting, and diarrhea, with suspected abdominal pain.   Family notes patient has been bent over apparently holding her abdomen due to pain. She is nonverbal at baseline due to history of dementia. Family has had difficulty getting patient to take any p.o. intake. They are not aware of any fever at home. They report some coughing, dry. No recent sick contacts. Patient is incontinent at baseline. \"    Last admitted 3/15 with delirium due to uti, hypoglycemia. Noted that palliative care consult obtained during this admission as poor long term prognosis but son wished for her to remain full code. We were asked to admit for work up and evaluation of the above problems. Past Medical History:   Diagnosis Date    Alzheimer's dementia     Dementia     Diabetes ([facility] [address]     Hypertension     Stroke ([facility] [address] 2011    TIA      Past Surgical History:   Procedure Laterality Date    HX GYN      hysterectomy     Social History     Tobacco Use    Smoking status: Never Smoker    Smokeless tobacco: Never Used   Substance Use Topics    Alcohol use: No      History reviewed. No pertinent family history. Unable to obtain due to patient nonverbal  Allergies   Allergen Reactions    Aspirin Other (comments)     Daugher states no allergy    Pcn [Penicillins] Nausea and Vomiting        Prior to Admission medications    Medication Sig Start Date End Date Taking? Authorizing Provider   traZODone (DESYREL) 50 mg tablet Take 25 mg by mouth nightly. Yes Other, MD Rajiv   Cholecalciferol, Vitamin D3, 50,000 unit cap Take 50,000 Units by mouth every Tuesday. Yes Boo, MD Rajiv   aspirin delayed-release 81 mg tablet Take 81 mg by mouth daily. Yes Other, MD Rajiv   verapamil (CALAN) 240 mg capsule Take 240 mg by mouth daily. Yes Boo, MD Rajiv   hydrochlorothiazide (HYDRODIURIL) 25 mg tablet Take 25 mg by mouth daily. Yes Other, MD Rajiv     REVIEW OF SYSTEMS:  POSITIVE= Bold.   Negative = normal text  Unable to obtain due to mental status      Objective:   VITALS:    Visit Vitals  BP 92/56 (BP 1 Location: Right arm, BP Patient Position: At rest)   Pulse (!) 129   Temp 99.6 °F (37.6 °C)   Resp 23   Ht 5' 6\" (1.676 m)   Wt 68 kg (150 lb)   SpO2 94%   BMI 24.21 kg/m²     Temp (24hrs), Av.4 °F (38 °C), Min:99.6 °F (37.6 °C), Max:101.1 °F (38.4 °C)    Body mass index is 24.21 kg/m². PHYSICAL EXAM:    General:    Alert, nonverbal, thin female, no respiratory distress, not following commands. Appears stated age. HEENT: Atraumatic, anicteric sclerae, pink conjunctivae     No oral ulcers, mucosa moist, throat clear. Hearing intact. Neck:  Supple, symmetrical,  thyroid: non tender  Lungs:   Crackles throughout right. No Wheezing or Rhonchi. No rales. Chest wall:  No tenderness  No Accessory muscle use. Heart:   Regular  rhythm,  Mild tachycardic 100s No  murmur   No gallop. No edema. Abdomen:   Soft, non-tender. Not distended. Bowel sounds normal. No masses  Extremities: No cyanosis. No clubbing  Skin:     Not pale Not Jaundiced  No rashes   Psych:  Not anxious or agitated. Neurologic: +muscle rigidity in arms b/l. Not making eye contact or tracking. No facial asymmetry. Aphasic, not   following commands. Peripheral pulse: Bilateral, DP, 2+  Capillary refill:  normal    IMAGING RESULTS:   []       I have personally reviewed the actual   []     CXR  []     CT scan  CXR: consolidation throughout right. Cardiomegaly.   CT :  EKG: narrow complex tachycardia, no P waves  with PVCs   ________________________________________________________________________  Care Plan discussed with:    Comments   Patient     Family      RN     Care Manager                    Consultant:  [employer] [place] [employer]   ________________________________________________________________________  Prophylaxis:  GI none   DVT lovenox   ________________________________________________________________________  Recommended Disposition:   Home with Family y   HH/PT/OT/RN y   SNF/LTC    VINI    ________________________________________________________________________  Code Status:  Full Code y   DNR/DNI    ________________________________________________________________________  TOTAL TIME:  45 minutes      Comments    y Reviewed previous records       ______________________________________________________________________  Jamie Muro MD      Procedures: see electronic medical records for all procedures/Xrays and details which were not copied into this note but were reviewed prior to creation of Plan. LAB DATA REVIEWED:    Recent Results (from the past 24 hour(s))   EKG, 12 LEAD, INITIAL    Collection Time: 03/14/19  8:37 AM   Result Value Ref Range    Ventricular Rate 132 BPM    Atrial Rate 132 BPM    P-R Interval 166 ms    QRS Duration 130 ms    Q-T Interval 336 ms    QTC Calculation (Bezet) 497 ms    Calculated R Axis 8 degrees    Calculated T Axis 119 degrees    Diagnosis       Sinus tachycardia with frequent premature ventricular complexes  Left ventricular hypertrophy with QRS widening and repolarization abnormality  Cannot rule out Septal infarct (cited on or before 14-MAR-2019)  When compared with ECG of 11-JAN-2015 09:52,  Significant changes have occurred     CBC WITH AUTOMATED DIFF    Collection Time: 03/14/19  9:01 AM   Result Value Ref Range    WBC 15.0 (H) 3.6 - 11.0 K/uL    RBC 4.21 3.80 - 5.20 M/uL    HGB 12.6 11.5 - 16.0 g/dL    HCT 36.7 35.0 - 47.0 %    MCV 87.2 80.0 - 99.0 FL    MCH 29.9 26.0 - 34.0 PG    MCHC 34.3 30.0 - 36.5 g/dL    RDW 14.2 11.5 - 14.5 %    PLATELET 550 973 - 386 K/uL    MPV 12.0 8.9 - 12.9 FL    NRBC 0.0 0  WBC    ABSOLUTE NRBC 0.00 0.00 - 0.01 K/uL    NEUTROPHILS 89 (H) 32 - 75 %    LYMPHOCYTES 7 (L) 12 - 49 %    MONOCYTES 3 (L) 5 - 13 %    EOSINOPHILS 0 0 - 7 %    BASOPHILS 0 0 - 1 %    IMMATURE GRANULOCYTES 1 (H) 0.0 - 0.5 %    ABS. NEUTROPHILS 13.3 (H) 1.8 - 8.0 K/UL    ABS. LYMPHOCYTES 1.0 0.8 - 3.5 K/UL    ABS. MONOCYTES 0.4 0.0 - 1.0 K/UL    ABS. EOSINOPHILS 0.0 0.0 - 0.4 K/UL    ABS. BASOPHILS 0.0 0.0 - 0.1 K/UL    ABS. IMM.  GRANS. 0.2 (H) 0.00 - 0.04 K/UL    DF AUTOMATED     URINALYSIS W/ REFLEX CULTURE    Collection Time: 03/14/19  9:01 AM   Result Value Ref Range    Color ORANGE      Appearance CLOUDY (A) CLEAR      Specific gravity 1.026 1.003 - 1.030      pH (UA) 5.5 5.0 - 8.0      Protein 100 (A) NEG mg/dL    Glucose NEGATIVE  NEG mg/dL    Ketone TRACE (A) NEG mg/dL    Blood SMALL (A) NEG      Urobilinogen 1.0 0.2 - 1.0 EU/dL    Nitrites NEGATIVE  NEG      Leukocyte Esterase SMALL (A) NEG      WBC 5-10 0 - 4 /hpf    RBC 0-5 0 - 5 /hpf    Epithelial cells FEW FEW /lpf    Bacteria 4+ (A) NEG /hpf    UA:UC IF INDICATED URINE CULTURE ORDERED (A) CNI      Amorphous Crystals FEW (A) NEG      Hyaline cast 0-2 0 - 5 /lpf   BILIRUBIN, CONFIRM    Collection Time: 03/14/19  9:01 AM   Result Value Ref Range    Bilirubin UA, confirm POSITIVE (A) NEG     POC LACTIC ACID    Collection Time: 03/14/19  9:07 AM   Result Value Ref Range    Lactic Acid (POC) 1.91 0.40 - 2.00 mmol/L   INFLUENZA A & B AG (RAPID TEST)    Collection Time: 03/14/19  9:17 AM   Result Value Ref Range    Influenza A Antigen NEGATIVE  NEG      Influenza B Antigen NEGATIVE  NEG     LIPASE    Collection Time: 03/14/19  9:40 AM   Result Value Ref Range    Lipase 25 (L) 73 - 393 U/L   CK W/ CKMB & INDEX    Collection Time: 03/14/19  9:40 AM   Result Value Ref Range    CK 47 26 - 192 U/L    CK - MB 1.3 <3.6 NG/ML    CK-MB Index 2.8 (H) 0.0 - 2.5     METABOLIC PANEL, COMPREHENSIVE    Collection Time: 03/14/19  9:40 AM   Result Value Ref Range    Sodium 141 136 - 145 mmol/L    Potassium 3.0 (L) 3.5 - 5.1 mmol/L    Chloride 106 97 - 108 mmol/L    CO2 28 21 - 32 mmol/L    Anion gap 7 5 - 15 mmol/L    Glucose 107 (H) 65 - 100 mg/dL    BUN 25 (H) 6 - 20 MG/DL    Creatinine 0.81 0.55 - 1.02 MG/DL    BUN/Creatinine ratio 31 (H) 12 - 20      GFR est AA >60 >60 ml/min/1.73m2    GFR est non-AA >60 >60 ml/min/1.73m2    Calcium 8.6 8.5 - 10.1 MG/DL    Bilirubin, total 2.2 (H) 0.2 - 1.0 MG/DL    ALT (SGPT) 10 (L) 12 - 78 U/L    AST (SGOT) 16 15 - 37 U/L    Alk. phosphatase 44 (L) 45 - 117 U/L    Protein, total 6.4 6.4 - 8.2 g/dL    Albumin 2.5 (L) 3.5 - 5.0 g/dL    Globulin 3.9 2.0 - 4.0 g/dL    A-G Ratio 0.6 (L) 1.1 - 2.2     TROPONIN I    Collection Time: 03/14/19  9:40 AM   Result Value Ref Range    Troponin-I, Qt. <0.05 <0.05 ng/mL   SAMPLES BEING HELD    Collection Time: 03/14/19  9:40 AM   Result Value Ref Range    SAMPLES BEING HELD 1LAV     COMMENT        Add-on orders for these samples will be processed based on acceptable specimen integrity and analyte stability, which may vary by analyte.    EKG, 12 LEAD, SUBSEQUENT    Collection Time: 03/14/19 12:18 PM   Result Value Ref Range    Ventricular Rate 138 BPM    Atrial Rate 133 BPM    QRS Duration 126 ms    Q-T Interval 358 ms    QTC Calculation (Bezet) 542 ms    Calculated R Axis -2 degrees    Calculated T Axis 105 degrees    Diagnosis       Wide QRS tachycardia  Nonspecific intraventricular block  Cannot rule out Septal infarct , age undetermined  When compared with ECG of 14-MAR-2019 08:37,  MANUAL COMPARISON REQUIRED, DATA IS UNCONFIRMED

## 2019-03-15 PROBLEM — I48.0 PAROXYSMAL ATRIAL FIBRILLATION (HCC): Status: ACTIVE | Noted: 2019-01-01

## 2019-03-15 NOTE — PROGRESS NOTES
Pharmacy Automatic Renal Dosing Protocol - Antimicrobials    Indication for Antimicrobials: Aspiration pneumonia    Current Regimen of Each Antimicrobial:  Levofloxacin 750 mg every 48 H; Started 3/14/19, Day #2  Metronidazole 500 mg Q8H; Started 3/14/19, Day #2    Previous abx:   Zosyn 3.375 g x 1 in ED    Significant Cultures:   3/14/19 Blood and urine cx sent    Radiology / Imaging results: (X-ray, CT scan or MRI):  IMPRESSION:  1. Diffuse airspace disease throughout the right lung consistent with pneumonia. Labs:  Recent Labs     03/15/19  0129 19  0940 19  0901   CREA 0.54* 0.81  --    BUN 15 25*  --    WBC 12.7*  --  15.0*     Temp (24hrs), Av.8 °F (37.1 °C), Min:97.9 °F (36.6 °C), Max:99.6 °F (37.6 °C)    Creatinine Clearance: 51 mL/min    Impression/Plan:   · Change Levofloxacin to 500 mg Q24H based on indication /renal function. The dose was adjusted to 500 mg q 24 hours (8 - 10 mg/kg)  · Continue with Metronidazole 500 mg Q12H   · Antimicrobial stop date: pending     Pharmacy will follow daily and adjust medications as appropriate for renal function and/or serum levels. Thank you,  Rd South, PHARMD    Recommended duration of therapy  [email or web address]/[place]/[facility]/Pharmacy/Clinical%[email or web address]. docx    Renal Dosing  [email or web address]/[place]/[facility]/Pharmacy/Clinical%20Companion/Renal%[email or web address]. pdf

## 2019-03-15 NOTE — PROGRESS NOTES
Bedside and Verbal shift change report given to Sandra Verma RN (oncoming nurse) by Marjorie PITTMAN RN (offgoing nurse). Report included the following information SBAR, Kardex, ED Summary, [facility] and Recent Results. Patient resting comfortably, and side rails up. 2319 Pt's MEWS is 4. HR is 127. Administered IV metoprolol. 0010 Messaged hospitalist on tele hospitalist link informing of the pt's sustained HR in the 120's for about 45 minutes after giving IV metoprolol at 2319. Awaiting a response. 0031. Pt's MEWs is a 4. HR is 126. Sandro Hart MD aware. Administered IV metopolol. 0050 Transmitted EKG. Discussed with Sandro Hart MD.    Zuri  called and stated the pt converted to NS. Pt's HR is 85.    0330 Verbal shift change report given to Danuta León RN (oncoming nurse) by Sandra Verma RN (offgoing nurse). Report included the following information SBAR, Kardex, Intake/Output, MAR and Recent Results.

## 2019-03-15 NOTE — PROGRESS NOTES
Problem: Dysphagia (Adult)  Goal: *Acute Goals and Plan of Care (Insert Text)  3/15/2019  Speech path goals:  1. Pt will tolerate purees and thins with no overt s/s of aspiration. Speech LAnguage Pathology bedside swallow evaluation  Patient: Justyna Taylor (55 y.o. female)  Date: 3/15/2019  Primary Diagnosis: Sepsis ([address] [address] [A41.9]  UTI (urinary tract infection) [N39.0]  Pneumonia [J18.9]       Precautions:        ASSESSMENT :  Based on the objective data described below, the patient presents with mod to severe oral and mild to mod pharyngeal dysphagia. Orally she accepted po via straw and spoon. Very slow oral manipulation and some oral holding noted. She would swallow what she had been holdng if offered the spoon or straw. Pharyngeal swallow characterized by mild swallow delay and good hyolaryngeal elevation. She was very distracted and looking around the room and examining my face. Good eye contact. Nonverbal but vocalized once. Intake will be slow and poor most likely. Discussed this with [facility] staff. There was no coughing but she is at risk to aspirate given her mental status. She is going to have a hard time staying hydrated and nourished if po intake does not . This needs to be addressed with the son. Feeding tubes should not be offered in this population. This is most likely end stage dementia. Son is not present for education. Patient will benefit from skilled intervention to address the above impairments.   Patients rehabilitation potential is considered to be Fair  Factors which may influence rehabilitation potential include:   []            None noted  [x]            Mental ability/status  [x]            Medical condition  [x]            Home/family situation and support systems  []            Safety awareness  []            Pain tolerance/management  []            Other:      PLAN :  Recommendations and Planned Interventions:  Recommend purees and thins but she will be very slow to eat and will have a hard time meeting her needs. If there is not significant improvement in her intake she will risk readmission due to dehydration. Frequency/Duration: Patient will be followed by speech-language pathology 2 times a week to address goals. Discharge Recommendations: None     SUBJECTIVE:   Patient was nonverbal.     OBJECTIVE:     Past Medical History:   Diagnosis Date    Alzheimer's dementia     Dementia     Diabetes ([facility] [address]     Hypertension     Stroke ([facility] [address] 2011    TIA     Past Surgical History:   Procedure Laterality Date    HX GYN      hysterectomy     Prior Level of Function/Home Situation:     Diet prior to admission:   Current Diet:  NPO   Cognitive and Communication Status:  Neurologic State: Alert  Orientation Level: Unable to verbalize  Cognition: No command following  Perception: Appears intact  Perseveration: No perseveration noted     Oral Assessment:  Oral Assessment  Labial: No impairment  Dentition: Intact; Natural  Lingual: No impairment  Mandible: No impairment  P.O. Trials:  Patient Position: upright in bed  Vocal quality prior to P.O.: Other (comment)  Consistency Presented: Thin liquid;Puree  How Presented: Self-fed/presented;Straw     Bolus Acceptance: No impairment  Bolus Formation/Control: Impaired  Type of Impairment: Delayed; Posterior  Propulsion: Delayed (# of seconds)        Laryngeal Elevation: Functional  Aspiration Signs/Symptoms: None  Pharyngeal Phase Characteristics: Suspected pharyngeal residue;Double swallow             Oral Phase Severity: Moderate  Pharyngeal Phase Severity : Mild-moderate    NOMS:   The NOMS functional outcome measure was used to quantify this patient's level of swallowing impairment.   Based on the NOMS, the patient was determined to be at level 3 for swallow function       NOMS Swallowing Levels:  Level 1 (CN): NPO  Level 2 (CM): NPO but takes consistency in therapy  Level 3 (CL): Takes less than 50% of nutrition p.o. and continues with nonoral feedings; and/or safe with mod cues; and/or max diet restriction  Level 4 (CK): Safe swallow but needs mod cues; and/or mod diet restriction; and/or still requires some nonoral feeding/supplements  Level 5 (CJ): Safe swallow with min diet restriction; and/or needs min cues  Level 6 (CI): Independent with p.o.; rare cues; usually self cues; may need to avoid some foods or needs extra time  Level 7 ([address] [place] [address]): Independent for all p.o.  ANILA. (2003). National Outcomes Measurement System (NOMS): Adult Speech-Language Pathology User's Guide. Pain:  Pain Scale 1: Adult Nonverbal Pain Scale        After treatment:   []            Patient left in no apparent distress sitting up in chair  [x]            Patient left in no apparent distress in bed  [x]            Call bell left within reach  [x]            Nursing notified  []            Caregiver present  []            Bed alarm activated    COMMUNICATION/EDUCATION:   The patients plan of care including recommendations, planned interventions, and recommended diet changes were discussed with: Registered Nurse. []            Posted safety precautions in patient's room. []            Patient/family have participated as able in goal setting and plan of care. []            Patient/family agree to work toward stated goals and plan of care. []            Patient understands intent and goals of therapy, but is neutral about his/her participation. [x]            Patient is unable to participate in goal setting and plan of care.     Thank you for this referral.  Davis Jefferson SLP  Time Calculation: 12 mins

## 2019-03-15 NOTE — PROGRESS NOTES
PCU SHIFT NURSING NOTE      Bedside shift change report given to Bubba Shaikh RN (oncoming nurse) by Christie Beltrán RN (offgoing nurse). Report included the following information SBAR, Kardex, Procedure Summary and Recent Results. Shift Summary:   0300- Patient resting. Dilt gtt continued, HR stable. SR in 70's. BP WNL will continue to monitor. 0600- titrated dilt gtt, running at 2.5 at this time. HR stable in 70's. Continued diltiazem gtt at low dose since unable to take PO at this time. Scheduled metoprolol administered. 's. Will continue to monitor. Admission Date 3/14/2019   Admission Diagnosis Sepsis ([facility] [address] [A41.9]  UTI (urinary tract infection) [N39.0]  Pneumonia [J18.9]   Consults IP CONSULT TO HOSPITALIST        Consults   []PT   []OT   []Speech   []Case Management      [] Palliative      Cardiac Monitoring Order   []Yes   []No     IV drips   []Yes    Drip:                            Dose:  Drip:                            Dose:  Drip:                            Dose:   []No     GI Prophylaxis   []Yes   []No         DVT Prophylaxis   SCDs:             Ernesto stockings:         [] Medication   []Contraindicated   []None      Activity Level Activity Level: Bed Rest         Purposeful Rounding every 1-2 hour? []Yes   Cha Score  Total Score: 3   Bed Alarm (If score 3 or >)   []Yes   [] Refused (See signed refusal form in chart)   Keegan Score  Keegan Score: 11   Keegan Score (if score 14 or less)   []PMT consult   []Wound Care consult      []Specialty bed   [] Nutrition consult          Needs prior to discharge:   Home O2 required:    []Yes   []No    If yes, how much O2 required?     Other:    Last Bowel Movement: Last Bowel Movement Date: 03/14/19      Influenza Vaccine          Pneumonia Vaccine           Diet Active Orders   Diet    DIET NPO      LDAs               Peripheral IV 03/14/19 Left Forearm (Active)   Site Assessment Clean, dry, & intact 3/15/2019  4:00 AM   Phlebitis Assessment 0 3/15/2019 4:00 AM   Infiltration Assessment 0 3/15/2019  4:00 AM   Dressing Status Clean, dry, & intact 3/15/2019  4:00 AM   Dressing Type Transparent 3/15/2019  4:00 AM   Hub Color/Line Status Infusing 3/15/2019  4:00 AM          External Female Catheter 03/14/19 (Active)   Site Assessment Clean, dry, & intact 3/15/2019 12:46 AM   Repositioned Yes 3/15/2019 12:46 AM   Perineal Care Yes 3/15/2019 12:46 AM   Wick Changed No 3/15/2019 12:46 AM   Suction Canister/Tubing Changed No 3/15/2019 12:46 AM                Urinary Catheter      Intake & Output Date 03/14/19 0700 - 03/15/19 0659 03/15/19 0700 - 03/16/19 0659   Shift 4464-4906 3214-9617 24 Hour Total 3363-6849 5498-1631 24 Hour Total   INTAKE   I.V.(mL/kg/hr) 1000(1.2)  1000        Volume (sodium chloride 0.9 % bolus infusion 1,000 mL) 1000  1000      Shift Total(mL/kg) 1000(14.7)  1000(14.7)      OUTPUT   Urine(mL/kg/hr)           Urine Occurrence(s)  1 x 1 x      Stool           Stool Occurrence(s) 1 x 1 x 2 x      Shift Total(mL/kg)         NET 1000  1000      Weight (kg) 68 68 68 68 68 68         Readmission Risk Assessment Tool Score High Risk            24       Total Score        9 Pt. Coverage (Medicare=5 , Medicaid, or Self-Pay=4)    15 Charlson Comorbidity Score (Age + Comorbid Conditions)        Criteria that do not apply:    Has Seen PCP in Last 6 Months (Yes=3, No=0)    . Living with Significant Other. Assisted Living. LTAC. SNF.  or   Rehab    Patient Length of Stay (>5 days = 3)    IP Visits Last 12 Months (1-3=4, 4=9, >4=11)       Expected Length of Stay - - -   Actual Length of Stay 1

## 2019-03-15 NOTE — WOUND CARE
Wound Care Consult for the right buttocks wound and the sacral wound that were present on admission. Pt. Stays at her son's home and is cared for by several family members. She is bed bound. Assessment: the sacrum has a very dark scarring of the skin where an old pressure injury is healing. Patient favors lying on her right side but staff nurses are turning her every two hours. The right side of the buttocks on the lateral aspect has a pink healing wound (completely epithelial tissue) without drainage. The skin is blanchable. Treatment: Skin Cleansed and zinc oxide cream ([employer]) applied to her skin on the sacrum and the buttocks due to incontinence. Order written for the same.    Rose Schroeder RN, BSN, [employer]

## 2019-03-15 NOTE — PROGRESS NOTES
Problem: Falls - Risk of  Goal: *Absence of Falls  Document Jerrell Fall Risk and appropriate interventions in the flowsheet.   Outcome: Progressing Towards Goal  Fall Risk Interventions:       Mentation Interventions: Bed/chair exit alarm, Increase mobility, Toileting rounds    Medication Interventions: Bed/chair exit alarm    Elimination Interventions: Call light in reach, Bed/chair exit alarm, Toileting schedule/hourly rounds

## 2019-03-15 NOTE — PROGRESS NOTES
RAPID RESPONSE TEAM    Rounded on patient due to MEWS 4. Discussed with primary RN, Merritt Garcia. HR increased >120, patient has received metoprolol 2.5 mg IV and RN has received orders for an additional metoprolol 5 mg IV. No RRT interventions indicated at this time. Please call with any questions or concerns.      Rah Lunsford  Rapid Response RN  Connie Pham

## 2019-03-15 NOTE — PROGRESS NOTES
Hospitalist Progress Note    NAME: Leslie Paredes   :  3/7/1930   MRN:  [id number]       Assessment / Plan:  Sepsis, POA due to  Extensive right sided aspiration pneumonia, POA  UTI, POA  Hypokalemia, POA. Due to vomiting and diarrhea  Sinus tach vs. SVT  Fecal stasis in rectum, POA. Having diarrhea around fecal stasis. HTN with blood pressure trending down  Dementia  DM  Decubitus buttock ulcer, POA and Sacral redness, POA per ER nurse     --She has been having n/v at home leading to aspiration so will not continue zosyn. Put on levaquin and flagyl. --nebs prn  --on diltiazem gtt for aflutter and cards to see. --wound care consult     Fecal impaction - try dulcolax suppository   Son apparently wants full code. On dysphagia puree per speech.     Body mass index is 24.21 kg/m².     Code: full  DVT prophylaxis: lovenox  Surrogate decision maker:  Sindhu Dsouza [phone]                Subjective:     Chief Complaint / Reason for Physician Visit  \"nonverbal\". Discussed with RN events overnight. Review of Systems:  Symptom Y/N Comments  Symptom Y/N Comments   Fever/Chills    Chest Pain     Poor Appetite    Edema     Cough    Abdominal Pain     Sputum    Joint Pain     SOB/COLEMAN    Pruritis/Rash     Nausea/vomit    Tolerating PT/OT     Diarrhea    Tolerating Diet     Constipation    Other       Could NOT obtain due to:      Objective:     VITALS:   Last 24hrs VS reviewed since prior progress note.  Most recent are:  Patient Vitals for the past 24 hrs:   Temp Pulse Resp BP SpO2   03/15/19 1100 98.3 °F (36.8 °C) 73 22 167/63 96 %   03/15/19 0911  64 28 150/66 96 %   03/15/19 0800 98.2 °F (36.8 °C) 68 22 162/69    03/15/19 0624  79  173/71    03/15/19 0400 98.3 °F (36.8 °C) 74 20 153/58 98 %   03/15/19 0330  75 26 168/84 96 %   03/15/19 0300  77 26 156/76 93 %   03/15/19 0231  75 23 170/71 96 %   03/15/19 0207  85 18  93 %   03/15/19 0204  100 25 133/76 99 %   03/15/19 0130  96 23 131/88  03/15/19 0123  97 17 145/80    03/15/19 0122  93 11  98 %   03/15/19 0031 98.8 °F (37.1 °C) (!) 126 21 (!) 147/93 96 %   03/15/19 0030  (!) 126  (!) 147/93    03/14/19 2319 99.3 °F (37.4 °C) (!) 127 24 (!) 146/99 97 %   03/14/19 2034 97.9 °F (36.6 °C) 85 28 (!) 131/99 100 %   03/14/19 1838     98 %   03/14/19 1800  88  145/78    03/14/19 1500  84 22 150/88 95 %   03/14/19 1332  84 24 123/53 97 %   03/14/19 1330  82 21 162/72 96 %   03/14/19 1300  79 21 140/72 97 %   03/14/19 1230  (!) 124 19 117/89    03/14/19 1200  (!) 123 22 136/75 96 %       Intake/Output Summary (Last 24 hours) at 3/15/2019 1156  Last data filed at 3/15/2019 0903  Gross per 24 hour   Intake 518.58 ml   Output    Net 518.58 ml        PHYSICAL EXAM:  General: Non verbal  EENT:  EOMI. Anicteric sclerae. MMM  Resp:  CTA bilaterally, no wheezing or rales. No accessory muscle use  CV:  Regular  rhythm,  No edema  GI:  Soft, Non distended, Non tender.  +Bowel sounds    Skin:  No rashes. No jaundice    Reviewed most current lab test results and cultures  YES  Reviewed most current radiology test results   YES  Review and summation of old records today    NO  Reviewed patient's current orders and MAR    YES  PMH/ reviewed - no change compared to H&P  ________________________________________________________________________  Care Plan discussed with:    Comments   Patient     Family      RN     Care Manager     Consultant                        Multidiciplinary team rounds were held today with , nursing, pharmacist and clinical coordinator. Patient's plan of care was discussed; medications were reviewed and discharge planning was addressed.      ________________________________________________________________________  Total NON critical care TIME:  30   Minutes    Total CRITICAL CARE TIME Spent:   Minutes non procedure based      Comments   >50% of visit spent in counseling and coordination of care ________________________________________________________________________  Pablito Rodrigues DO     Procedures: see electronic medical records for all procedures/Xrays and details which were not copied into this note but were reviewed prior to creation of Plan. LABS:  I reviewed today's most current labs and imaging studies.   Pertinent labs include:  Recent Labs     03/15/19  0129 03/14/19  0901   WBC 12.7* 15.0*   HGB 11.1* 12.6   HCT 33.3* 36.7    243     Recent Labs     03/15/19  0129 03/14/19  0940    141   K 3.0* 3.0*   * 106   CO2 24 28   GLU 86 107*   BUN 15 25*   CREA 0.54* 0.81   CA 8.0* 8.6   MG 1.8  --    PHOS 1.2*  --    ALB  --  2.5*   TBILI  --  2.2*   SGOT  --  16   ALT  --  10*       Signed: Pablito Rodrigues DO

## 2019-03-15 NOTE — CONSULTS
[address] [facility]     Date of  Admission: 3/14/2019  8:15 AM     Admission type:Emergency    Consult for: medication management  Consult by: hospitalist      Subjective:     Tobias Mcclain is a 80 y.o. female with PMH HTN, DM, TIA, dementia who was admitted for Sepsis ([address] [A41.9]  UTI (urinary tract infection) [N39.0]  Pneumonia [J18.9]. Per ED provider note, Ms. Emma Byrd presented with family via EMS with c/o fever, lethargy, vomiting, and diarrhea. She was admitted with UTI, sepsis, and PNA. Cardiology consulted after patient developed a fib and possible flutter with RVR. History obtained from chart and patient's daughter-in-law due to patient's severe dementia. On exam, patient not following commands. It appears she may have shook her head no to question about pain. Not able to perform full ROS. Ms. Emma Byrd does not follow with a cardiologist.  Patient's family deny history of arrhythmia or MI. No testing in our system.           Patient Active Problem List    Diagnosis Date Noted    TIA (transient ischemic attack) 08/16/2011     Priority: 1 - One    Dementia 08/16/2011     Priority: 1 - One    DM (diabetes mellitus) ([address] 08/16/2011     Priority: 1 - One    HBP (high blood pressure) 08/16/2011     Priority: 2 - Two    ICH (intracerebral hemorrhage) ([address] 08/16/2011     Priority: 2 - Two    Hyperlipemia 08/16/2011     Priority: 2 - Two    Paroxysmal atrial fibrillation ([address] 03/15/2019    Pneumonia 03/14/2019    Sepsis ([address] 03/14/2019    Delirium 03/15/2015    Hypoglycemia associated with diabetes ([address] 03/12/2015    UTI (urinary tract infection) 03/12/2015      Walter Ocampo MD  Past Medical History:   Diagnosis Date    Alzheimer's dementia     Dementia     Diabetes ([address]     Hypertension     Stroke ([address] 2011    TIA      Social History     Socioeconomic History  Marital status: SINGLE     Spouse name: Not on file    Number of children: Not on file    Years of education: Not on file    Highest education level: Not on file   Tobacco Use    Smoking status: Never Smoker    Smokeless tobacco: Never Used   Substance and Sexual Activity    Alcohol use: No    Drug use: No     Allergies   Allergen Reactions    Aspirin Other (comments)     Daugher states no allergy    Pcn [Penicillins] Nausea and Vomiting      History reviewed. No pertinent family history. Current Facility-Administered Medications   Medication Dose Route Frequency    dilTIAZem (CARDIZEM) 125 mg in dextrose 5% 125 mL infusion  0-15 mg/hr IntraVENous CONTINUOUS    levoFLOXacin (LEVAQUIN) 500 mg in D5W IVPB  500 mg IntraVENous Q24H    [START ON 3/16/2019] enoxaparin (LOVENOX) injection 25 mg  25 mg SubCUTAneous DAILY    0.9% sodium chloride with KCl 20 mEq/L infusion   IntraVENous CONTINUOUS    potassium, sodium phosphates (NEUTRA-PHOS) packet 2 Packet  2 Packet Oral QID    sodium chloride (NS) flush 5-10 mL  5-10 mL IntraVENous PRN    metroNIDAZOLE (FLAGYL) IVPB premix 500 mg  500 mg IntraVENous Q12H    levalbuterol (XOPENEX) nebulizer soln 0.63 mg/3 mL  0.63 mg Nebulization Q6H PRN    ipratropium (ATROVENT) 0.02 % nebulizer solution 0.5 mg  0.5 mg Nebulization Q6H PRN    metoprolol (LOPRESSOR) injection 2.5 mg  2.5 mg IntraVENous Q6H        Review of Symptoms:   Unable to perform due to severe dementia         Objective:      Visit Vitals  /52   Pulse 60   Temp 98.3 °F (36.8 °C)   Resp 26   Ht 5' 6\" (1.676 m)   Wt 45.7 kg (100 lb 12 oz)   SpO2 99%   BMI 16.26 kg/m²       Physical:   General: calm, elderly AAF resting in bed in NAD  Heart: BOI,5/3 systolic murmur  Lungs: clear, dim bases   Abdomen: Soft, +BS, NTND    Extremities: LE viji +DP/PT, no edema   Neurologic: awake. Looks at you, but does not follow commands.   Non-verbal    Data Review:   Recent Labs     03/15/19  7573 03/14/19  0901   WBC 12.7* 15.0*   HGB 11.1* 12.6   HCT 33.3* 36.7    243     Recent Labs     03/15/19  0129 03/14/19  0940    141   K 3.0* 3.0*   * 106   CO2 24 28   GLU 86 107*   BUN 15 25*   CREA 0.54* 0.81   CA 8.0* 8.6   MG 1.8  --    PHOS 1.2*  --    ALB  --  2.5*   TBILI  --  2.2*   SGOT  --  16   ALT  --  10*       Recent Labs     03/15/19  0129 03/14/19  1633 03/14/19  0940   TROIQ <0.05 <0.05 <0.05   CPK  --  50 47   CKMB  --  1.9 1.3         Intake/Output Summary (Last 24 hours) at 3/15/2019 1433  Last data filed at 3/15/2019 1240  Gross per 24 hour   Intake 638.58 ml   Output    Net 638.58 ml        Cardiographics    Telemetry: SR to SB with PACs and rare PVC; Overnight had a fib and ? Aflutter with RVR  ECG: a fib           Read as 2:1 flutter  Echocardiogram: ordered  CXRAY: \"Diffuse airspace disease throughout the right lung consistent with pneumonia. Follow-up to resolution is suggested. \"       Assessment:       Active Problems:    Dementia (8/16/2011)      UTI (urinary tract infection) (3/12/2015)      Pneumonia (3/14/2019)      Sepsis ([facility] (3/14/2019)      Paroxysmal atrial fibrillation ([facility] (3/15/2019)         Plan:     Lina Granda is a 80 y.o. female who was admitted with sepsis/PNA/UTI. Cardiology consulted after the patient developed a fib/flutter with RVR. She has since converted back to a sinus rhythm. Troponin negative. K 3.0. CHADSVASC=6  · Low potassium likely due to diarrhea and vomiting. K repletion already ordered. Continue to monitor  · Patient has been seen by SLP and can take puree. Will switch IV BB and cardizem to PO. Will not restart home enalapril. · High CHADSVASC with a 9.8% stroke event rate at 1 year. I discussed anticoagulation with patient's son and daughter-in-law and they wish to pursue anticoagulation to help prevent stroke. They deny bleeding history or fall risk (patient does not try to move on her own). Will order eliquis. · ECHO ordered   · PNA/sepsis/UTI per hospitalist      Thank you for consulting [facility]    Koffi Mendoza NP  DNP, RN, [facility]      Patient seen and examined by me with nurse practitioner. Rosette Kin personally performed all components of the history, physical, and medical decision making and agree with the assessment and plan with minor modifications as noted.     Dima Bustos MD

## 2019-03-15 NOTE — CONSULTS
PALLIATIVE MEDICINE    Consult received, pt was admitted yesterday, will see patient on Monday. Thank you for including Palliative Medicine in this patient's care.    Leidy Croft, EUGENIE-C

## 2019-03-16 NOTE — PROGRESS NOTES
Pharmacy Automatic Renal Dosing Protocol - Antimicrobials    Indication for Antimicrobials: VAP    Current Regimen of Each Antimicrobial:  Cefepime 1gm IV q8h - started 3/16 day 1  Metronidazole 500mg IV q12h - started 3/16 day 1  Vancomycin 750 mg IV Q16H -start 3/16/19 - day 1    Previous abx:   Zosyn 3.375 g x 1 in ED  Levofloxacin 750 mg every 48 H; Started 3/14/19, Day #2  Metronidazole 500 mg Q8H; Started 3/14/19, Day #2  Zosyn 3.375 g IV Q8H (Start 3/16/19 - Day 1)    Significant Cultures:   3/16 Rx sputum from endotracheal tip - ng pending  3/`4 UCx - GNR - pending  3/14 PBCx - ng pending    Radiology / Imaging results: (X-ray, CT scan or MRI):  IMPRESSION:  1. Diffuse airspace disease throughout the right lung consistent with pneumonia. Labs:  Recent Labs     19  0817 19  0622 03/15/19  0129 19  0940 19  0901   CREA  --  0.40* 0.54* 0.81  --    BUN  --  9 15 25*  --    WBC 14.1*  --  12.7*  --  15.0*     Temp (24hrs), Av.9 °F (37.2 °C), Min:97.9 °F (36.6 °C), Max:99.5 °F (37.5 °C)    Creatinine Clearance:  39mL/min rounding SCr to 0.7    Impression/Plan:   · Afebrile, leukocytosis, SCr stable  · Give pt wt of 45kg, will continue Cefepime 1gm IV q8 rather than change to 2gm regimen  · Calculated loading dose would be 1000mg  (940mg rounded). Checked Vancomycin 2000mg/500mL infusion fro 11am hang time in patient room and 150mL left remaining to be infused, thus asked nurse to stop Vancomycin loading dose infusion. Therefore Vancomycin 1400mg dose was delivered. · Adjusted Vancomycin to 750mg IV q18h for a projected trough at Css of 16.3 (q16h interval projected 19.5 trough in 79yo female).   Considering 1400mg initial dose, will give next dose approximately 26-27 hours from 3/16 11am start time = 3/17 14:00  · Continue Metronidazole regimen  · Will need to order Vancomycin trough (**note adjusted loading dose = 1400mg**)  · Antimicrobial stop date: pending     Pharmacy will follow daily and adjust medications as appropriate for renal function and/or serum levels.     Thank you,  Bernard Cisneros, [facility]

## 2019-03-16 NOTE — PROGRESS NOTES
1910 Bedside and Verbal shift change report given to Chrissy (oncoming nurse) by Danielle Grayson  (offgoing nurse). Report included the following information SBAR, Intake/Output, MAR, Recent Results and Cardiac Rhythm SB.   2000 full assessment as charted. SB low 49. Monitor closely. 12 am  No changes with assessment. 4 am liquid stool x 2. Placed Flexiseal as order. 45 ml water placed. No resistance noted on placement. Tolerated am bath. 530 am hold sedation for am SAT. 6 am passed SBT . Resumed sedation and  Previous rate AC 16 due to biting tube and does not follow command. 9053 Dr Harrison Baker called and order for consult to Gen Surg for Chest tube. Right pneumothorax noted on am CXR. Made aware of electrolytes. K 3.2 and Magesium 2.0. No orders made.   8014 bedside shift report given to [place]

## 2019-03-16 NOTE — CONSULTS
PULMONARY ASSOCIATES [facility] INTENSIVIST Consult Service Note  Pulmonary, Critical Care, and Sleep Medicine    Name: Jessie Burroughs MRN: [id number]   : 3/7/1930 Hospital: [address]   Date: 3/16/2019  Admission date: 3/14/2019 Hospital Day: 3       Subjective/Interval History:   Seen earlier today on rounds. Pt is unstable and acutely ill in the CCU. Patient was re-evaluated multiple times with repeated discussions with CCU team throughout the day        IMPRESSION:   1. Acute Respiratory Failure with Hypoxia,   2. Severe Sepsis with Shock POA due to  3. Extensive right sided aspiration pneumonia, POA  4. UTI, POA  5. Hypokalemia, POA. Due to vomiting and diarrhea  6. Afib A flutter with RVR  7. Fecal stasis in rectum, POA. Having diarrhea around fecal stasis. 8. HTN   9. Dementia  10. H/o CVA per son  6. DM  12. Wheelchair bound  13. Decubitus buttock ulcer, POA and Sacral redness, POA per ER nurse  14. Body mass index is 16.14 kg/m². 15. Additional workup outlined below  16. Multiorgan dysfunction as outlined above: Pt has one or more acute or chronic illnesses with severe exacerbation with progression or side effects of treatment that poses a threat to life or bodily function  17. Pt is unstable, unpredictable needing more CCU monitoring; at high risk of sudden decline and decompensation with life threatening consequenses and continued end organ dysfunction and failure  18. Pt is critically ill. Time spent with pt and staff actively rendering care, managing pt and coordinating care as stated below;  45 minutes, exclusive of any procedures      RECOMMENDATIONS/PLAN:   1. CCU monitoring  2. No CPR, No defib  3. Ventilator for mechanical life support and prevent respiratory arrest with protective lung strategies  4. Daily AM SAT,SBT   5. Agree with Empiric IV antibiotics pending culture results   6. Follow culture results  7. cardiology following  8.  IV vasopressors for circulatory Pt reminded to have labs drawn before next appt. Moderate blood in urine. shock refractory to fluids to maintain SBP> 90  9. CXR in AM while on vent  10. Transfuse prn to maintain Hgb > 7  11. Glucose monitoring and SSI  12. Replete electrolytes  13. Labs to follow electrolytes, renal function and and blood counts  14. Bronchial hygiene with respiratory therapy techniques, bronchodilators  15. Pt needs IV fluids with additives and Drug therapy requiring intensive monitoring for toxicity  16. Prescription drug management with home med reconciliation reviewed  17. DVT, SUP prophylaxis  18. Will be available to assist in medical management while in the CCU pending disposition         Subjective/Initial History:   I have reviewed the flowsheet and previous days notes. Seen earlier today on rounds. I was asked by Andrew Walter MD to see Tobias Mcclain a 80 y.o.  female  in consultation for a chief complaint of respiratory failure, septic shock    The patient is unable to give any meaningful history or review of systems due to patient factors. Excerpts from admission 3/14/2019 and consult notes reviewed as follows:     Willard Fernandez, 80 y.o. female with PMHx significant for dementia and borderline diabetes, presents to EMS to the ED with cc of approximately 2-3 days of decreasing p.o. intake, vomiting, and diarrhea, with suspected abdominal pain. Family notes patient has been bent over apparently holding her abdomen due to pain. She is nonverbal at baseline due to history of dementia. Family has had difficulty getting patient to take any p.o. intake. They are not aware of any fever at home. They report some coughing, dry. No recent sick contacts. Patient is incontinent at baseline. \"     Jacob Porfirio Brittney is a 80 y.o. female who was admitted with sepsis/PNA/UTI. Cardiology consulted after the patient developed a fib/flutter with RVR. She has since converted back to a sinus rhythm. Troponin negative. K 3.0.   CHADSVASC=6 \"     Pt now in CCU and unresponsive on vent. Spoke with sons Natalia Reid and Dr. Julisa Kiran at bedside. Pt has long h/o multiinfarct dementia. Also has daughter Natalia Meyer, currently not here. Had CVA many yrs ago that caused dementia which has been stable since CVA. Slowing down for many months. Excellent care by family. Lives with Papa's family. Was living with Angel( mPOA). Management discussed with sons and they understand how ill and unstable pt has become. They wish that we continue efforts to help pt but if she should her heart stop, we treat her with respect but not pursue cardiac resuscitation. No CPR. No defib. We will keep them informed if she decompensates more. Will see how she does over next few days and hope she rallies. Patient PCP: Sarah Lopes MD  PMH:  has a past medical history of Alzheimer's dementia, Dementia, Diabetes ([facility] [address] Hypertension, and Stroke ([facility] [address] (2011). PSH:   has a past surgical history that includes hx gyn. FHX: family history is not on file. SHX:  reports that  has never smoked. she has never used smokeless tobacco. She reports that she does not drink alcohol or use drugs. ROS:Review of systems not obtained due to patient factors.     Allergies   Allergen Reactions    Aspirin Other (comments)     Ronugher states no allergy    Pcn [Penicillins] Nausea and Vomiting      MEDS:   Current Facility-Administered Medications   Medication    NOREPINephrine (LEVOPHED) 8 mg in 5% dextrose 250mL infusion    propofol (DIPRIVAN) 10 mg/mL injection    cefepime (MAXIPIME) 1 g in 0.9% sodium chloride (MBP/ADV) 50 mL    metroNIDAZOLE (FLAGYL) IVPB premix 500 mg    heparin (porcine) pf 300 Units    bacitracin 500 unit/gram packet 1 Packet    propofol (DIPRIVAN) 10 mg/mL injection    [START ON 3/17/2019] vancomycin (VANCOCIN) 750 mg in 0.9% sodium chloride (MBP/ADV) 250 mL    0.9% sodium chloride with KCl 20 mEq/L infusion    potassium, sodium phosphates (NEUTRA-PHOS) packet 2 Packet    dilTIAZem (CARDIZEM) IR tablet 30 mg    apixaban (ELIQUIS) tablet 2.5 mg    metoprolol tartrate (LOPRESSOR) tablet 12.5 mg    hydrALAZINE (APRESOLINE) 20 mg/mL injection 10 mg    acetaminophen (TYLENOL) tablet 650 mg    sodium chloride (NS) flush 5-10 mL    levalbuterol (XOPENEX) nebulizer soln 0.63 mg/3 mL    ipratropium (ATROVENT) 0.02 % nebulizer solution 0.5 mg        Current Facility-Administered Medications:     NOREPINephrine (LEVOPHED) 8 mg in 5% dextrose 250mL infusion, 2-30 mcg/min, IntraVENous, TITRATE, Yostos, Hayden B, DO, Stopped at 03/16/19 0800    propofol (DIPRIVAN) 10 mg/mL injection, 0-50 mcg/kg/min, IntraVENous, TITRATE, [place] Carbine, DO, Last Rate: 12.3 mL/hr at 03/16/19 1251, 45 mcg/kg/min at 03/16/19 1251    cefepime (MAXIPIME) 1 g in 0.9% sodium chloride (MBP/ADV) 50 mL, 1 g, IntraVENous, Q8H, Yostos, Hayden B, DO, Last Rate: 100 mL/hr at 03/16/19 1100, 1 g at 03/16/19 1100    metroNIDAZOLE (FLAGYL) IVPB premix 500 mg, 500 mg, IntraVENous, Q12H, Yostos, Hayden B, DO, Last Rate: 100 mL/hr at 03/16/19 0952, 500 mg at 03/16/19 0952    heparin (porcine) pf 300 Units, 300 Units, InterCATHeter, PRN, Yostos, Hayden B, DO    bacitracin 500 unit/gram packet 1 Packet, 1 Packet, Topical, PRN, Yostos, Hayden B, DO    propofol (DIPRIVAN) 10 mg/mL injection, , , ,     [START ON 3/17/2019] vancomycin (VANCOCIN) 750 mg in 0.9% sodium chloride (MBP/ADV) 250 mL, 750 mg, IntraVENous, Q18H, Yostos, Hayden B, DO    0.9% sodium chloride with KCl 20 mEq/L infusion, , IntraVENous, CONTINUOUS, Yostos, Hayden B, DO, Last Rate: 100 mL/hr at 03/16/19 1114    potassium, sodium phosphates (NEUTRA-PHOS) packet 2 Packet, 2 Packet, Oral, QID, Kaz Page, NP, 2 Packet at 03/16/19 1251    dilTIAZem (CARDIZEM) IR tablet 30 mg, 30 mg, Oral, AC&HS, Alexus Victoria NP, Stopped at 03/16/19 1130    apixaban (ELIQUIS) tablet 2.5 mg, 2.5 mg, Oral, BID, Alexus Victoria NP, 2.5 mg at 03/16/19 1010   metoprolol tartrate (LOPRESSOR) tablet 12.5 mg, 12.5 mg, Oral, BID, Alexus Victoria NP, Stopped at 19 1010    hydrALAZINE (APRESOLINE) 20 mg/mL injection 10 mg, 10 mg, IntraVENous, Q6H PRN, Alexus Lopez NP, 10 mg at 19 1251    acetaminophen (TYLENOL) tablet 650 mg, 650 mg, Oral, Q6H PRN, Brannon Sanchez MD, 650 mg at 03/15/19 1735    sodium chloride (NS) flush 5-10 mL, 5-10 mL, IntraVENous, PRN, Trav Walker MD    levalbuterol Gelnn Cartagena) nebulizer soln 0.63 mg/3 mL, 0.63 mg, Nebulization, Q6H PRN, Brannon Sanchez MD    ipratropium (ATROVENT) 0.02 % nebulizer solution 0.5 mg, 0.5 mg, Nebulization, Q6H PRN, Brannon Sanchez MD      Objective:     Vital Signs: Telemetry:    AFIB Intake/Output:   Visit Vitals  /43   Pulse (!) 59   Temp 98.8 °F (37.1 °C)   Resp 16   Ht 5' 6\" (1.676 m)   Wt 45.4 kg (100 lb)   SpO2 97%   BMI 16.14 kg/m²       Temp (24hrs), Av.9 °F (37.2 °C), Min:97.9 °F (36.6 °C), Max:99.5 °F (37.5 °C)        O2 Device: Room air           Body mass index is 16.14 kg/m². Wt Readings from Last 4 Encounters:   19 45.4 kg (100 lb)   10/26/18 68 kg (150 lb)   18 68 kg (150 lb)   11 68 kg (150 lb)          Intake/Output Summary (Last 24 hours) at 3/16/2019 1403  Last data filed at 3/16/2019 1325  Gross per 24 hour   Intake 4437.13 ml   Output 1110 ml   Net 3327.13 ml       Last shift:       0701 -  1900  In: 4377.1 [I.V.:3447.1]  Out: 210 [Urine:210]  Last 3 shifts: 1901 -  0700  In: 698.6 [P.O.:180; I.V.:518.6]  Out: 900 [Urine:900]       Hemodynamics:    CO:    CI:    CVP:    SVR:   PAP Systolic:    PAP Diastolic:    PVR:    OF73:        Ventilator Settings:      Mode Rate TV Press PEEP FiO2 PIP Min. Vent   Assist control    400 ml    6 cm H20 40 %  22 cm H2O  6.24 l/min      Physical Exam:     General:  female; appears dehydrated and severely ill;  on vent;    HEAD: atraumatic   EYES: conjunctivae clear. PERRL, AN Icteric sclerae   NOSE: nares normal, no drainage, no nasal flaring,    THROAT: intubated; mucous membranes dry; Lips, mucosa dry;      Neck: Supple, symmetrical, trachea midline,  No accessory mm use; No Stridor/ cuff leak, No goiter or thyroid tenderness   LYMPH: No abnormally enlarged lymph nodes. in neck    Chest: normal   Lungs: rhonchi   Heart: IRRegular rate and rhythm; NO edema   Abdomen: soft, nontender   : krueger; clear urine; NO gross hematuria   Extremity: negative, clubbing; no joint swelling or erythema   Neuro: sedated; non verbal; withdraws to pain; unable to check gait and station; NOT following simple commands   Psych: Unable to assess; No agitation;    Devices:per sepsis flow sheet- reviewed with team during IDR   Skin: Warm;    Pulses:Bilateral, Radial, 1+   Capillary refill: abnormal: ; sluggish capillary refill,      Labs:    Recent Labs     03/16/19  0817 03/15/19  0129 03/14/19  0901   WBC 14.1* 12.7* 15.0*   HGB 10.3* 11.1* 12.6    159 243     Recent Labs     03/16/19  0817 03/16/19  0622 03/15/19  0129 03/14/19  0940   NA  --  142 143 141   K  --  2.7* 3.0* 3.0*   CL  --  112* 111* 106   CO2  --  19* 24 28   GLU  --  89 86 107*   BUN  --  9 15 25*   CREA  --  0.40* 0.54* 0.81   CA  --  8.1* 8.0* 8.6   MG 2.7* 1.7 1.8  --    PHOS  --   --  1.2*  --    ALB  --   --   --  2.5*   SGOT  --   --   --  16   ALT  --   --   --  10*   LPSE  --   --   --  25*     No results for input(s): PH, PCO2, PO2, HCO3, FIO2 in the last 72 hours.   Recent Labs     03/16/19  0817 03/16/19  0622 03/15/19  0129 03/14/19  1633 03/14/19  0940   CPK 95  --   --  50 47   CKNDX 3.3*  --   --  3.8* 2.8*   TROIQ 0.29* 0.18* <0.05 <0.05 <0.05     No results found for: BNPP, BNP   Lab Results   Component Value Date/Time    Culture result: KLEBSIELLA PNEUMONIAE SENSITIVITY TO FOLLOW (A) 03/14/2019 09:01 AM    Culture result: NO GROWTH 2 DAYS 03/14/2019 08:40 AM    Culture result: KLEBSIELLA PNEUMONIAE (A) 10/26/2018 04:18 PM      Lab Results   Component Value Date/Time    CK 95 03/16/2019 08:17 AM    CK 50 03/14/2019 04:33 PM       Imaging:  I have personally reviewed the patients radiographs and have reviewed the reports:    CXR Results  (Last 48 hours)               03/16/19 0739  XR CHEST PORT Final result    Impression:  Impression: No change in lung fields following ET tube placement. Narrative: Indication: Status post code, intubation       Comparison to 3/14/2019. Portable exam obtained at 719 demonstrates placement of   an ET tube, projecting in satisfactory position. Infiltrate involving the entire   right lung is unchanged. Results from [place] encounter on 03/14/19   XR ABD (KUB)    Narrative CLINICAL HISTORY: NG tube placement    Comparison to 10/26/2018    Single KUB demonstrates a normal bowel gas pattern. NG tube has been placed with  the tip projecting over the antrum/pyloric region. Impression IMPRESSION: NG tube as above. Normal bowel gas pattern. XR CHEST PORT    Narrative Indication: Status post code, intubation    Comparison to 3/14/2019. Portable exam obtained at 719 demonstrates placement of  an ET tube, projecting in satisfactory position. Infiltrate involving the entire  right lung is unchanged. Impression Impression: No change in lung fields following ET tube placement. XR CHEST PORT    Narrative EXAM: XR CHEST PORT    INDICATION: sepsis    COMPARISON: 10/26/2018    FINDINGS: A portable AP radiograph of the chest was obtained at 0921 hours. The  patient is on a cardiac monitor. There is diffuse airspace disease throughout the right lung consistent with  pneumonia. Follow-up to resolution is suggested. Left lung is clear. There is mild cardiomegaly. Impression IMPRESSION:  1. Diffuse airspace disease throughout the right lung consistent with pneumonia. Follow-up to resolution is suggested.      Results from [facility] [place] Encounter encounter on 03/14/19   CT ABD PELV W CONT    Narrative EXAM: CT ABD PELV W CONT    INDICATION: Abdominal pain; abd pain, fever, vomiting, diarrhea, AMS    COMPARISON:    CONTRAST: 100 mL of Isovue-370. TECHNIQUE:   Following the uneventful intravenous administration of contrast, thin axial  images were obtained through the abdomen and pelvis. Coronal and sagittal  reconstructions were generated. Oral contrast was not administered. CT dose  reduction was achieved through use of a standardized protocol tailored for this  examination and automatic exposure control for dose modulation. FINDINGS:   LUNG BASES: There is airspace disease in the right lung consistent with  pneumonia. INCIDENTALLY IMAGED HEART AND MEDIASTINUM: There is cardiomegaly  LIVER: No mass or biliary dilatation. GALLBLADDER: Unremarkable. SPLEEN: No mass. PANCREAS: No mass or ductal dilatation. There is a 7 mm cyst in the pancreatic  neck  ADRENALS: Unremarkable. KIDNEYS: No mass, calculus, or hydronephrosis. STOMACH: Unremarkable. There is a right renal cyst  SMALL BOWEL: No dilatation or wall thickening. COLON: There is fecal stasis in the rectum  APPENDIX: Not distended  PERITONEUM: No ascites or pneumoperitoneum. RETROPERITONEUM: No lymphadenopathy or aortic aneurysm. REPRODUCTIVE ORGANS: Patient status post hysterectomy  URINARY BLADDER: No mass or calculus. BONES: No destructive bone lesion. There is dextroconvex scoliosis  ADDITIONAL COMMENTS: There is a ventral wall hernia containing fat      Impression IMPRESSION:    1. There is fecal stasis in the rectum. 2. There is ventral wall hernia containing fat. During this entire length of time the patient's condition was unstable, unpredictable and critically ill in the CCU/ ICU.  I was immediately available to the patient whose care required several interactions with nursing, multidisciplinary team members leading to multiple interventions with fluid resuscitation and medication adjustments to optimize respiratory support, hemodynamic treatment, medication changes based on repeat labs results, reviews, exams and assessments. The reason for providing this level of medical care was due to a critical illness that impaired one or more vital organ systems, such that there was a high probability of sudden or life threatening deterioration in the patient's condition. This care involved high complexity medical decision making to treat acute and unstable vital organ system failure, and to prevent further life threatening deterioration of the patients condition. I personally:  · Reviewed the flowsheet and previous days notes  · Reviewed and summarized records or history from previous days note or discussions with staff, family  · Parenteral controlled substances - Reviewed/ Adjusted / [place] / Started  · High Risk Drug therapy requiring intensive monitoring for toxicity: eg steroids, pressors, antibiotics  · Reviewed and/or ordered Clinical lab tests  · Reviewed and/or ordered Radiology tests  · Reviewed and/or ordered of Medicine tests  · Independently visualized radiologic Images  · Reviewed the patients ECG / Telemetry  · Reviewed and/or adjusted Ventilator / NiPPV settings  ·  discussed my assessment/management with : Consultants, Nursing, Respiratory Therapy, Family for coordination of care           Thank you for allowing us to participate in the care of this patient. We will follow along with you until they no longer require CCU services.     Calvin Shearer MD

## 2019-03-16 NOTE — ROUTINE PROCESS
0700-Bedside shift change report given to Haily Mahajan (oncoming nurse) by Attila Chopra (offgoing nurse). Report included the following information SBAR, Kardex and MAR.   0715- CHG bath completed. Patient bathed. Primary Nurse Marily Hoskins, XOCHITL and Tamiko Nolasco RN performed a dual skin assessment on this patient Impairment noted- see wound doc flow sheet. Patient has bilateral ischeal scars. Patient has a partial thickness wound on her sacrum. 0- Spoke with Dr. Julisa Kiran regarding cardiac arrest.  Orders received. 0725- EKG completed. 9885- Assessment completed. Neuro: Patient opens eyes to voice; does not follow commands (patient did not follow commands per previous assessment; hx of dementia); moves all extremities spontaneously; pupils 2 mm equal and reactive to light. Resp: Patient is ventilated through a 7.5 cm ETT secured at 22 cm at the lip; lung sounds coarse; suctioning a moderate amount of yellow/tan secretions from ETT; Vent settings: A/C 16; ; peep 6; fio2 100%. Cardiac: Monitor displays a fib; no heart murmur heard; pulses palpable in all extremeties; no edema. GI: Abdomen soft; hypoactive bowel sounds present. : Patient has not voided yet, reported to be incontinent. Skin: Patient's sacral metiplex foam dressing is dry and intact. IV: Patient has a #20 in the left arm with propofol infusing at 10 mcg/kg/min. 1410- Stat portable chest x-ray done. 0804- ABG drawn by respiratory. 0815- Fio2 adjusted by respiratory. 1063- Two IV's placed. Labs drawn. 0830- OGT placed. 7726- KUB x-ray taken. Green placed. Patient converted to NSR.  4076- Dr Kodi Okeefe and Dr. Julisa Kiran talking with the patient's sons at the bedside. They were updated on the patient's status and plan of care. 7114- Dr. Veronique Kidd updated on the patient's cardiac arrest via telephone. No new orders. He will see the patient later today. 2624- PICC team at bedside. 7334- PICC line placed and ok for use.   IV tubing changed. 1030- Sputum culture sent. 1114- Reassessed. Patient has a krueger and OGT now. No other changes to AM assessment. 1245- Echo tech at bedside completing echo. 1251- Hydralazine given for elevated BP.   1350- Labs drawn and sent. Urine culture results relayed to Dr. Usha Bautista. 1405- Reassessed. No changes. 1720- Updated patient's sister via telephone regarding patient's status and plan of care. 150 Barron Goyal attending regarding decreased urine output. Orders received. 1830- BMP drawn and sent. 1900- Report given to oncoming shift.

## 2019-03-16 NOTE — PROGRESS NOTES
Pharmacy Automatic Renal Dosing Protocol - Antimicrobials    Indication for Antimicrobials: VAP    Current Regimen of Each Antimicrobial:  Zosyn 3.375 g IV Q8H (Start 3/16/19 - Day 1)  Vancomycin 1000 mg x 1, followed by 750 mg IV Q16H (Start 3/16/19 - Day 1)    Previous abx:   Zosyn 3.375 g x 1 in ED  Levofloxacin 750 mg every 48 H; Started 3/14/19, Day #2  Metronidazole 500 mg Q8H; Started 3/14/19, Day #2  Significant Cultures:   3/14/19 Blood and urine cx sent    Radiology / Imaging results: (X-ray, CT scan or MRI):  IMPRESSION:  1. Diffuse airspace disease throughout the right lung consistent with pneumonia. Labs:  Recent Labs     19  0622 03/15/19  0129 19  0940 19  0901   CREA 0.40* 0.54* 0.81  --    BUN 9 15 25*  --    WBC  --  12.7*  --  15.0*     Temp (24hrs), Av.9 °F (37.2 °C), Min:98.3 °F (36.8 °C), Max:99.5 °F (37.5 °C)    Creatinine Clearance: 69 mL/min    Impression/Plan:   · Abx changed to Vancomycin and Zosyn. Vancomycin dosed for a predicted trough of 15.65 mcg/ml. · Antimicrobial stop date: pending     Pharmacy will follow daily and adjust medications as appropriate for renal function and/or serum levels. Thank you,  Danna Zavala, WALTERD    Recommended duration of therapy  [email or web address]/Pharmacy/Clinical%20Companion/[email or web address]. [email or web address]    Renal Dosing  [email or web address]/[facility]/Pharmacy/Clinical%20Companion/Renal%[email or web address]. pdf

## 2019-03-16 NOTE — PROGRESS NOTES
PCU SHIFT NURSING NOTE      Bedside shift change report given to Flavio (oncoming nurse) by Kaz Woodward (offgoing nurse). Report included the following information SBAR and Kardex. Shift Summary: pt not tolerating PO Rx - pocketing rx crushed in applesauce    0640  Received call from tele reporting sustained v-tach on monitor. Upon arrival with Amelia Barnett, RN pt unresponsive and pulseless. CPR started and code blue called. Timed notes from code by charge  [facility] - handed off to [facility] CCU RN    0700  Pt intubated successfully and transferred to CCU  TRANSFER - OUT REPORT:    Verbal report given to Daniela(name) on [facility]  being transferred to CCU(unit) for urgent transfer       Report consisted of patients Situation, Background, Assessment and   Recommendations(SBAR). Information from the following report(s) SBAR, Kardex, Intake/Output, MAR, Accordion, Recent Results and Cardiac Rhythm Afib was reviewed with the receiving nurse. Lines:   Peripheral IV 03/14/19 Left Forearm (Active)   Site Assessment Clean, dry, & intact 3/16/2019 12:00 AM   Phlebitis Assessment 0 3/16/2019 12:00 AM   Infiltration Assessment 0 3/16/2019 12:00 AM   Dressing Status Clean, dry, & intact 3/16/2019 12:00 AM   Dressing Type Transparent;Tape 3/16/2019 12:00 AM   Hub Color/Line Status Pink 3/16/2019 12:00 AM        Opportunity for questions and clarification was provided.       Patient transported with:   Monitor              Admission Date 3/14/2019   Admission Diagnosis Sepsis ([facility] [address] [A41.9]  UTI (urinary tract infection) [N39.0]  Pneumonia [J18.9]   Consults IP CONSULT TO HOSPITALIST  IP CONSULT TO CARDIOLOGY  IP CONSULT TO PALLIATIVE CARE - PROVIDER        Consults   [x]PT   [x]OT   [x]Speech   [x]Case Management      [x] Palliative      Cardiac Monitoring Order   [x]Yes   []No     IV drips   []Yes    Drip:                            Dose:  Drip:                            Dose:  Drip:                            Dose: [x]No     GI Prophylaxis   []Yes   []No         DVT Prophylaxis   SCDs:             Ernesto stockings:         [] Medication   []Contraindicated   []None      Activity Level Activity Level: Bed Rest     Activity Assistance: Complete care   Purposeful Rounding every 1-2 hour? [x]Yes   Cha Score  Total Score: 2   Bed Alarm (If score 3 or >)   [x]Yes   [] Refused (See signed refusal form in chart)   Keegan Score  Keegan Score: 11   Keegan Score (if score 14 or less)   [x]PMT consult   [x]Wound Care consult      []Specialty bed   [] Nutrition consult          Needs prior to discharge:   Home O2 required:    []Yes   [x]No    If yes, how much O2 required? Other:    Last Bowel Movement: Last Bowel Movement Date: 03/14/19      Influenza Vaccine          Pneumonia Vaccine           Diet Active Orders   Diet    DIET DYSPHAGIA PUREED (NDD1)      LDAs               Peripheral IV 03/14/19 Left Forearm (Active)   Site Assessment Clean, dry, & intact 3/15/2019  7:00 PM   Phlebitis Assessment 0 3/15/2019  7:00 PM   Infiltration Assessment 0 3/15/2019  7:00 PM   Dressing Status Clean, dry, & intact 3/15/2019  7:00 PM   Dressing Type Transparent;Tape 3/15/2019  7:00 PM   Hub Color/Line Status Pink;Capped 3/15/2019  7:00 PM          External Female Catheter 03/14/19 (Active)   Site Assessment Clean, dry, & intact 3/15/2019 12:46 AM   Repositioned Yes 3/15/2019 12:46 AM   Perineal Care Yes 3/15/2019 12:46 AM   Wick Changed No 3/15/2019 12:46 AM   Suction Canister/Tubing Changed No 3/15/2019 12:46 AM                Urinary Catheter      Intake & Output Date 03/15/19 0700 - 03/16/19 0659 03/16/19 0700 - 03/17/19 0659   Shift [id number] 1900-0659 24 Hour Total 6006-6528 6948-3028 24 Hour Total   INTAKE   P.O. 120  120        P. O. 120  120      I. V.(mL/kg/hr) 0(0)  0        Volume (0.9% sodium chloride with KCl 20 mEq/L infusion) 0  0      Shift Total(mL/kg) 120(2.6)  120(2.6)      OUTPUT   Urine(mL/kg/hr)           Urine Occurrence(s) 1 x 1 x 2 x      Stool           Stool Occurrence(s) 1 x 1 x 2 x      Shift Total(mL/kg)           120      Weight (kg) 45.7 45.7 45.7 45.7 45.7 45.7         Readmission Risk Assessment Tool Score High Risk            24       Total Score        9 Pt. Coverage (Medicare=5 , Medicaid, or Self-Pay=4)    15 Charlson Comorbidity Score (Age + Comorbid Conditions)        Criteria that do not apply:    Has Seen PCP in Last 6 Months (Yes=3, No=0)    . Living with Significant Other. Assisted Living. LTAC. SNF.  or   Rehab    Patient Length of Stay (>5 days = 3)    IP Visits Last 12 Months (1-3=4, 4=9, >4=11)       Expected Length of Stay 3d 16h   Actual Length of Stay 2              Admission documentation incomplete; pt non-verbal/demented with no family at bedside

## 2019-03-16 NOTE — PROGRESS NOTES
Responded to CODE BLUE  Chest compressions ongoing on arrival, patient began to awaken and moan. Monitor showed ventricular tachycardia patient defibrillated with 200 J, achieved sinus rhythm with a normal blood pressure. Patient had increased work of breathing and hypoxia requiring BVM. Intubated with 7-1/2 ET tube via glide scope. 150 mg of amiodarone given IV, 2 g of mag. Patient will be transitioned to the ICU.

## 2019-03-16 NOTE — PROGRESS NOTES
Hospitalist Progress Note    NAME: Michi Menchaca   :  3/7/1930   MRN:  [id number]       Assessment / Plan:  Code Blue (? Unstable Vtach vs Vtach arrest)  Currently in icu and opens eyes. Dc levaquin, check magnesium. Ns wth 20meq of kcl held for propofol, will get cvl. Give iv kcl 40 meq iv.  place on cefepime and flagyl. Currently appears to be in afib with pvc (Qtc 528), echo ordered  Unable to access rhythm strip at this time as pt was moved from pcu to icu. Sepsis, POA due to  Extensive right sided aspiration pneumonia, POA  UTI, POA  Hypokalemia, POA.  Due to vomiting and diarrhea. Was on ns with 20meqkcl. Will order 40meq kcl iv and check mag. Fecal stasis in rectum, POA.  Having diarrhea around fecal stasis. Dementia - palliative care consulted. DM    Decubitus buttock ulcer, POA and Sacral redness, POA per ER nurse - wond consulded       dw Son Shreya Nicholas who reiterates full code until he comes in.         Fecal impaction - try dulcolax suppository   Son apparently wants full code. On dysphagia puree per speech. less than 18.5 Underweight / Body mass index is 16.26 kg/m². Code status: Full  Prophylaxis: eliquis  Recommended Disposition: SNF/LTC     Subjective:     Chief Complaint / Reason for Physician Visit  \"code blue last night, intubated. Currently opens eyes. Baseline MS appears intact\". Discussed with RN events overnight. Review of Systems:  Symptom Y/N Comments  Symptom Y/N Comments   Fever/Chills    Chest Pain     Poor Appetite    Edema     Cough    Abdominal Pain     Sputum    Joint Pain     SOB/COLEMAN    Pruritis/Rash     Nausea/vomit    Tolerating PT/OT     Diarrhea    Tolerating Diet     Constipation    Other       Could NOT obtain due to:      Objective:     VITALS:   Last 24hrs VS reviewed since prior progress note.  Most recent are:  Patient Vitals for the past 24 hrs:   Temp Pulse Resp BP SpO2   19 0725 99.5 °F (37.5 °C) (!) 121 (!) 33 165/78 100 %   03/16/19 0720  (!) 107 21 171/70 (!) 82 %   03/16/19 0715  (!) 110 26 137/90    03/16/19 0712   24     03/16/19 0659  87   100 %   03/16/19 0657  (!) 102  139/86 100 %   03/16/19 0655  (!) 124   100 %   03/16/19 0651    145/77    03/16/19 0648     100 %   03/16/19 0645    156/86    03/16/19 0642  (!) 190      03/16/19 0641  72   97 %   03/15/19 2206 98.8 °F (37.1 °C) 66 20 107/83 97 %   03/15/19 2201  67 22  98 %   03/15/19 2000  64  162/52 97 %   03/15/19 1846 98.7 °F (37.1 °C) 69 24 149/77 98 %   03/15/19 1500 99.4 °F (37.4 °C) 65 (!) 31 103/64 100 %   03/15/19 1400  60 26 165/52 99 %   03/15/19 1300  (!) 58 25 171/51 99 %   03/15/19 1200  (!) 59 21 160/47 98 %   03/15/19 1141  (!) 56 22 137/60 99 %   03/15/19 1100 98.3 °F (36.8 °C) 73 22 167/63 96 %   03/15/19 0911  64 28 150/66 96 %   03/15/19 0800 98.2 °F (36.8 °C) 68 22 162/69        Intake/Output Summary (Last 24 hours) at 3/16/2019 0744  Last data filed at 3/16/2019 0600  Gross per 24 hour   Intake 180 ml   Output 900 ml   Net -720 ml        PHYSICAL EXAM:  General: Intubated but opens eyes to verbal command   EENT:  EOMI. Anicteric sclerae. MMM  Resp:  CTA bilaterally, no wheezing or rales. No accessory muscle use  CV:  Irregular rhythm,  No edema  GI:  Soft, Non distended, Non tender.  +Bowel sounds    Skin:  No rashes. No jaundice    Reviewed most current lab test results and cultures  YES  Reviewed most current radiology test results   YES  Review and summation of old records today    NO  Reviewed patient's current orders and MAR    YES  PMH/SH reviewed - no change compared to H&P  ________________________________________________________________________  Care Plan discussed with:    Comments   Patient     Family      RN     Care Manager     Consultant                        Multidiciplinary team rounds were held today with , nursing, pharmacist and clinical coordinator.   Patient's plan of care was discussed; medications were reviewed and discharge planning was addressed. ________________________________________________________________________  Total NON critical care TIME:  30   Minutes cc time    Total CRITICAL CARE TIME Spent:   Minutes non procedure based      Comments   >50% of visit spent in counseling and coordination of care     ________________________________________________________________________  Mai Richter DO     Procedures: see electronic medical records for all procedures/Xrays and details which were not copied into this note but were reviewed prior to creation of Plan. LABS:  I reviewed today's most current labs and imaging studies.   Pertinent labs include:  Recent Labs     03/15/19  0129 03/14/19  0901   WBC 12.7* 15.0*   HGB 11.1* 12.6   HCT 33.3* 36.7    243     Recent Labs     03/16/19  0622 03/15/19  0129 03/14/19  0940    143 141   K 2.7* 3.0* 3.0*   * 111* 106   CO2 19* 24 28   GLU 89 86 107*   BUN 9 15 25*   CREA 0.40* 0.54* 0.81   CA 8.1* 8.0* 8.6   MG  --  1.8  --    PHOS  --  1.2*  --    ALB  --   --  2.5*   TBILI  --   --  2.2*   SGOT  --   --  16   ALT  --   --  10*       Signed: Mai Richter DO

## 2019-03-16 NOTE — PROGRESS NOTES
[address] [place] [address]  [phone]      3/16/2019 12:26 PM    Admit Date: 3/14/2019    Admit Diagnosis:   Sepsis ([address] [A41.9]  UTI (urinary tract infection) [N39.0]  Pneumonia [J18.9]    Subjective:     Tamiko Grande is critically ill.   Intubated    Visit Vitals  /67   Pulse (!) 58   Temp 97.9 °F (36.6 °C)   Resp 17   Ht 5' 6\" (1.676 m)   Wt 100 lb 12 oz (45.7 kg)   SpO2 94%   BMI 16.26 kg/m²       Current Facility-Administered Medications   Medication Dose Route Frequency    NOREPINephrine (LEVOPHED) 8 mg in 5% dextrose 250mL infusion  2-30 mcg/min IntraVENous TITRATE    propofol (DIPRIVAN) 10 mg/mL injection  0-50 mcg/kg/min IntraVENous TITRATE    potassium chloride 10 mEq in 100 ml IVPB  10 mEq IntraVENous Q1H    [START ON 3/17/2019] vancomycin (VANCOCIN) 750 mg in 0.9% sodium chloride (MBP/ADV) 250 mL  750 mg IntraVENous Q16H    vancomycin (VANCOCIN) 2000 mg in  ml infusion  2,000 mg IntraVENous NOW    cefepime (MAXIPIME) 1 g in 0.9% sodium chloride (MBP/ADV) 50 mL  1 g IntraVENous Q8H    metroNIDAZOLE (FLAGYL) IVPB premix 500 mg  500 mg IntraVENous Q12H    heparin (porcine) pf 300 Units  300 Units InterCATHeter PRN    bacitracin 500 unit/gram packet 1 Packet  1 Packet Topical PRN    propofol (DIPRIVAN) 10 mg/mL injection        0.9% sodium chloride with KCl 20 mEq/L infusion   IntraVENous CONTINUOUS    potassium, sodium phosphates (NEUTRA-PHOS) packet 2 Packet  2 Packet Oral QID    dilTIAZem (CARDIZEM) IR tablet 30 mg  30 mg Oral AC&HS    apixaban (ELIQUIS) tablet 2.5 mg  2.5 mg Oral BID    metoprolol tartrate (LOPRESSOR) tablet 12.5 mg  12.5 mg Oral BID    hydrALAZINE (APRESOLINE) 20 mg/mL injection 10 mg  10 mg IntraVENous Q6H PRN    acetaminophen (TYLENOL) tablet 650 mg  650 mg Oral Q6H PRN    sodium chloride (NS) flush 5-10 mL  5-10 mL IntraVENous PRN    levalbuterol (XOPENEX) nebulizer soln 0.63 mg/3 mL  0.63 mg Nebulization Q6H PRN    ipratropium (ATROVENT) 0.02 % nebulizer solution 0.5 mg  0.5 mg Nebulization Q6H PRN       Objective:      Physical Exam:  General Appearance:  sedated/intubated  Chest:   rhonchi  Cardiovascular:  Regular rate and rhythm, S1, S2 normal, no murmur. Abdomen:   Soft, non-tender, bowel sounds are active. Extremities:   Skin:  Warm and dry. Data Review:   Recent Labs     03/16/19  0817 03/15/19  0129 03/14/19  0901   WBC 14.1* 12.7* 15.0*   HGB 10.3* 11.1* 12.6   HCT 30.4* 33.3* 36.7    159 243     Recent Labs     03/16/19  0817 03/16/19  0622 03/15/19  0129 03/14/19  0940   NA  --  142 143 141   K  --  2.7* 3.0* 3.0*   CL  --  112* 111* 106   CO2  --  19* 24 28   GLU  --  89 86 107*   BUN  --  9 15 25*   CREA  --  0.40* 0.54* 0.81   CA  --  8.1* 8.0* 8.6   MG 2.7* 1.7 1.8  --    PHOS  --   --  1.2*  --    ALB  --   --   --  2.5*   TBILI  --   --   --  2.2*   SGOT  --   --   --  16   ALT  --   --   --  10*       Recent Labs     03/16/19  0817 03/16/19  0622 03/15/19  0129 03/14/19  1633 03/14/19  0940   TROIQ 0.29* 0.18* <0.05 <0.05 <0.05   CPK 95  --   --  50 47   CKMB 3.1  --   --  1.9 1.3         Intake/Output Summary (Last 24 hours) at 3/16/2019 1226  Last data filed at 3/16/2019 1200  Gross per 24 hour   Intake 4034.94 ml   Output 1038 ml   Net 2996.94 ml            Assessment:     Active Problems:    Dementia (8/16/2011)      UTI (urinary tract infection) (3/12/2015)      Pneumonia (3/14/2019)      Sepsis ([address] (3/14/2019)      Paroxysmal atrial fibrillation (HCC) (3/15/2019)        Plan:     Suzie Thomason is a 80 y.o. female who was admitted with sepsis/PNA/UTI. Cardiology consulted for Afib RVR. CHADSVASC=6. Pt is s/p cardiac arrest overnight, VT, currently intubated. Wbcs trending up. K 2.7 - being repleated. Troponin going up post arrest.    She is in sinus rhythm at this time. Continue with supportive care. Echo pending.      Aureliano Corea NP    Patient seen and examined by me with nurse practitioner. I personally performed all components of the history, physical, and medical decision making and agree with the assessment and plan with minor modifications as noted. Treat hypokalemia. Cont dilt for rate control. Cont supportive care.  Echo pending    Jocelyn Garduno MD, [facility]    3/16/2019

## 2019-03-16 NOTE — PROGRESS NOTES
Spiritual Care Assessment/Progress Note  [facility]      NAME: Radha Morgan      MRN: [id number]  AGE: 80 y.o.  SEX: female  Rastafarian Affiliation: Mandaeism   Language: English     3/16/2019     Total Time (in minutes): 10     Spiritual Assessment begun in [facility] 2 CRITICAL CARE 2 through conversation with:         []Patient        [] Family    [] Friend(s)        Reason for Consult: Palliative Care, Initial/Spiritual Assessment, Initial/Spiritual assessment, patient floor, Initial visit     Spiritual beliefs: (Please include comment if needed)     [] Identifies with a nino tradition:         [] Supported by a nino community:            [] Claims no spiritual orientation:           [] Seeking spiritual identity:                [] Adheres to an individual form of spirituality:           [x] Not able to assess:                           Identified resources for coping:      [] Prayer                               [] Music                  [] Guided Imagery     [] Family/friends                 [] Pet visits     [] Devotional reading                         [x] Unknown     [] Other:                                             Interventions offered during this visit: (See comments for more details)    Patient Interventions: Initial/Spiritual assessment, Critical care, Initial visit           Plan of Care:     [] Support spiritual and/or cultural needs    [] Support AMD and/or advance care planning process      [] Support grieving process   [] Coordinate Rites and/or Rituals    [] Coordination with community clergy   [x] No spiritual needs identified at this time   [] Detailed Plan of Care below (See Comments)  [] Make referral to Music Therapy  [] Make referral to Pet Therapy     [] Make referral to Addiction services  [] Make referral to [facility]  [] Make referral to Spiritual Care Partner  [] No future visits requested        [x] Follow up visits as needed     Comments:  made initial visit to patients room in CCU but was unable to do a consult as patient was intubated and non responsive. There was no family present in the room at this time. Spiritual Care will follow up as needed and able.     Derek Patel  Pager: [phone]

## 2019-03-16 NOTE — PROGRESS NOTES
PICC (Peripherally Inserted Central Catheter) line insertion  procedure note :     Procedure explained to patient's son along with risks and benefits  and patient's son agreed to proceed. Informed consent obtained from  Patient's son. Pt is intubated at [facility]. Patient teaching completed. Timeout completed. Pre-procedure assessment done. Maximum sterile barrier precautions observed throughout procedure. Lidocaine 1%  3.0    ml sq given prior to cannulation. Cannulated brachial  vein using ultrasound guidance and modified seldinger technique. Inserted 5  Sinhala double  lumen PICC to right arm using Springest Tip Location System and  [address]. Pt has    sinus   rhythm. PICC tip location was confirmed by 3 CG tip positioning system, indicating tall P wave and no negative deflection before P wave which would indicate that the PICC tip is properly placed in the distal SVC or at the [place]. PICC tip location was  confirmed by 2 PICC nurses and 3CG printout placed on patient's chart. Blood return verified and flushed with 20 ml normal saline in each port. Sterile dressing applied with biopatch, statLock and occlusive dressing as per protocol. Curos caps applied to each port. Patient tolerated procedure well with minimal blood loss ( less than 5 ml.)   Patient education material provided. PICC procedure performed by  :  Eleonora Naqvi RN. PICC nurse  Assisted by : Po Hackett RN  PICC nurse  Reason for access : reliable access / MD order /   Hemodynamically unstable / Vesicant IV medication infusion  Complications related to insertion  : none  X-Ray : not applicable  Notified primary nurse  Nohemi Majano RN  that  PICC line can be used. Total Trimmed Length :  36   cm   External Length :    At the hub        PICC line site arm circumference:  23    cm   PICC catheter occupies  13   % of vein  Type of PICC: Bard Solo Power PICC   Ref # :   [id number]   Lot # : [id number]  Expiration Date :    2020-05-31     Issa Flores RN. BSN. CÉSAR,CMSRN. Clinician IV .  PICC Nurse, Vascular Access Team.

## 2019-03-17 NOTE — PROGRESS NOTES
03/17/19 0538   Weaning Parameters   Spontaneous Breathing Trial Complete Yes   Resp Rate Observed 24   Ve 429   VT 9.2   RSBI 47     SBT passed, patient back on A/C mode.

## 2019-03-17 NOTE — ROUTINE PROCESS
Bedside and Verbal shift change report received from ARTURO DAVIS (offgoing nurse). Report included the following information SBAR, Kardex, ED Summary, Procedure Summary, Intake/Output, MAR, Accordion, Recent Results, Med Rec Status, Cardiac Rhythm sinus arrhythmia, Alarm Parameters , Pre Procedure Checklist and Quality Measures. Her daughter and grand daughter are at the bedside. Dorian Elliott will open her eyes, however she does not follow any commands. 0800:Neurological status is unchanged, suctioned for a small amount of white sputum, and repositioned to her side. The right side is weaker than the left. 0910:Propofol decreased to 30 mcq/kg/min. 0930:Assessment is unchanged. 1030:Her family remains at the bedside, she is not responding to their voice, but she will open her eyes. 1200: The cardiac monitor denotes sinus bradycardia, therefore I held the Diltiazem at this time. [place] care completed and repositioned for comfort. Suctioned for a moderate amount of white sputum. 1400:Other family members are at the bedside, no response noted by Mrs. Khanh Rivera. 1600:Noted with liquid stool from the rectum; [place] and skin care completed, and repositioned for comfort. 1715: More awake, she has her left hand up on her chest, however she is not following commands. Propofol increased to 35 mcq/kg/min. 1805: More awake with her eyes open, and movement of the LUE. Propofol increased to 40 mcq/kg/min. 1830:Resting with her eyes closed, continue the current plan of care. 1930:Bedside and Verbal shift change report given to HALEIGH Kwon RN (oncoming nurse) by myself (offgoing nurse). Report included the following information SBAR, Kardex, ED Summary, OR Summary, Procedure Summary, Intake/Output, MAR, Accordion, Recent Results, Med Rec Status, Cardiac Rhythm sinus bradycardia , Alarm Parameters , Procedure Verification and Quality Measures.

## 2019-03-17 NOTE — CONSULTS
Consult Date: 3/17/2019    Consults   Pt with resp failure, multiple other med issues, demential and other, concern of RIGHT Pneumothorax on CXR this am v. Soft tissue changes. ? Need Chest tube or Expectant rx or IR small pleural cath. I reveiewed with radiology and rec repeat cxr with supine positioning and clarify or CT chest if still of concern and then decide on Chest tube or pleural cath or merely FU. Pt with right lung consolidation on CXR    RW with Dr Geeta Howe. Subjective   Pt with resp failure, multiple other med issues, demential and other, concern of RIGHT Pneumothorax on CXR this am v. Soft tissue changes. ? Need Chest tube or Expectant rx or IR small pleural cath. I reveiewed with radiology and rec repeat cxr with supine positioning and clarify or CT chest if still of concern and then decide on Chest tube or pleural cath or merely FU. Past Medical History:   Diagnosis Date    Alzheimer's dementia     Dementia     Diabetes ([facility] [address]     Hypertension     Stroke ([facility] [address] 2011    TIA      Past Surgical History:   Procedure Laterality Date    HX GYN      hysterectomy     History reviewed. No pertinent family history.    Social History     Tobacco Use    Smoking status: Never Smoker    Smokeless tobacco: Never Used   Substance Use Topics    Alcohol use: No       Current Facility-Administered Medications   Medication Dose Route Frequency Provider Last Rate Last Dose    NOREPINephrine (LEVOPHED) 8 mg in 5% dextrose 250mL infusion  2-30 mcg/min IntraVENous TITRATE Hayden Gilbert, DO   Stopped at 03/16/19 0800    propofol (DIPRIVAN) 10 mg/mL injection  0-50 mcg/kg/min IntraVENous TITRATE Carey Shyam, DO 8.2 mL/hr at 03/17/19 0910 30 mcg/kg/min at 03/17/19 0910    cefepime (MAXIPIME) 1 g in 0.9% sodium chloride (MBP/ADV) 50 mL  1 g IntraVENous Q8H Hayden Gilbert  mL/hr at 03/17/19 0906 1 g at 03/17/19 0906    metroNIDAZOLE (FLAGYL) IVPB premix 500 mg  500 mg IntraVENous Q12H Ofelia Hayden LACEY,  mL/hr at 03/17/19 0849 500 mg at 03/17/19 0849    heparin (porcine) pf 300 Units  300 Units InterCATHeter PRN Hayden Gilbert, DO        bacitracin 500 unit/gram packet 1 Packet  1 Packet Topical PRN Hayden Gilbert, DO        vancomycin (VANCOCIN) 750 mg in 0.9% sodium chloride (MBP/ADV) 250 mL  750 mg IntraVENous Q18H Hayden Gilbert, DO        chlorhexidine (PERIDEX) 0.12 % mouthwash 15 mL  15 mL Oral Q12H Hayden Gilbert, DO   15 mL at 03/17/19 0904    mupirocin (BACTROBAN) 2 % ointment   Both Nostrils Q12H Hayden Gilbert, DO        influenza vaccine 2018-19 (6 mos+)(PF) (FLUARIX QUAD/FLULAVAL QUAD) injection 0.5 mL  0.5 mL IntraMUSCular PRIOR TO DISCHARGE Hayden Gilbert DO        0.9% sodium chloride with KCl 20 mEq/L infusion   IntraVENous CONTINUOUS Hayden Gilbert  mL/hr at 03/17/19 0920      potassium, sodium phosphates (NEUTRA-PHOS) packet 2 Packet  2 Packet Oral QID Kaz Page NP   2 Packet at 03/17/19 0855    dilTIAZem (CARDIZEM) IR tablet 30 mg  30 mg Oral AC&HS Alexus Bains, NP   30 mg at 03/17/19 0856    metoprolol tartrate (LOPRESSOR) tablet 12.5 mg  12.5 mg Oral BID Alexus Bains, NP   12.5 mg at 03/17/19 0856    hydrALAZINE (APRESOLINE) 20 mg/mL injection 10 mg  10 mg IntraVENous Q6H PRN Alexus Bains NP   10 mg at 03/17/19 0603    acetaminophen (TYLENOL) tablet 650 mg  650 mg Oral Q6H PRN Daryl Ortez MD   650 mg at 03/15/19 1735    sodium chloride (NS) flush 5-10 mL  5-10 mL IntraVENous PRN Connie Logan MD        levalbuterol Radha Forts) nebulizer soln 0.63 mg/3 mL  0.63 mg Nebulization Q6H PRN Daryl Ortez MD        ipratropium (ATROVENT) 0.02 % nebulizer solution 0.5 mg  0.5 mg Nebulization Q6H PRN Daryl Ortez MD            Review of Systems   Unable to perform ROS: Intubated       Objective     Vital signs for last 24 hours:  Visit Vitals  BP 98/54   Pulse (!) 121   Temp 97.8 °F (36.6 °C)   Resp 18   Ht 5' 6\" (1.676 m)   Wt 100 lb (45.4 kg)   SpO2 99%   BMI 16.14 kg/m²       Intake/Output this shift:  Current Shift: 03/17 0701 - 03/17 1900  In: 300 [I.V.:300]  Out: 110 [Urine:110]  Last 3 Shifts: 03/15 1901 - 03/17 0700  In: 7451.6 [P.O.:60; I.V.:5651.6]  Out: 8548 [Urine:1498]    Data Review:   Recent Results (from the past 24 hour(s))   CULTURE, RESPIRATORY/SPUTUM/BRONCH W GRAM STAIN    Collection Time: 03/16/19 10:27 AM   Result Value Ref Range    Special Requests: NO SPECIAL REQUESTS      GRAM STAIN NO EPITHELIAL CELLS SEEN     GRAM STAIN OCCASIONAL WBCS SEEN      GRAM STAIN NO ORGANISMS SEEN      Culture result: PENDING    ECHO ADULT COMPLETE    Collection Time: 03/16/19  1:36 PM   Result Value Ref Range    LA Volume 53.4 (A) 22 - 52 mL    Right Atrial Area 4C 13.40 cm2    LV E' Lateral Velocity 6.21 cm/s    LV E' Septal Velocity 3.75 cm/s    Tapse 2.46 (A) 1.5 - 2.0 cm    Ao Root D 2.64 cm    Aortic Valve Systolic Peak Velocity 401.52 cm/s    AoV VTI 34.05 cm    AoV PG 7.8 mmHg    LVIDd 5.14 3.9 - 5.3 cm    LVPWd 1.10 (A) 0.6 - 0.9 cm    LVIDs 4.28 cm    IVSd 1.10 (A) 0.6 - 0.9 cm    LVOT Peak Velocity 84.30 cm/s    LVOT Peak Gradient 2.8 mmHg    LVOT VTI 19.80 cm    MV A Joe 67.95 cm/s    MV E Joe 65.45 cm/s    MV E/A 0.96     Left Atrium to Aortic Root Ratio 1.36     RVIDd 2.12 cm    Aortic Valve Systolic Mean Gradient 4.7 mmHg    LA Vol 4C 49.87 22 - 52 mL    LA Vol 2C 45.16 22 - 52 mL    LA Area 2C 16.94 cm2    LA Area 4C 18.1 cm2    LV Mass .4 (A) 67 - 162 g    LV Mass AL Index 250.6 (A) 43 - 95 g/m2    E/E' lateral 10.54     E/E' septal 17.45     RVSP 48.7 mmHg    E/E' ratio (averaged) 14.00     Est. RA Pressure 10.0 mmHg    Mitral Regurgitant Peak Velocity 488. 36 cm/s    Mitral Valve E Wave Deceleration Time 204.4 ms    Left Atrium Major Axis 3.58 cm    Triscuspid Valve Regurgitation Peak Gradient 38.7 mmHg    Aortic Regurgitant Pressure Half-time 464.6 cm    Pulmonic Valve Max Velocity 62.96 cm/s TR Max Velocity 310.87 cm/s    LA Vol Index 35.84 16 - 28 ml/m2    PASP 48.7 mmHg    LA Vol Index 30.31 16 - 28 ml/m2    LA Vol Index 33.47 16 - 28 ml/m2    MR Peak Gradient 95.4 mmHg    AR Max Joe 430.39 cm/s    PV peak gradient 1.6 mmHg   METABOLIC PANEL, BASIC    Collection Time: 03/16/19  1:49 PM   Result Value Ref Range    Sodium 140 136 - 145 mmol/L    Potassium 3.7 3.5 - 5.1 mmol/L    Chloride 109 (H) 97 - 108 mmol/L    CO2 21 21 - 32 mmol/L    Anion gap 10 5 - 15 mmol/L    Glucose 134 (H) 65 - 100 mg/dL    BUN 10 6 - 20 MG/DL    Creatinine 0.37 (L) 0.55 - 1.02 MG/DL    BUN/Creatinine ratio 27 (H) 12 - 20      GFR est AA >60 >60 ml/min/1.73m2    GFR est non-AA >60 >60 ml/min/1.73m2    Calcium 7.7 (L) 8.5 - 75.1 MG/DL   METABOLIC PANEL, BASIC    Collection Time: 03/16/19  6:32 PM   Result Value Ref Range    Sodium 140 136 - 145 mmol/L    Potassium 3.5 3.5 - 5.1 mmol/L    Chloride 111 (H) 97 - 108 mmol/L    CO2 20 (L) 21 - 32 mmol/L    Anion gap 9 5 - 15 mmol/L    Glucose 102 (H) 65 - 100 mg/dL    BUN 11 6 - 20 MG/DL    Creatinine 0.31 (L) 0.55 - 1.02 MG/DL    BUN/Creatinine ratio 35 (H) 12 - 20      GFR est AA >60 >60 ml/min/1.73m2    GFR est non-AA >60 >60 ml/min/1.73m2    Calcium 7.5 (L) 8.5 - 10.1 MG/DL   CBC W/O DIFF    Collection Time: 03/17/19  4:43 AM   Result Value Ref Range    WBC 7.9 3.6 - 11.0 K/uL    RBC 3.00 (L) 3.80 - 5.20 M/uL    HGB 8.9 (L) 11.5 - 16.0 g/dL    HCT 26.8 (L) 35.0 - 47.0 %    MCV 89.3 80.0 - 99.0 FL    MCH 29.7 26.0 - 34.0 PG    MCHC 33.2 30.0 - 36.5 g/dL    RDW 14.3 11.5 - 14.5 %    PLATELET 877 128 - 900 K/uL    MPV 11.3 8.9 - 12.9 FL    NRBC 0.0 0  WBC    ABSOLUTE NRBC 0.00 0.00 - 0.01 K/uL   MAGNESIUM    Collection Time: 03/17/19  4:43 AM   Result Value Ref Range    Magnesium 2.0 1.6 - 2.4 mg/dL   PHOSPHORUS    Collection Time: 03/17/19  4:43 AM   Result Value Ref Range    Phosphorus 2.6 2.6 - 4.7 MG/DL   METABOLIC PANEL, COMPREHENSIVE    Collection Time: 03/17/19  4:43 AM   Result Value Ref Range    Sodium 143 136 - 145 mmol/L    Potassium 3.2 (L) 3.5 - 5.1 mmol/L    Chloride 114 (H) 97 - 108 mmol/L    CO2 20 (L) 21 - 32 mmol/L    Anion gap 9 5 - 15 mmol/L    Glucose 82 65 - 100 mg/dL    BUN 10 6 - 20 MG/DL    Creatinine 0.30 (L) 0.55 - 1.02 MG/DL    BUN/Creatinine ratio 33 (H) 12 - 20      GFR est AA >60 >60 ml/min/1.73m2    GFR est non-AA >60 >60 ml/min/1.73m2    Calcium 7.4 (L) 8.5 - 10.1 MG/DL    Bilirubin, total 1.7 (H) 0.2 - 1.0 MG/DL    ALT (SGPT) 19 12 - 78 U/L    AST (SGOT) 29 15 - 37 U/L    Alk. phosphatase 55 45 - 117 U/L    Protein, total 5.2 (L) 6.4 - 8.2 g/dL    Albumin 1.7 (L) 3.5 - 5.0 g/dL    Globulin 3.5 2.0 - 4.0 g/dL    A-G Ratio 0.5 (L) 1.1 - 2.2         Physical Exam   Constitutional:   Thin Female, on vent, family at bedside. Cardiovascular: Normal rate. Pulmonary/Chest:   BS decrease right but consolidated on CXR    Neurological:   Pt ams intubated    Nursing note and vitals reviewed.

## 2019-03-17 NOTE — PROGRESS NOTES
[facility] INTENSIVIST Consult Service Note  Pulmonary, Critical Care, and Sleep Medicine    Name: Joycelyn Guerra MRN: [id number]   : 3/7/1930 Hospital: [facility]   Date: 3/17/2019  Admission date: 3/14/2019 Hospital Day: 4       Subjective/Interval History:   Seen earlier today on rounds. Pt is unstable and acutely ill in the CCU. Patient was re-evaluated multiple times with repeated discussions with CCU team throughout the day    3/17 failed SBT. D/w Dr. Sahara Painter. Possible pTX? After discussion with radiology, repeat CXR show skin folds only! Off pressors    IMPRESSION:   1. Acute Respiratory Failure with Hypoxia, pt failed SBt due to agitation  2. Abnormal CXR- right sided changes are skin folds. Not Pneumothorax- apprecaite  and radiology's help. 3. Severe Sepsis with Shock POA off pressors  4. Extensive right sided aspiration pneumonia, POA  5. Klebsiella UTI, POA  6. Hypokalemia, POA. Due to vomiting and diarrhea  7. Afib A flutter with RVR  8. Fecal stasis in rectum, POA. Having diarrhea around fecal stasis. 9. HTN   10. Dementia  11. H/o CVA per son  15. DM  13. Wheelchair bound  14. Decubitus buttock ulcer, POA and Sacral redness, POA per ER nurse  Body mass index is 16.14 kg/m². 15. Additional workup outlined below  16. Multiorgan dysfunction as outlined above: Pt has one or more acute or chronic illnesses with severe exacerbation with progression or side effects of treatment that poses a threat to life or bodily function      RECOMMENDATIONS/PLAN:   1. CCU monitoring  2. adjsut sedation  3. No CPR, No defib  4. Once extubated, will ask palliative care to clarify DDNR for discharge home? 5. Ventilator for mechanical life support and prevent respiratory arrest with protective lung strategies  6. Daily AM SAT,SBT   7. Agree with Empiric IV antibiotics pending culture results   8. Follow culture results  9. cardiology following  10.  IV vasopressors for circulatory shock refractory to fluids to maintain SBP> 90  11. CXR in AM while on vent  12. Transfuse prn to maintain Hgb > 7  13. Glucose monitoring and SSI  14. Replete electrolytes  15. Labs to follow electrolytes, renal function and and blood counts  16. Bronchial hygiene with respiratory therapy techniques, bronchodilators  17. Pt needs IV fluids with additives and Drug therapy requiring intensive monitoring for toxicity  18. Prescription drug management with home med reconciliation reviewed  19. DVT, SUP prophylaxis  20. Will be available to assist in medical management while in the CCU pending disposition         Subjective/Initial History:   I have reviewed the flowsheet and previous days notes. Seen earlier today on rounds. I was asked by Salvatore Ibrahim MD to see Inez Fuentes a 80 y.o.  female  in consultation for a chief complaint of respiratory failure, septic shock    The patient is unable to give any meaningful history or review of systems due to patient factors. Excerpts from admission 3/14/2019 and consult notes reviewed as follows:     Richmond Thakkar, 80 y.o. female with PMHx significant for dementia and borderline diabetes, presents to EMS to the ED with cc of approximately 2-3 days of decreasing p.o. intake, vomiting, and diarrhea, with suspected abdominal pain. Family notes patient has been bent over apparently holding her abdomen due to pain. She is nonverbal at baseline due to history of dementia. Family has had difficulty getting patient to take any p.o. intake. They are not aware of any fever at home. They report some coughing, dry. No recent sick contacts. Patient is incontinent at baseline. \"     Jacob Nichols is a 80 y.o. female who was admitted with sepsis/PNA/UTI. Cardiology consulted after the patient developed a fib/flutter with RVR. She has since converted back to a sinus rhythm. Troponin negative. K 3.0.   CHADSVASC=6 \" Pt now in CCU and unresponsive on vent. Spoke with sons Kayli Whitehead and Dr. Shimon Boucher at bedside. Pt has long h/o multiinfarct dementia. Also has daughter Blair White, currently not here. Had CVA many yrs ago that caused dementia which has been stable since CVA. Slowing down for many months. Excellent care by family. Lives with Papa's family. Was living with Angel( mPOA). Management discussed with sons and they understand how ill and unstable pt has become. They wish that we continue efforts to help pt but if she should her heart stop, we treat her with respect but not pursue cardiac resuscitation. No CPR. No defib. We will keep them informed if she decompensates more. Will see how she does over next few days and hope she rallies. Patient PCP: Kaye Rolon MD  PMH:  has a past medical history of Alzheimer's dementia, Dementia, Diabetes ([facility] [address] Hypertension, and Stroke ([facility] [address] (2011). PSH:   has a past surgical history that includes hx gyn. FHX: family history is not on file. SHX:  reports that  has never smoked. she has never used smokeless tobacco. She reports that she does not drink alcohol or use drugs. ROS:Review of systems not obtained due to patient factors.     Allergies   Allergen Reactions    Aspirin Other (comments)     Ronugher states no allergy    Pcn [Penicillins] Nausea and Vomiting      MEDS:   Current Facility-Administered Medications   Medication    NOREPINephrine (LEVOPHED) 8 mg in 5% dextrose 250mL infusion    propofol (DIPRIVAN) 10 mg/mL injection    cefepime (MAXIPIME) 1 g in 0.9% sodium chloride (MBP/ADV) 50 mL    metroNIDAZOLE (FLAGYL) IVPB premix 500 mg    heparin (porcine) pf 300 Units    bacitracin 500 unit/gram packet 1 Packet    vancomycin (VANCOCIN) 750 mg in 0.9% sodium chloride (MBP/ADV) 250 mL    chlorhexidine (PERIDEX) 0.12 % mouthwash 15 mL    mupirocin (BACTROBAN) 2 % ointment    influenza vaccine 2018-19 (6 mos+)(PF) (FLUARIX QUAD/FLULAVAL QUAD) injection 0.5 mL    0.9% sodium chloride with KCl 20 mEq/L infusion    potassium, sodium phosphates (NEUTRA-PHOS) packet 2 Packet    dilTIAZem (CARDIZEM) IR tablet 30 mg    metoprolol tartrate (LOPRESSOR) tablet 12.5 mg    hydrALAZINE (APRESOLINE) 20 mg/mL injection 10 mg    acetaminophen (TYLENOL) tablet 650 mg    sodium chloride (NS) flush 5-10 mL    levalbuterol (XOPENEX) nebulizer soln 0.63 mg/3 mL    ipratropium (ATROVENT) 0.02 % nebulizer solution 0.5 mg        Current Facility-Administered Medications:     NOREPINephrine (LEVOPHED) 8 mg in 5% dextrose 250mL infusion, 2-30 mcg/min, IntraVENous, TITRATE, BretstLesly justicem B, DO, Stopped at 03/16/19 0800    propofol (DIPRIVAN) 10 mg/mL injection, 0-50 mcg/kg/min, IntraVENous, TITRATE, Carey [place], DO, Last Rate: 8.2 mL/hr at 03/17/19 0910, 30 mcg/kg/min at 03/17/19 0910    cefepime (MAXIPIME) 1 g in 0.9% sodium chloride (MBP/ADV) 50 mL, 1 g, IntraVENous, Q8H, BretstLesly justicem B, DO, Last Rate: 100 mL/hr at 03/17/19 0906, 1 g at 03/17/19 0906    metroNIDAZOLE (FLAGYL) IVPB premix 500 mg, 500 mg, IntraVENous, Q12H, Bretstvinh Hayden B, DO, Last Rate: 100 mL/hr at 03/17/19 0849, 500 mg at 03/17/19 0849    heparin (porcine) pf 300 Units, 300 Units, InterCATHeter, PRN, Yostos, Hayden B, DO    bacitracin 500 unit/gram packet 1 Packet, 1 Packet, Topical, PRN, Yostvinh, Hayden B, DO    vancomycin (VANCOCIN) 750 mg in 0.9% sodium chloride (MBP/ADV) 250 mL, 750 mg, IntraVENous, Q18H, Yostos, Hayden B, DO    chlorhexidine (PERIDEX) 0.12 % mouthwash 15 mL, 15 mL, Oral, Q12H, Yostos, Hayden B, DO, 15 mL at 03/17/19 0904    mupirocin (BACTROBAN) 2 % ointment, , Both Nostrils, Q12H, Hayden Gilbert DO    influenza vaccine 2018-19 (6 mos+)(PF) (FLUARIX QUAD/FLULAVAL QUAD) injection 0.5 mL, 0.5 mL, IntraMUSCular, PRIOR TO DISCHARGE, Hayden Gilbert DO    0.9% sodium chloride with KCl 20 mEq/L infusion, , IntraVENous, CONTINUOUS, Hayden Gilbert DO, Last Rate: 100 mL/hr at 19 0920    potassium, sodium phosphates (NEUTRA-PHOS) packet 2 Packet, 2 Packet, Oral, QID, Ko, Hyunsun D, NP, 2 Packet at 19 0855    dilTIAZem (CARDIZEM) IR tablet 30 mg, 30 mg, Oral, AC&HS, Tanvi Victoriaa L, NP, 30 mg at 19 0856    metoprolol tartrate (LOPRESSOR) tablet 12.5 mg, 12.5 mg, Oral, BID, [place], Alexus L, NP, 12.5 mg at 19 0856    hydrALAZINE (APRESOLINE) 20 mg/mL injection 10 mg, 10 mg, IntraVENous, Q6H PRN, Alexus Guillermo NP, 10 mg at 19 0603    acetaminophen (TYLENOL) tablet 650 mg, 650 mg, Oral, Q6H PRN, Teja Crespo MD, 650 mg at 03/15/19 1735    sodium chloride (NS) flush 5-10 mL, 5-10 mL, IntraVENous, PRN, Sveta Reich MD    levalbuterol Darius Soriano) nebulizer soln 0.63 mg/3 mL, 0.63 mg, Nebulization, Q6H PRN, Teja Crespo MD    ipratropium (ATROVENT) 0.02 % nebulizer solution 0.5 mg, 0.5 mg, Nebulization, Q6H PRN, Teja Crespo MD      Objective:     Vital Signs: Telemetry:    AFIB Intake/Output:   Visit Vitals  BP (!) 106/34 (BP 1 Location: Left arm, BP Patient Position: Lying right side)   Pulse (!) 51   Temp 98 °F (36.7 °C)   Resp 16   Ht 5' 6\" (1.676 m)   Wt 45.4 kg (100 lb)   SpO2 94%   BMI 16.14 kg/m²       Temp (24hrs), Av.2 °F (36.8 °C), Min:97.6 °F (36.4 °C), Max:98.8 °F (37.1 °C)        O2 Device: Endotracheal tube           Body mass index is 16.14 kg/m².     Wt Readings from Last 4 Encounters:   19 45.4 kg (100 lb)   10/26/18 68 kg (150 lb)   18 68 kg (150 lb)   11 68 kg (150 lb)          Intake/Output Summary (Last 24 hours) at 3/17/2019 1239  Last data filed at 3/17/2019 0900  Gross per 24 hour   Intake 3716.69 ml   Output 570 ml   Net 3146.69 ml       Last shift:       07 -  1900  In: 300 [I.V.:300]  Out: 110 [Urine:110]  Last 3 shifts: 03/15 1901 -  0700  In: 7451.6 [P.O.:60; I.V.:5651.6]  Out: 4616 [Urine:1498]       Hemodynamics:    CO:    CI:    CVP:    SVR:   PAP Systolic:    PAP Diastolic:    PVR:    PG41:        Ventilator Settings:      Mode Rate TV Press PEEP FiO2 PIP Min. Vent   Assist control    400 ml 6 cm H2O  6 cm H20 40 %  23 cm H2O  6.66 l/min      Physical Exam:     General:  female; appears dehydrated and severely ill;  on vent;    HEAD: atraumatic   EYES: conjunctivae clear. PERRL, AN Icteric sclerae   NOSE: nares normal, no drainage, no nasal flaring,    THROAT: intubated; mucous membranes dry; Lips, mucosa dry;      Neck: Supple, symmetrical, trachea midline,  No accessory mm use; No Stridor/ cuff leak, No goiter or thyroid tenderness   LYMPH: No abnormally enlarged lymph nodes. in neck    Chest: normal   Lungs: rhonchi   Heart: IRRegular rate and rhythm; NO edema   Abdomen: soft, nontender   : krueger; clear urine; NO gross hematuria   Extremity: negative, clubbing; no joint swelling or erythema   Neuro: sedated; non verbal; withdraws to pain; unable to check gait and station; NOT following simple commands   Psych: Unable to assess;   No agitation;    Devices:per sepsis flow sheet- reviewed with team during IDR   Skin: Warm;    Pulses:Bilateral, Radial, 1+   Capillary refill: abnormal: ; sluggish capillary refill,      Labs:    Recent Labs     03/17/19  0443 03/16/19  0817 03/15/19  0129   WBC 7.9 14.1* 12.7*   HGB 8.9* 10.3* 11.1*    165 159     Recent Labs     03/17/19  0443 03/16/19  1832 03/16/19  1349 03/16/19  0817 03/16/19  0622 03/15/19  0129    140 140  --  142 143   K 3.2* 3.5 3.7  --  2.7* 3.0*   * 111* 109*  --  112* 111*   CO2 20* 20* 21  --  19* 24   GLU 82 102* 134*  --  89 86   BUN 10 11 10  --  9 15   CREA 0.30* 0.31* 0.37*  --  0.40* 0.54*   CA 7.4* 7.5* 7.7*  --  8.1* 8.0*   MG 2.0  --   --  2.7* 1.7 1.8   PHOS 2.6  --   --   --   --  1.2*   ALB 1.7*  --   --   --   --   --    SGOT 29  --   --   --   --   --    ALT 19  --   --   --   --   --      No results for input(s): PH, PCO2, PO2, HCO3, FIO2 in the last 72 hours. Recent Labs     03/16/19  0817 03/16/19  0622 03/15/19  0129 03/14/19  1633   CPK 95  --   --  50   CKNDX 3.3*  --   --  3.8*   TROIQ 0.29* 0.18* <0.05 <0.05     No results found for: BNPP, BNP   Lab Results   Component Value Date/Time    Culture result: PENDING 03/16/2019 10:27 AM    Culture result: KLEBSIELLA PNEUMONIAE (A) 03/14/2019 09:01 AM    Culture result: NO GROWTH 3 DAYS 03/14/2019 08:40 AM      Lab Results   Component Value Date/Time    CK 95 03/16/2019 08:17 AM    CK 50 03/14/2019 04:33 PM       Imaging:  I have personally reviewed the patients radiographs and have reviewed the reports:    CXR Results  (Last 48 hours)               03/17/19 1023  XR CHEST PORT Final result    Impression:  IMPRESSION:       No right-sided pneumothorax. Persistent consolidation throughout the right lung with volume loss. Narrative:  INDICATION: Possible pneumothorax        COMPARISON: 3/17/2019 at 424       Findings:       Portable AP semiupright view of the chest is obtained. The left lung remains clear. There is volume loss and diffuse areas of airspace   process throughout the right lung. Skin folds overlie the right hemithorax. There is a PICC line on the right now unchanged, tip at the superior vena   cava-right atrial junction. No pneumothorax. ET tube is in satisfactory   position. . NG tube is noted. Heart and mediastinal shadows are stable. Annamary Ripa 03/17/19 0517  XR CHEST PORT Final result    Impression:  IMPRESSION:   Persistent consolidation throughout the right lung with volume loss. Narrative:  INDICATION: Tube and line placement. Intubation       COMPARISON: previous day's exam       A single frontal view was obtained. The time of this study is 0426 hours. The   left lung remains clear. There is volume loss and diffuse areas of airspace   process throughout the right lung. Skin folds overlie the right hemithorax.    There is a PICC line on the right now identified with the tip at the superior   vena cava-right atrial junction. No pneumothorax. ET tube is in satisfactory   position. . NG tube is noted. Heart and mediastinal shadows are stable. [place] 03/16/19 0739  XR CHEST PORT Final result    Impression:  Impression: No change in lung fields following ET tube placement. Narrative: Indication: Status post code, intubation       Comparison to 3/14/2019. Portable exam obtained at 719 demonstrates placement of   an ET tube, projecting in satisfactory position. Infiltrate involving the entire   right lung is unchanged. Results from Hospital Encounter encounter on 03/14/19   XR CHEST PORT    Narrative INDICATION: Possible pneumothorax     COMPARISON: 3/17/2019 at 424    Findings:    Portable AP semiupright view of the chest is obtained. The left lung remains clear. There is volume loss and diffuse areas of airspace  process throughout the right lung. Skin folds overlie the right hemithorax. There is a PICC line on the right now unchanged, tip at the superior vena  cava-right atrial junction. No pneumothorax. ET tube is in satisfactory  position. . NG tube is noted. Heart and mediastinal shadows are stable. .           Impression IMPRESSION:    No right-sided pneumothorax. Persistent consolidation throughout the right lung with volume loss. XR CHEST PORT    Narrative INDICATION: Tube and line placement. Intubation    COMPARISON: previous day's exam    A single frontal view was obtained. The time of this study is 0426 hours. The  left lung remains clear. There is volume loss and diffuse areas of airspace  process throughout the right lung. Skin folds overlie the right hemithorax. There is a PICC line on the right now identified with the tip at the superior  vena cava-right atrial junction. No pneumothorax. ET tube is in satisfactory  position. . NG tube is noted. Heart and mediastinal shadows are stable. [place] Impression IMPRESSION:  Persistent consolidation throughout the right lung with volume loss. XR ABD (KUB)    Narrative CLINICAL HISTORY: NG tube placement    Comparison to 10/26/2018    Single KUB demonstrates a normal bowel gas pattern. NG tube has been placed with  the tip projecting over the antrum/pyloric region. Impression IMPRESSION: NG tube as above. Normal bowel gas pattern. Results from [place] encounter on 03/14/19   CT ABD PELV W CONT    Narrative EXAM: CT ABD PELV W CONT    INDICATION: Abdominal pain; abd pain, fever, vomiting, diarrhea, AMS    COMPARISON:    CONTRAST: 100 mL of Isovue-370. TECHNIQUE:   Following the uneventful intravenous administration of contrast, thin axial  images were obtained through the abdomen and pelvis. Coronal and sagittal  reconstructions were generated. Oral contrast was not administered. CT dose  reduction was achieved through use of a standardized protocol tailored for this  examination and automatic exposure control for dose modulation. FINDINGS:   LUNG BASES: There is airspace disease in the right lung consistent with  pneumonia. INCIDENTALLY IMAGED HEART AND MEDIASTINUM: There is cardiomegaly  LIVER: No mass or biliary dilatation. GALLBLADDER: Unremarkable. SPLEEN: No mass. PANCREAS: No mass or ductal dilatation. There is a 7 mm cyst in the pancreatic  neck  ADRENALS: Unremarkable. KIDNEYS: No mass, calculus, or hydronephrosis. STOMACH: Unremarkable. There is a right renal cyst  SMALL BOWEL: No dilatation or wall thickening. COLON: There is fecal stasis in the rectum  APPENDIX: Not distended  PERITONEUM: No ascites or pneumoperitoneum. RETROPERITONEUM: No lymphadenopathy or aortic aneurysm. REPRODUCTIVE ORGANS: Patient status post hysterectomy  URINARY BLADDER: No mass or calculus. BONES: No destructive bone lesion.  There is dextroconvex scoliosis  ADDITIONAL COMMENTS: There is a ventral wall hernia containing fat Impression IMPRESSION:    1. There is fecal stasis in the rectum. 2. There is ventral wall hernia containing fat. During this entire length of time the patient's condition was unstable, unpredictable and critically ill in the CCU/ ICU. I was immediately available to the patient whose care required several interactions with nursing, multidisciplinary team members leading to multiple interventions with fluid resuscitation and medication adjustments to optimize respiratory support, hemodynamic treatment, medication changes based on repeat labs results, reviews, exams and assessments. The reason for providing this level of medical care was due to a critical illness that impaired one or more vital organ systems, such that there was a high probability of sudden or life threatening deterioration in the patient's condition. This care involved high complexity medical decision making to treat acute and unstable vital organ system failure, and to prevent further life threatening deterioration of the patients condition. I personally:  · Reviewed the flowsheet and previous days notes  · Reviewed and summarized records or history from previous days note or discussions with staff, family  · Parenteral controlled substances - Reviewed/ Adjusted / Eliseo Foster / Started  · High Risk Drug therapy requiring intensive monitoring for toxicity: eg steroids, pressors, antibiotics  · Reviewed and/or ordered Clinical lab tests  · Reviewed and/or ordered Radiology tests  · Reviewed and/or ordered of Medicine tests  · Independently visualized radiologic Images  · Reviewed the patients ECG / Telemetry  · Reviewed and/or adjusted Ventilator / NiPPV settings  ·  discussed my assessment/management with : Consultants, Nursing, Respiratory Therapy, Family for coordination of care           Thank you for allowing us to participate in the care of this patient. We will follow along with you until they no longer require CCU services.     Timothy ARZATE Tere Murray MD

## 2019-03-17 NOTE — PROGRESS NOTES
Cardiac Electrophysiology Progress Note            [address]  [phone]    3/17/2019 9:08 AM    Admit Date: 3/14/2019    Admit Diagnosis: Sepsis ([address] [A41.9]  UTI (urinary tract infection) [N39.0]  Pneumonia [J18.9]    Subjective:     Jessie Burroughs  Is critically ill - intubated. Responds to stimuli. Family at bedside.      Visit Vitals  /47   Pulse (!) 122   Temp 97.8 °F (36.6 °C)   Resp 16   Ht 5' 6\" (1.676 m)   Wt 100 lb (45.4 kg)   SpO2 99%   BMI 16.14 kg/m²     Current Facility-Administered Medications   Medication Dose Route Frequency    NOREPINephrine (LEVOPHED) 8 mg in 5% dextrose 250mL infusion  2-30 mcg/min IntraVENous TITRATE    propofol (DIPRIVAN) 10 mg/mL injection  0-50 mcg/kg/min IntraVENous TITRATE    cefepime (MAXIPIME) 1 g in 0.9% sodium chloride (MBP/ADV) 50 mL  1 g IntraVENous Q8H    metroNIDAZOLE (FLAGYL) IVPB premix 500 mg  500 mg IntraVENous Q12H    heparin (porcine) pf 300 Units  300 Units InterCATHeter PRN    bacitracin 500 unit/gram packet 1 Packet  1 Packet Topical PRN    vancomycin (VANCOCIN) 750 mg in 0.9% sodium chloride (MBP/ADV) 250 mL  750 mg IntraVENous Q18H    chlorhexidine (PERIDEX) 0.12 % mouthwash 15 mL  15 mL Oral Q12H    mupirocin (BACTROBAN) 2 % ointment   Both Nostrils Q12H    influenza vaccine 2018-19 (6 mos+)(PF) (FLUARIX QUAD/FLULAVAL QUAD) injection 0.5 mL  0.5 mL IntraMUSCular PRIOR TO DISCHARGE    0.9% sodium chloride with KCl 20 mEq/L infusion   IntraVENous CONTINUOUS    potassium, sodium phosphates (NEUTRA-PHOS) packet 2 Packet  2 Packet Oral QID    dilTIAZem (CARDIZEM) IR tablet 30 mg  30 mg Oral AC&HS    metoprolol tartrate (LOPRESSOR) tablet 12.5 mg  12.5 mg Oral BID    hydrALAZINE (APRESOLINE) 20 mg/mL injection 10 mg  10 mg IntraVENous Q6H PRN    acetaminophen (TYLENOL) tablet 650 mg  650 mg Oral Q6H PRN    sodium chloride (NS) flush 5-10 mL  5-10 mL IntraVENous PRN    levalbuterol (Dianne Campo) nebulizer soln 0.63 mg/3 mL  0.63 mg Nebulization Q6H PRN    ipratropium (ATROVENT) 0.02 % nebulizer solution 0.5 mg  0.5 mg Nebulization Q6H PRN         Objective:      Visit Vitals  /47   Pulse (!) 122   Temp 97.8 °F (36.6 °C)   Resp 16   Ht 5' 6\" (1.676 m)   Wt 100 lb (45.4 kg)   SpO2 99%   BMI 16.14 kg/m²       Physical Exam:  Abdomen: soft, non-tender  Extremities: extremities normal  Heart: regular rate and rhythm  Lungs: clear to auscultation bilaterally  Pulses: 2+ and symmetric    Data Review:   Labs:    Recent Labs     03/17/19  0443 03/16/19  0817 03/15/19  0129   WBC 7.9 14.1* 12.7*   HGB 8.9* 10.3* 11.1*   HCT 26.8* 30.4* 33.3*    165 159     Recent Labs     03/17/19  0443 03/16/19  1832 03/16/19  1349 03/16/19  0817 03/16/19  0622 03/15/19  0129  03/14/19  0940    140 140  --  142 143  --  141   K 3.2* 3.5 3.7  --  2.7* 3.0*  --  3.0*   * 111* 109*  --  112* 111*  --  106   CO2 20* 20* 21  --  19* 24  --  28   GLU 82 102* 134*  --  89 86  --  107*   BUN 10 11 10  --  9 15  --  25*   CREA 0.30* 0.31* 0.37*  --  0.40* 0.54*  --  0.81   CA 7.4* 7.5* 7.7*  --  8.1* 8.0*  --  8.6   MG 2.0  --   --  2.7* 1.7 1.8   < >  --    PHOS 2.6  --   --   --   --  1.2*  --   --    ALB 1.7*  --   --   --   --   --   --  2.5*   TBILI 1.7*  --   --   --   --   --   --  2.2*   SGOT 29  --   --   --   --   --   --  16   ALT 19  --   --   --   --   --   --  10*    < > = values in this interval not displayed. Recent Labs     03/16/19  0817 03/16/19  0622 03/15/19  0129 03/14/19  1633 03/14/19  0940   TROIQ 0.29* 0.18* <0.05 <0.05 <0.05   CPK 95  --   --  50 47   CKMB 3.1  --   --  1.9 1.3         Intake/Output Summary (Last 24 hours) at 3/17/2019 0908  Last data filed at 3/17/2019 0700  Gross per 24 hour   Intake 5223.36 ml   Output 573 ml   Net 4650.36 ml        Telemetry:sinus - sinus tachycardia  Echo:  · Left ventricular moderately decreased systolic function.  Estimated left ventricular ejection fraction is 36 - 40%. Left ventricular global hypokinesis. · Mild aortic valve regurgitation is present. · Mitral valve non-specific thickening. Mild mitral valve regurgitation. · Mild tricuspid valve regurgitation is present. Mild pulmonary hypertension is present. · Trivial pericardial effusion. Assessment:     Active Problems:    Dementia (8/16/2011)      UTI (urinary tract infection) (3/12/2015)      Pneumonia (3/14/2019)      Sepsis ([facility] (3/14/2019)      Paroxysmal atrial fibrillation ([facility] [address] [facility] (3/15/2019)        Plan:     Jessie Burroughs is HT 89 y.o. female who was admitted with sepsis/PNA/UTI. Cardiology consulted for Afib RVR.   CHADSVASC=6. Pt is s/p cardiac arrest Friday night, currently intubated. WBC and K improving. Echo with ejection fraction is 36 - 40%. Remains in sinus rhythm/sinus tach PACs on cardizem and lopressor. Left ventricular global hypokinesis EF 35-40%. Xray this morning with right pneumothorax. Per RN, will repeat laying supine. Continue with supportive care. GEGE Rosales  Patient seen and examined by me with nurse practitioner. I personally performed all components of the history, physical, and medical decision making and agree with the assessment and plan with minor modifications as noted. Critically ill. Sp arrest. Cont supportive care with bb.      Katina Epstein MD, [facility]        3/17/2019  9:08 AM

## 2019-03-17 NOTE — PROGRESS NOTES
Hospitalist Progress Note    NAME: Tyrone Phelan   :  3/7/1930   MRN:  [id number]       Assessment / Plan:  Code Blue on 3/16/2019(? Unstable Vtach vs Vtach arrest)  Currently in icu and opens eyes. failed sbt on 3/17/2019  Dc'd levaquin,magnesium stable. on Ns wth 20meq on cefepime and flagyl. Currently appears to be in afib with pvc (Qtc 528)  Unable to access rhythm strip at this time as pt was moved from pcu to icu. Sepsis, POA due to  Extensive right sided aspiration pneumonia, POA  UTI, POA  Hypokalemia, POA.  on ns with 20meq/kcl.       Fecal stasis in rectum, POA.  Having diarrhea around fecal stasis.     Dementia - palliative care consulted.     DM     Decubitus buttock ulcer, POA and Sacral redness, POA per ER nurse - wond consulded                less than 18.5 Underweight / Body mass index is 16.14 kg/m². Code status: Full  Prophylaxis: eliquis  Recommended Disposition: SNF/LTC     Subjective:     Chief Complaint / Reason for Physician Visit  \"intubated and sedated\". Discussed with RN events overnight. Review of Systems:  Symptom Y/N Comments  Symptom Y/N Comments   Fever/Chills    Chest Pain     Poor Appetite    Edema     Cough    Abdominal Pain     Sputum    Joint Pain     SOB/COLEMAN    Pruritis/Rash     Nausea/vomit    Tolerating PT/OT     Diarrhea    Tolerating Diet     Constipation    Other       Could NOT obtain due to:      Objective:     VITALS:   Last 24hrs VS reviewed since prior progress note.  Most recent are:  Patient Vitals for the past 24 hrs:   Temp Pulse Resp BP SpO2   19 1200 98 °F (36.7 °C) (!) 51 16 (!) 106/34 94 %   19 1158  (!) 51 16  99 %   19 1130  (!) 48  (!) 96/33    19 1030  (!) 50 16 123/53 99 %   19 1000  (!) 57 16 109/42 97 %   19 0930  (!) 101 18 109/40 97 %   19 0900  (!) 121 18 98/54 99 %   19 0856  (!) 122      19 0845  61 16  99 %   19 0830  (!) 51 16 112/47 99 %   19 0800 97.8 °F (36.6 °C) (!) 52 18 115/47 99 %   03/17/19 0730  (!) 54 17 118/45 99 %   03/17/19 0700  (!) 57 22 118/54 99 %   03/17/19 0630  (!) 56 21 125/44 99 %   03/17/19 0603  66  181/84    03/17/19 0538  61 22  100 %   03/17/19 0530  60 20 156/84 100 %   03/17/19 0523  60 24  100 %   03/17/19 0500  (!) 44 16 138/48 100 %   03/17/19 0455  (!) 45 16  100 %   03/17/19 0430 97.6 °F (36.4 °C) (!) 49 16 (!) 126/36 99 %   03/17/19 0400  (!) 49 16 (!) 98/32 96 %   03/17/19 0230  (!) 49 16 139/53 99 %   03/17/19 0200  (!) 54 17 147/65 99 %   03/17/19 0130  (!) 51 16 134/51 100 %   03/17/19 0100  (!) 51 16 156/51 99 %   03/17/19 0030 98 °F (36.7 °C) (!) 59 16 149/60 97 %   03/16/19 2346  (!) 54 16  98 %   03/16/19 2300  (!) 51 16 120/47 100 %   03/16/19 2230  64 17 161/67 98 %   03/16/19 2200  65 22 163/73 98 %   03/16/19 2100  64 21 156/63 97 %   03/16/19 2000 98.8 °F (37.1 °C) (!) 50 16 127/51 100 %   03/16/19 1920  61 17  100 %   03/16/19 1900  (!) 53 16 111/41 98 %   03/16/19 1800  (!) 56 16 129/47 98 %   03/16/19 1730  (!) 54 17 129/54 97 %   03/16/19 1700  (!) 54 16 120/49 97 %   03/16/19 1630  (!) 54 18 128/50 98 %   03/16/19 1607  (!) 56 16  97 %   03/16/19 1600  (!) 55 16 116/50 98 %   03/16/19 1530  (!) 53 16 115/48 98 %   03/16/19 1500  60 16 142/53 98 %   03/16/19 1430  62 19 122/41 97 %   03/16/19 1400 98.8 °F (37.1 °C) 61 (!) 0 112/43 96 %   03/16/19 1330  (!) 58 23 119/53 97 %   03/16/19 1300  (!) 54 21 139/52 97 %       Intake/Output Summary (Last 24 hours) at 3/17/2019 1251  Last data filed at 3/17/2019 0900  Gross per 24 hour   Intake 3716.69 ml   Output 570 ml   Net 3146.69 ml        PHYSICAL EXAM:  General: Intubated and sedated  EENT:  EOMI. Anicteric sclerae. MMM  Resp:  CTA bilaterally, no wheezing or rales. No accessory muscle use  CV:  Regular  rhythm,  No edema  GI:  Soft, Non distended, Non tender.  +Bowel sounds    Skin:  No rashes.   No jaundice    Reviewed most current lab test results and cultures  YES  Reviewed most current radiology test results   YES  Review and summation of old records today    NO  Reviewed patient's current orders and MAR    YES  PMH/SH reviewed - no change compared to H&P  ________________________________________________________________________  Care Plan discussed with:    Comments   Patient     Family      RN     Care Manager     Consultant                        Multidiciplinary team rounds were held today with , nursing, pharmacist and clinical coordinator. Patient's plan of care was discussed; medications were reviewed and discharge planning was addressed. ________________________________________________________________________  Total NON critical care TIME:  30   Minutes    Total CRITICAL CARE TIME Spent:   Minutes non procedure based      Comments   >50% of visit spent in counseling and coordination of care     ________________________________________________________________________  Coral Ally, DO     Procedures: see electronic medical records for all procedures/Xrays and details which were not copied into this note but were reviewed prior to creation of Plan. LABS:  I reviewed today's most current labs and imaging studies.   Pertinent labs include:  Recent Labs     03/17/19  0443 03/16/19  0817 03/15/19  0129   WBC 7.9 14.1* 12.7*   HGB 8.9* 10.3* 11.1*   HCT 26.8* 30.4* 33.3*    165 159     Recent Labs     03/17/19  0443 03/16/19  1832 03/16/19  1349 03/16/19  0817 03/16/19  0622 03/15/19  0129    140 140  --  142 143   K 3.2* 3.5 3.7  --  2.7* 3.0*   * 111* 109*  --  112* 111*   CO2 20* 20* 21  --  19* 24   GLU 82 102* 134*  --  89 86   BUN 10 11 10  --  9 15   CREA 0.30* 0.31* 0.37*  --  0.40* 0.54*   CA 7.4* 7.5* 7.7*  --  8.1* 8.0*   MG 2.0  --   --  2.7* 1.7 1.8   PHOS 2.6  --   --   --   --  1.2*   ALB 1.7*  --   --   --   --   --    TBILI 1.7*  --   --   --   --   --    SGOT 29  -- --   --   --   --    ALT 19  --   --   --   --   --        Signed: Cindy Trotter DO

## 2019-03-18 PROBLEM — I46.9 CARDIAC ARREST (HCC): Status: ACTIVE | Noted: 2019-01-01

## 2019-03-18 PROBLEM — Z71.89 COUNSELING REGARDING ADVANCED CARE PLANNING AND GOALS OF CARE: Status: ACTIVE | Noted: 2019-01-01

## 2019-03-18 PROBLEM — I47.20 V TACH: Status: ACTIVE | Noted: 2019-01-01

## 2019-03-18 PROBLEM — R13.12 OROPHARYNGEAL DYSPHAGIA: Status: ACTIVE | Noted: 2019-01-01

## 2019-03-18 NOTE — PROGRESS NOTES
CM had a brief conversation with patient's daughter-in-law Friday. Patient has been living in their home for 12+ years with advanced dementia and requires total care. D-I-L stated she does have an advanced directive that she thought was listed in her medical record. Will have further discussion with son regarding transition of care and AD.       Yesika Carias RN, CHPN, [facility]

## 2019-03-18 NOTE — CONSULTS
Palliative Medicine Consult  [place]: [phone] ([phone])    Patient Name: Tyrone Phelan  YOB: 1930    Date of Initial Consult: 3/18/19  Reason for Consult: care decisions  Requesting Provider: Shania Woodruff MD  Primary Care Physician: Carol Acevedo MD     SUMMARY:   Tyrone Phelan is a 80 y.o. with a past history of dementia, TIA, and borderline diabetes, who was admitted on 3/14/2019 from home with a diagnosis of UTI. Current medical issues leading to Palliative Medicine involvement include: elderly, frail, advance dementia. Psychosocial: lives with son and KELSEA Griffith, who cares for pt. Pt is total care, stopped feeding self recently, past 2 weeks forgets food is in mouth, coughing while eating/drinking. PALLIATIVE DIAGNOSES:   1. Abdominal pain  2. PNA right lung  3. Clinical sepsis  4. Ventral hernia   5. Vomiting   6. Diarrhea  7. UTI: klebsiella PNA  8. Sepsis   9. Fecal stasis with diarrhea around fecal stasis  10. Dementia    11. Oropharyngeal dysphagia   12. ACP   PLAN:   1. Prior to visit, I completed an extensive review of patient's medical records, including medical documentation, vital signs, MARs, and results of various labs and other diagnostics. I also spoke with patient's nurse, [place] RN and pulmonologist/intensivist Dr. Gabe Santiago. 2. 12:30-1:00pm: Met with dtr Nayan Estrada, and grandchildren Juaquin Stren:  introduced Palliative Medicine as a support for pt and families dealing with life limiting disease, to help with symptom management, education and understanding disease process and treatment options, and to discuss goals of care, what pt wants in terms of treatment as well as code status. Talked about pt's QOL at home, recent overall decline 2/2 dementia, not feeding self, pocketing food, probable aspiration. Discussed hopes of extubation soon but needing to decide if we would reintubate if pt did not do well.   Family states that pt's sons would need to be part of the discussion, and they will reach out to them and get back to me. 3. 2:15-2:40pm: met with son Justina Collins: again discussed pt's QOL at home, recent overall decline 2/2 dementia, not feeding self, pocketing food, probable aspiration. Discussed hopes of extubation soon but needing to decide if we would reintubate if pt did not do well. He states that they would NOT want reintubation. Family has a good understanding of pt's condition, is hopeful she will recover and return home with limited medical interventions. Discussed dysphagia and tube feedings which are rarely done anymore for this condition, that feeding tubes do not improve QOL. 4. Palliative contact info provided to family. 5. Pink sheet updated: NO REINTUBATION, NO CHEST COMPRESSION OR SHOCK. Vasopressors ok pre-arrest.   6. Initial consult note routed to primary continuity provider and/or primary health care team members  7. Communicated plan of care with: Palliative [facility] [address] Team     GOALS OF CARE / TREATMENT PREFERENCES:     GOALS OF CARE:  Patient/Health Care Proxy Stated Goals: Prolong life    TREATMENT PREFERENCES:   Code Status: Partial Code    Advance Care Planning:  [x] [facility] Interdisciplinary Team has updated the ACP Navigator with Health Care Decision Maker and Patient Capacity      Primary Decision MakerEdsolange Nyasia Child - [phone]  Advance Care Planning 3/18/2019   Patient's Healthcare Decision Maker is: Legal Next of Kin   Confirm Advance Directive None   Patient Would Like to Complete Advance Directive Unable       Medical Interventions: Limited additional interventions     Other Instructions: Other:    As far as possible, the palliative care team has discussed with patient / health care proxy about goals of care / treatment preferences for patient.      HISTORY:     History obtained from: chart, family    CHIEF COMPLAINT: abdominal pain, poor intake    HPI/SUBJECTIVE:    The patient is:   [] Verbal and participatory  [x] Non-participatory due to: dementia    3/14: BIBA with c/o decreased oral intake, vomiting, diarrhea with suspected abdominal pain for the past 2-3 days; pt has been bent over and holding her abdomen with suspected pain. Pt is nonverbal, incontinent and nonambulatory at baseline. Abnormal labs: wbc 15.0, UA consistent with UTI, UC sent, lipase 25, K 3.0, bili 2.2. ABD/PELV CT: fecal stasis in rectum, ventral wall hernia containing fat. CXR: diffuse airspace dz throughout the right lung consistent with PNA. Pt was admitted for routine progression of care. 3/15: RRT called for HR>120. Controlled with dilt gtt. Speech eval revealed mod to severe oral and mild to mod pharyngeal dysphagia, pocketing meds crushed in applesauce. 3/16: sustained v-tach, then pulseless, CPR and code blue called. Intubated, ROSC. 3/18: family in, opens eyes but does not follow commands (baseline).   Pt did not open eyes for me this am.      Clinical Pain Assessment (nonverbal scale for severity on nonverbal patients):   Clinical Pain Assessment  Severity: 0     Activity (Movement): Lying quietly, normal position    Duration: for how long has pt been experiencing pain (e.g., 2 days, 1 month, years)  Frequency: how often pain is an issue (e.g., several times per day, once every few days, constant)     FUNCTIONAL ASSESSMENT:     Palliative Performance Scale (PPS):  PPS: 10       PSYCHOSOCIAL/SPIRITUAL SCREENING:     Palliative IDT has assessed this patient for cultural preferences / practices and a referral made as appropriate to needs (Cultural Services, Patient Advocacy, Ethics, etc.)    Any spiritual / Hindu concerns:  [] Yes /  [x] No    Caregiver Burnout:  [] Yes /  [x] No /  [] No Caregiver Present      Anticipatory grief assessment:   [x] Normal  / [] Maladaptive       ESAS Anxiety:   unable to assess due to pt factors  ESAS Depression:   unable to assess due to pt factors       REVIEW OF SYSTEMS:     Positive and pertinent negative findings in ROS are noted above in HPI. The following systems were [x] reviewed / [] unable to be reviewed as noted in HPI  Other findings are noted below. Systems: constitutional, ears/nose/mouth/throat, respiratory, gastrointestinal, genitourinary, musculoskeletal, integumentary, neurologic, psychiatric, endocrine. Positive findings noted below. Modified ESAS Completed by: provider           Pain: 0           Dyspnea: 0           Stool Occurrence(s): 2        PHYSICAL EXAM:     From RN flowsheet:  Wt Readings from Last 3 Encounters:   03/18/19 110 lb 14.3 oz (50.3 kg)   10/26/18 150 lb (68 kg)   03/14/18 150 lb (68 kg)     Blood pressure 166/70, pulse 86, temperature 98.3 °F (36.8 °C), resp. rate 19, height 5' 6\" (1.676 m), weight 110 lb 14.3 oz (50.3 kg), SpO2 98 %.     Pain Scale 1: Behavioral Pain Scale (BPS)  Pain Intensity 1: 3                 Last bowel movement, if known:     Constitutional: elderly, frail, thin, NAD, unresponsive to verbal command  Eyes: pupils equal, anicteric  ENMT: no nasal discharge, moist mucous membranes  Cardiovascular: regular rhythm, distal pulses intact  Respiratory: breathing not labored, symmetric  Gastrointestinal: soft non-tender, +bowel sounds  Musculoskeletal: no deformity, no tenderness to palpation  Skin: warm, dry  Neurologic: not following commands, moving all extremities  Psychiatric:unable to assess due to pt factors     HISTORY:     Active Problems:    Dementia (8/16/2011)      UTI (urinary tract infection) (3/12/2015)      Pneumonia (3/14/2019)      Sepsis ([address] (3/14/2019)      Paroxysmal atrial fibrillation ([address] (3/15/2019)      Cardiac arrest ([address] (3/16/2019)      V tach ([address] (3/16/2019)      Past Medical History:   Diagnosis Date    Alzheimer's dementia     Dementia     Diabetes ([address]     Hypertension     Stroke ([address] 2011    TIA      Past Surgical History: Procedure Laterality Date    HX GYN      hysterectomy      History reviewed. No pertinent family history. History reviewed, no pertinent family history.   Social History     Tobacco Use    Smoking status: Never Smoker    Smokeless tobacco: Never Used   Substance Use Topics    Alcohol use: No     Allergies   Allergen Reactions    Aspirin Other (comments)     Daugher states no allergy    Pcn [Penicillins] Nausea and Vomiting      Current Facility-Administered Medications   Medication Dose Route Frequency    cefepime (MAXIPIME) 1 g in 0.9% sodium chloride (MBP/ADV) 50 mL  1 g IntraVENous Q12H    enoxaparin (LOVENOX) injection 50 mg  1 mg/kg SubCUTAneous Q24H    dextrose (D50W) injection syrg 25 g  25 g IntraVENous PRN    propofol (DIPRIVAN) 10 mg/mL injection  0-50 mcg/kg/min IntraVENous TITRATE    metroNIDAZOLE (FLAGYL) IVPB premix 500 mg  500 mg IntraVENous Q12H    heparin (porcine) pf 300 Units  300 Units InterCATHeter PRN    bacitracin 500 unit/gram packet 1 Packet  1 Packet Topical PRN    chlorhexidine (PERIDEX) 0.12 % mouthwash 15 mL  15 mL Oral Q12H    mupirocin (BACTROBAN) 2 % ointment   Both Nostrils Q12H    influenza vaccine 2018-19 (6 mos+)(PF) (FLUARIX QUAD/FLULAVAL QUAD) injection 0.5 mL  0.5 mL IntraMUSCular PRIOR TO DISCHARGE    0.9% sodium chloride with KCl 20 mEq/L infusion   IntraVENous CONTINUOUS    potassium, sodium phosphates (NEUTRA-PHOS) packet 2 Packet  2 Packet Oral QID    dilTIAZem (CARDIZEM) IR tablet 30 mg  30 mg Oral AC&HS    metoprolol tartrate (LOPRESSOR) tablet 12.5 mg  12.5 mg Oral BID    hydrALAZINE (APRESOLINE) 20 mg/mL injection 10 mg  10 mg IntraVENous Q6H PRN    acetaminophen (TYLENOL) tablet 650 mg  650 mg Oral Q6H PRN    sodium chloride (NS) flush 5-10 mL  5-10 mL IntraVENous PRN    levalbuterol (XOPENEX) nebulizer soln 0.63 mg/3 mL  0.63 mg Nebulization Q6H PRN    ipratropium (ATROVENT) 0.02 % nebulizer solution 0.5 mg  0.5 mg Nebulization Q6H PRN LAB AND IMAGING FINDINGS:     Lab Results   Component Value Date/Time    WBC 7.2 03/18/2019 04:21 AM    HGB 8.9 (L) 03/18/2019 04:21 AM    PLATELET 969 39/14/5062 04:21 AM     Lab Results   Component Value Date/Time    Sodium 147 (H) 03/18/2019 04:15 AM    Potassium 3.6 03/18/2019 04:15 AM    Chloride 118 (H) 03/18/2019 04:15 AM    CO2 20 (L) 03/18/2019 04:15 AM    BUN 9 03/18/2019 04:15 AM    Creatinine 0.43 (L) 03/18/2019 04:15 AM    Calcium 7.5 (L) 03/18/2019 04:15 AM    Magnesium 1.8 03/18/2019 04:15 AM    Phosphorus 3.2 03/18/2019 04:15 AM      Lab Results   Component Value Date/Time    AST (SGOT) 20 03/18/2019 04:15 AM    Alk. phosphatase 48 03/18/2019 04:15 AM    Protein, total 5.2 (L) 03/18/2019 04:15 AM    Albumin 1.6 (L) 03/18/2019 04:15 AM    Globulin 3.6 03/18/2019 04:15 AM     Lab Results   Component Value Date/Time    INR 1.0 10/26/2018 04:21 PM    Prothrombin time 10.9 10/26/2018 04:21 PM    aPTT 29.4 08/16/2011 08:30 AM      No results found for: IRON, FE, TIBC, IBCT, PSAT, FERR   No results found for: PH, PCO2, PO2  No components found for: [place]   Lab Results   Component Value Date/Time    CK 95 03/16/2019 08:17 AM    CK - MB 3.1 03/16/2019 08:17 AM                Total time: 80  Counseling / coordination time, spent as noted above: 70  > 50% counseling / coordination?: y    Prolonged service was provided for  []30 min   []75 min in face to face time in the presence of the patient, spent as noted above. Time Start:   Time End:   Note: this can only be billed with 15228 (initial) or 35194 (follow up). If multiple start / stop times, list each separately.

## 2019-03-18 NOTE — PROGRESS NOTES
1910 Bedside and Verbal shift change report given to Chrissy  (oncoming nurse) by [place] (offgoing nurse). Report included the following information SBAR, Kardex, Intake/Output, MAR, Recent Results and Cardiac Rhythm SB 52. Received on Diprivan drip 40 mcg/kg/min. RASS -2.    12 am HYPOGLYCEMIC EPISODE DOCUMENTATION    Patient with hypoglycemic episode(s) at 2320 (time) on 03/17/2019(date). BG value(s) pre-treatment 76    Was patient symptomatic? [] yes, [x] no  Patient was treated with the following rescue medications/treatments: [x] D50 post treatment level 128             Name of MD notified: Dr Ish Arango  The following orders were received: PRN D50     4 am no changes with full assessment. Copius secretions by ETT. 5 am Diprivan rate changed to 10 mcg/kg/min to keep calm during SAT/SBT. Open eyes. Does not follow commands. 6 am labs noted. No critical result. 7 am bedside shift report given to [place].

## 2019-03-18 NOTE — PROGRESS NOTES
Pharmacy Automatic Renal Dosing Protocol - Antimicrobials    Indication for Antimicrobials: Aspiration PNA vs HAP; UTI    Current Regimen of Each Antimicrobial:  Cefepime 1 gram IV every 8 hours (Started 3/16/19; Day #3 of 5)  Metronidazole 500 mg IV every 12 hours (Started 3/16/19; Day #3 of 5)    Previous Antimicrobials:   Levofloxacin 750 mg every 48 H; Started 3/14/19, Day #2  Metronidazole 500 mg Q8H; Started 3/14/19, Day #2  Zosyn 3.375 g IV Q8H (Start 3/16/19 - Day 1)  Vancomycin 750 mg IV Q16H -start 3/16/19 - day 2    Significant Cultures:   3/16/19 Respiratory culture = Moderate normal respiratory phyllis (Results pending)  3/14/19 Urine culture = Greater than 100K CFU/mL Klebsiella (FINAL)  3/14/19 Blood culture = No growth x 4 days (Results pending)  3/14/19 Influenza = Negative (FINAL)    Labs:  Recent Labs     19  0421 19  0415 19  0443 19  1832  19  0817   CREA  --  0.43* 0.30* 0.31*   < >  --    BUN  --  9 10 11   < >  --    WBC 7.2  --  7.9  --   --  14.1*    < > = values in this interval not displayed. Temp (24hrs), Av.1 °F (36.7 °C), Min:97.5 °F (36.4 °C), Max:98.7 °F (37.1 °C)    Creatinine Clearance:  Greater than 30 mL/min    Impression/Plan:   · Cefepime dose adjusted to 1 gram IV every 12 hours since CrCl is between 30-60 mL/min. · Metronidazole dosed appropriately based on indication. Continue current regimen. · Antimicrobial stop date: Cefepime and metronidazole with 5 day durations entered. Pharmacy will follow daily and adjust medications as appropriate for renal function and/or serum levels.     Thank you,  Barbie Cormier, PHARMD

## 2019-03-18 NOTE — PROGRESS NOTES
03/18/19 0522   Weaning Parameters   Spontaneous Breathing Trial Complete Yes   Resp Rate Observed 26   Ve 10.1      RSBI 70     SBT passed, patient back on A/C mode

## 2019-03-18 NOTE — INTERDISCIPLINARY ROUNDS
Interdisciplinary team rounds were held 3/18/2019 with the following team members:Care Management, Diabetes Treatment Specialist, Nursing, Nutrition, Pharmacy, Physician and Respiratory Therapy. Plan of care discussed. See clinical pathway and/or care plan for interventions and desired outcomes.

## 2019-03-18 NOTE — PROGRESS NOTES
[address]  [phone]      Cardiology Progress Note      3/18/2019 1030 AM    Admit Date: 3/14/2019    Admit Diagnosis:   Sepsis ([address] [A41.9]  UTI (urinary tract infection) [N39.0]  Pneumonia [J18.9]    Subjective:     Jasen Olson is a 80 y.o. female with PMH HTN, DM, TIA, dementia who was admitted for Sepsis ([address] [A41.9]  UTI (urinary tract infection) [N39.0]  Pneumonia [J18.9].      Overnight events:  -s/p arrest over weekend on 3/16  -jerry at times  -labs steady  -weight same I/O + 1.6L  -Ms. Trinidad intubated and sedated. Multiple visitors at bedside.      Visit Vitals  /64   Pulse 65   Temp 98.1 °F (36.7 °C)   Resp 27   Ht 5' 6\" (1.676 m)   Wt 50.3 kg (110 lb 14.3 oz)   SpO2 99%   BMI 17.90 kg/m²       Current Facility-Administered Medications   Medication Dose Route Frequency    cefepime (MAXIPIME) 1 g in 0.9% sodium chloride (MBP/ADV) 50 mL  1 g IntraVENous Q12H    heparin (porcine) injection 5,000 Units  5,000 Units SubCUTAneous Q12H    dextrose (D50W) injection syrg 25 g  25 g IntraVENous PRN    propofol (DIPRIVAN) 10 mg/mL injection  0-50 mcg/kg/min IntraVENous TITRATE    metroNIDAZOLE (FLAGYL) IVPB premix 500 mg  500 mg IntraVENous Q12H    heparin (porcine) pf 300 Units  300 Units InterCATHeter PRN    bacitracin 500 unit/gram packet 1 Packet  1 Packet Topical PRN    chlorhexidine (PERIDEX) 0.12 % mouthwash 15 mL  15 mL Oral Q12H    mupirocin (BACTROBAN) 2 % ointment   Both Nostrils Q12H    influenza vaccine 2018-19 (6 mos+)(PF) (FLUARIX QUAD/FLULAVAL QUAD) injection 0.5 mL  0.5 mL IntraMUSCular PRIOR TO DISCHARGE    0.9% sodium chloride with KCl 20 mEq/L infusion   IntraVENous CONTINUOUS    potassium, sodium phosphates (NEUTRA-PHOS) packet 2 Packet  2 Packet Oral QID    dilTIAZem (CARDIZEM) IR tablet 30 mg  30 mg Oral AC&HS    metoprolol tartrate (LOPRESSOR) tablet 12.5 mg  12.5 mg Oral BID    hydrALAZINE (APRESOLINE) 20 mg/mL injection 10 mg  10 mg IntraVENous Q6H PRN    acetaminophen (TYLENOL) tablet 650 mg  650 mg Oral Q6H PRN    sodium chloride (NS) flush 5-10 mL  5-10 mL IntraVENous PRN    levalbuterol (XOPENEX) nebulizer soln 0.63 mg/3 mL  0.63 mg Nebulization Q6H PRN    ipratropium (ATROVENT) 0.02 % nebulizer solution 0.5 mg  0.5 mg Nebulization Q6H PRN       Objective:      Physical Exam:  General: intubated and sedated, elderly AAF resting in bed in NAD  Heart: RRR, no murmur  Lungs: clear, dim bases. intubated  Abdomen: Soft, +BS, NTND    Extremities: LE viji +DP/PT, 1+ edema BUE  Neurologic: intubated and sedated; baseline nonverbal   Skin:  Warm and dry.     Data Review:   Recent Labs     03/18/19  0421 03/17/19  0443 03/16/19  0817   WBC 7.2 7.9 14.1*   HGB 8.9* 8.9* 10.3*   HCT 27.1* 26.8* 30.4*    153 165     Recent Labs     03/18/19  0415 03/17/19  0443 03/16/19  1832  03/16/19  0817   * 143 140   < >  --    K 3.6 3.2* 3.5   < >  --    * 114* 111*   < >  --    CO2 20* 20* 20*   < >  --    GLU 82 82 102*   < >  --    BUN 9 10 11   < >  --    CREA 0.43* 0.30* 0.31*   < >  --    CA 7.5* 7.4* 7.5*   < >  --    MG 1.8 2.0  --   --  2.7*   PHOS 3.2 2.6  --   --   --    ALB 1.6* 1.7*  --   --   --    TBILI 1.2* 1.7*  --   --   --    SGOT 20 29  --   --   --    ALT 15 19  --   --   --     < > = values in this interval not displayed. Recent Labs     03/16/19  0817 03/16/19  0622   TROIQ 0.29* 0.18*   CPK 95  --    CKMB 3.1  --          Intake/Output Summary (Last 24 hours) at 3/18/2019 1206  Last data filed at 3/18/2019 0700  Gross per 24 hour   Intake 2329.65 ml   Output 792 ml   Net 1537.65 ml        Telemetry: SR to SB with PVCs  ECG: a fib           Read as 2:1 flutter  Echocardiogram: EF 36-40%; global hypokinesis; mild AR; mild MR; mild TR; mild PHTN;  Trivial pericardial effusion   CXRAY: \"Diffuse airspace disease throughout the right lung consistent with pneumonia.  Follow-up to resolution is suggested. \"           Assessment:     Active Problems:    Dementia (8/16/2011)      UTI (urinary tract infection) (3/12/2015)      Pneumonia (3/14/2019)      Sepsis ([address] (3/14/2019)      Paroxysmal atrial fibrillation ([address] (3/15/2019)      Cardiac arrest ([address] (3/16/2019)      V tach ([address] (3/16/2019)        Plan:     PAF:  Remains in SR with PVCs  · Rate controlled on cardizem and metop.  cardizem not ideal with reduced EF. Can consider amio. · Had been on eliquis prior to code. Unless he has changed his mind, patient's son wanted AC. Sub-q heparin not effective as AC for a fib. Will order lovenox for now. VT arrest:  Morning of 3/17. Now intubated and sedated in ICU. · Continue to monitor and replete electrolytes  · EF 36-40% with global hypokinesis after arrest - if EF low 2/2 arrest and/or sepsis may see recovery  · See that patient is now a partial code. Palliative consult pending. Will need to see if patient's son changes any goals of care after meeting will palliative to help determine ischemic evaluation. End-stage dementia with poor PO intake is not generally appropriate for invasive ischemic eval.         Sepsis/PNA/UTI:  · Per hospitalist and pulmonary      Kaylee Cordova, ERAN  DNP, RN, [facility]      Patient seen and examined by me with nurse practitioner. Tien Rivera personally performed all components of the history, physical, and medical decision making and agree with the assessment and plan with minor modifications as noted. Recommend comfort measures.      Maribel Starkey MD

## 2019-03-18 NOTE — PROGRESS NOTES
Hospitalist Progress Note    NAME: Tamra Carvajal   :  3/7/1930   MRN:  [id number]       Assessment / Plan:    Code Blue on 3/16/2019 Vtach arrest  Acute hypoxemic resp failure, s/p code on the vent  Sepsis, POA 2nd to R asp PNA  UTI, POA klebsiella  Hx pafib  -cont cefepime and flagyl  -passed breathing trails, plans to extubate in am, but no reintubation per palliative care discussion  -cards following, consideration of ischemic eval depending on clinical outcome post extubation, lovenox and bb, ccb  -EF 35-40% on echo  -appreciate [facility] management while pt in ICU    Hypokalemia, POA.        Fecal stasis in rectum, POA.  Having diarrhea around fecal stasis.     Dementia, advanced baseline   - palliative care folowing     DM     Decubitus buttock ulcer, POA and Sacral redness, POA per ER nurse  -wound care        less than 18.5 Underweight / Body mass index is 16.14 kg/m². - Suspect mod to severe malnutrition, alb 1.6      Code status: Full, changed to partial today  Prophylaxis: lovenox  Recommended Disposition: TBD     Subjective:     Chief Complaint / Reason for Physician Visit  Fever, V    Patient on the ventilator past her breathing trials this morning. Family met with palliative care and opted for partial CODE STATUS do not time to reinflate the patient does not do well. Plans are to try to extubate in the morning with family present. Patient was evaluated at 7 PM    Discussed with RN events overnight. Review of Systems:  Symptom Y/N Comments  Symptom Y/N Comments   Fever/Chills    Chest Pain     Poor Appetite    Edema     Cough    Abdominal Pain     Sputum    Joint Pain     SOB/COLEMAN    Pruritis/Rash     Nausea/vomit    Tolerating PT/OT     Diarrhea    Tolerating Diet     Constipation    Other       Could NOT obtain due to: On the vent     Objective:     VITALS:   Last 24hrs VS reviewed since prior progress note.  Most recent are:  Patient Vitals for the past 24 hrs:   Temp Pulse Resp BP SpO2 03/18/19 1911  77 21  100 %   03/18/19 1900  77 21 167/55 100 %   03/18/19 1800  (!) 58 16 126/54 99 %   03/18/19 1737  (!) 57 16  98 %   03/18/19 1700  86 19 166/70 98 %   03/18/19 1600 98.3 °F (36.8 °C) 83 24 162/62 99 %   03/18/19 1500  81 22 170/57 98 %   03/18/19 1400  80 24 159/64 97 %   03/18/19 1325  79 29  99 %   03/18/19 1300  89 27 142/42 99 %   03/18/19 1200 98.1 °F (36.7 °C) 83 22 146/84 99 %   03/18/19 1152 98.1 °F (36.7 °C) 65 27 146/84 99 %   03/18/19 1149  71 29  98 %   03/18/19 1135  74 23 174/72 99 %   03/18/19 1130  71 26 175/64 98 %   03/18/19 1100  69 20 179/74 99 %   03/18/19 1057  70   98 %   03/18/19 1030  63 18 171/60 98 %   03/18/19 1000  69 18 154/84 97 %   03/18/19 0930  65 18 161/69 98 %   03/18/19 0900  60 17 156/74 97 %   03/18/19 0833  67 19  100 %   03/18/19 0830  65 18  100 %   03/18/19 0800 98.4 °F (36.9 °C) (!) 52 16 137/53 100 %   03/18/19 0700  (!) 56 16 (!) 107/37 97 %   03/18/19 0630  (!) 55 16 (!) 156/32 99 %   03/18/19 0522  71 26  99 %   03/18/19 0507  71 29  100 %   03/18/19 0500 97.5 °F (36.4 °C) (!) 52 16 150/41 99 %   03/18/19 0430  (!) 52 16 150/42 99 %   03/18/19 0428  (!) 52 15  99 %   03/18/19 0400  (!) 57 16 150/47 99 %   03/18/19 0330  (!) 59 16 145/51 99 %   03/18/19 0300  (!) 52 16 147/48 99 %   03/18/19 0200  (!) 56 16 142/47 99 %   03/18/19 0130  (!) 51 16 145/41 99 %   03/18/19 0100  (!) 54 16 102/45 98 %   03/18/19 0045  (!) 52 16  98 %   03/18/19 0030 97.7 °F (36.5 °C) (!) 53 16 (!) 115/39 98 %   03/17/19 2330  (!) 59 16 137/40 99 %   03/17/19 2300  (!) 54 16 114/40 98 %   03/17/19 2230  (!) 52 16 128/40 98 %   03/17/19 2200  (!) 52 20 128/40 100 %   03/17/19 2100  67 23  100 %   03/17/19 2000  (!) 52 16 (!) 114/34 99 %       Intake/Output Summary (Last 24 hours) at 3/18/2019 1930  Last data filed at 3/18/2019 1700  Gross per 24 hour   Intake 2232.15 ml   Output 1010 ml   Net 1222.15 ml        PHYSICAL EXAM:  Patient is on the vent she opens her eyes to voice but does not follow commands seem to squeeze my hands slightly but not on command. Conjunctiva pink mucous membranes appear tacky cardiovascular regular rate and obvious murmurs rubs or gallops lungs are coarse bilaterally but no wheezes or crackles abdomen bowel sounds are present soft nontender extremities no clubbing or edema    Reviewed most current lab test results and cultures  YES  Reviewed most current radiology test results   YES  Review and summation of old records today    NO  Reviewed patient's current orders and MAR    YES  PMH/SH reviewed - no change compared to H&P  ________________________________________________________________________  Care Plan discussed with:    Comments   Patient y    Family  y daughter   RN y    Care Manager     Consultant                        Multidiciplinary team rounds were held today with , nursing, pharmacist and clinical coordinator. Patient's plan of care was discussed; medications were reviewed and discharge planning was addressed. ________________________________________________________________________  Total NON critical care TIME:     Minutes    Total CRITICAL CARE TIME Spent:   Minutes non procedure based      Comments   >50% of visit spent in counseling and coordination of care     ________________________________________________________________________  Mallie Kanner, MD     Procedures: see electronic medical records for all procedures/Xrays and details which were not copied into this note but were reviewed prior to creation of Plan. LABS:  I reviewed today's most current labs and imaging studies.   Pertinent labs include:  Recent Labs     03/18/19  0421 03/17/19  0443 03/16/19  0817   WBC 7.2 7.9 14.1*   HGB 8.9* 8.9* 10.3*   HCT 27.1* 26.8* 30.4*    153 165     Recent Labs     03/18/19  0415 03/17/19  0443 03/16/19  1832  03/16/19  0817   * 143 140   < >  --    K 3.6 3. 2* 3.5   < >  --    * 114* 111*   < >  --    CO2 20* 20* 20*   < >  --    GLU 82 82 102*   < >  --    BUN 9 10 11   < >  --    CREA 0.43* 0.30* 0.31*   < >  --    CA 7.5* 7.4* 7.5*   < >  --    MG 1.8 2.0  --   --  2.7*   PHOS 3.2 2.6  --   --   --    ALB 1.6* 1.7*  --   --   --    TBILI 1.2* 1.7*  --   --   --    SGOT 20 29  --   --   --    ALT 15 19  --   --   --     < > = values in this interval not displayed.        Signed: Mallie Kanner, MD

## 2019-03-18 NOTE — ROUTINE PROCESS
Bedside and Verbal shift change report received from [place] (offgoing nurse). Report included the following information SBAR, Kardex, ED Summary, Procedure Summary, Intake/Output, MAR, Accordion, Recent Results, Med Rec Status, Cardiac Rhythm sinus arrhythmia, Alarm Parameters  and Quality Measures. Noted resting with her eyes closed. She is tolerating the mechanical ventilator well without any respiratory distress or facial grimaces. Suctioned for a moderate amount of white thick sputum. Her son, Yaritza Parrish, is in at the bedside. 0900:Family in. Although her eyes are open, she does not track you in the room. 1040:Propofol off, for SAT and SBT. Repositioned with the HOP elevated  1100: CPAP mode in use. Her children, and grand children are at the bedside. 1200:Arousable with stimulation, no respiratory distress noted. [place] care completed, and repositioned for comfort. Lungs are clear. 1300:Resting at long intervals with her eyes closed. Status is unchanged. 1430:Placed back on the previous mechanical ventilator settings  1600:Green care completed, she tolerated it well. Her assessment is unchanged. 1745: in, planning to hold the Eliquis and Lovenox this evening   1930:Bedside and Verbal shift change report given to HALEIGH Kwon RN (oncoming nurse) by myself (offgoing nurse). Report included the following information SBAR, Kardex, ED Summary, Procedure Summary, Intake/Output, MAR, Accordion, Recent Results, Med Rec Status, Cardiac Rhythm sinus rhythm with PAC's, Alarm Parameters , Procedure Verification and Quality Measures. Her family remains at the bedside.

## 2019-03-18 NOTE — PROGRESS NOTES
Speech pathology  Consult received and appreciated, however order entered per protocol, which is a nursing order. Also, note patient still intubated. SLP requires a physician's order to complete swallowing evaluation if desired once extubated. Thanks.      Yu Panchal M.S. CCC-SLP

## 2019-03-19 NOTE — PROGRESS NOTES
0730 Report received from HALEIGH Kwon RN via [employer] [place]. Skin warm and dry to touch eyes open focus and track activity. No spontaneous movement of extrermities noted. Bilateral upper extremities edematous with scattered ecchymosis. Fecal management system intact with green colored liquid stool. Large external hemorrhoid noted with additional skin tag present. Dark tissue  Area un- stageable sacrum foam dressing intact. Suctioned for large amount of oral and eT secretions semi-thick with drooling of white color secretions orally. Note Scope SR with frequent PVC's.  
0800 Daughter in law present verbalizing expectation of tube removal this am at 0900.  
0830 Dr. Abhi Sommer in and inform of above. Palliative care paged 0930 STEPHEN Rodriguez Comes NP notified and in to see. Family gives history of non-verbal secondary to hx. Of dementia. And wheelchair bound. 1000 Extubated to nasal cannula. Congestive sounding cough unable to expectorate phlegm requiring frequent secretions of oral cavity. 1100 family members in.  Pt. Remains non-verbal tracking movement in room. Note at intervals puffing of cheeks. Appears anxious at times with repositioning. Open mouth at times spontaneously for mouth care. 1400 Closing eyes at times easily aroused. Lungs remain coarse. 1600 Liquid green colored drainage in tubing of fecal system Protective cream to external hemorrhoid. Continue nasal cannula @ 4 lpm. Occasionally noted puffing of cheeks resembling purse lip breathing although SAO2 >90 %. n redlief with suctioning of oral cavity. 1800 repositioned with mouth care. Personal items gathered and packed in preparation for transfer. Son Francisco notified of transfer to [address] [place] [address]. 
[facility] REPORT: 
 
Verbal report given to Nelda Montgomery (onc) on [facility]  being transferred to Oncology [address] [place] [address] for routine progression of care Report consisted of patients Situation, Background, Assessment and  
Recommendations(SBAR). Information from the following report(s) SBAR, Kardex, Procedure Summary, Intake/Output, MAR and Cardiac Rhythm sinus freq PVC's was reviewed with the receiving nurse. Lines: PICC Double Lumen 03/16/19 Right;Brachial (Active) Central Line Being Utilized Yes 3/19/2019  8:00 AM  
Criteria for Appropriate Use Limited/no vessel suitable for conventional peripheral access 3/19/2019  8:00 AM  
Site Assessment Clean, dry, & intact 3/19/2019  8:00 AM  
Phlebitis Assessment 0 3/19/2019  8:00 AM  
Infiltration Assessment 0 3/19/2019  8:00 AM  
Arm Circumference (cm) 23 cm 3/18/2019  3:00 PM  
Date of Last Dressing Change 03/16/19 3/19/2019  8:00 AM  
Dressing Status Clean, dry, & intact 3/19/2019  8:00 AM  
Action Taken Open ports on tubing capped 3/19/2019  8:00 AM  
Dressing Type Disk with Chlorhexadine gluconate (CHG) 3/19/2019  5:27 AM  
Hub Color/Line Status Purple;Capped 3/19/2019 10:00 AM  
Positive Blood Return (Site #1) Yes 3/19/2019 10:00 AM  
Hub Color/Line Status Red;Capped 3/19/2019 10:00 AM  
Positive Blood Return (Site #2) Yes 3/19/2019 10:00 AM  
Alcohol Cap Used Yes 3/19/2019  5:27 AM  
   
Peripheral IV 03/16/19 Left Hand (Active) Site Assessment Clean, dry, & intact 3/19/2019  8:00 AM  
Phlebitis Assessment 0 3/19/2019  8:00 AM  
Infiltration Assessment 0 3/19/2019  8:00 AM  
Dressing Status Clean, dry, & intact 3/19/2019  8:00 AM  
Dressing Type Bacteriocidal 3/19/2019  8:00 AM  
Hub Color/Line Status Blue;Capped 3/19/2019  8:00 AM  
  
 
Opportunity for questions and clarification was provided. Patient transported with: 
 O2 @ 4 liters Patient-specific medications from [facility]

## 2019-03-19 NOTE — INTERDISCIPLINARY ROUNDS
Interdisciplinary team rounds were held 3/19/2019 with the following team members:Care Management, Diabetes Treatment Specialist, Nursing, Nutrition, Pharmacy and Physician. Plan of care discussed. See clinical pathway and/or care plan for interventions and desired outcomes.

## 2019-03-19 NOTE — PROGRESS NOTES
PULMONARY ASSOCIATES [facility] INTENSIVIST Consult Service Note  Pulmonary, Critical Care, and Sleep Medicine    Name: Justyna Taylor MRN: [id number]   : 3/7/1930 Hospital: [facility]   Date: 3/19/2019  Admission date: 3/14/2019 Hospital Day: 6       IMPRESSION:   1. Acute Respiratory Failure with Hypoxia  2. Abnormal CXR  3. Severe Sepsis with Shock   4. Extensive right sided aspiration pneumonia  5. Klebsiella UTI  6. Hypokalemia  7. Afib A flutter with RVR  8. Fecal stasis in rectum. 9. HTN   10. Dementia  11. H/o CVA per son  15. DM  13. Wheelchair bound  14. Decubitus buttock ulcer, POA and Sacral redness, POA per ER nurse  15. Additional workup outlined below  16. Multiorgan dysfunction as outlined above: Pt has one or more acute or chronic illnesses with severe exacerbation with progression or side effects of treatment that poses a threat to life or bodily function      RECOMMENDATIONS/PLAN:   1. CCU monitoring  2. adjust sedation  3. No CPR, No defib  4. Extubated per family request  5. Ventilator for mechanical life support and prevent respiratory arrest with protective lung strategies   6. Agree with Empiric IV antibiotics pending culture results   7. Follow culture results  8. cardiology following  9. Transfuse prn to maintain Hgb > 7  10. Glucose monitoring and SSI  11. Replete electrolytes as needed  12. Labs to follow electrolytes, renal function and and blood counts  13. Bronchial hygiene with respiratory therapy techniques, bronchodilators  14. DVT, SUP prophylaxis  15. Will be available to assist in medical management while in the CCU pending disposition  16. DNR         Subjective/Initial History:   I have reviewed the flowsheet and previous days notes. Seen earlier today on rounds. No acute events overnight  Still full vent support  No acute distress   No improvement        ROS:Review of systems not obtained due to patient factors.        MEDS:   Current Facility-Administered Medications   Medication    cefepime (MAXIPIME) 1 g in 0.9% sodium chloride (MBP/ADV) 50 mL    enoxaparin (LOVENOX) injection 50 mg    dextrose (D50W) injection syrg 25 g    propofol (DIPRIVAN) 10 mg/mL injection    metroNIDAZOLE (FLAGYL) IVPB premix 500 mg    heparin (porcine) pf 300 Units    bacitracin 500 unit/gram packet 1 Packet    chlorhexidine (PERIDEX) 0.12 % mouthwash 15 mL    mupirocin (BACTROBAN) 2 % ointment    influenza vaccine 2018-19 (6 mos+)(PF) (FLUARIX QUAD/FLULAVAL QUAD) injection 0.5 mL    0.9% sodium chloride with KCl 20 mEq/L infusion    potassium, sodium phosphates (NEUTRA-PHOS) packet 2 Packet    dilTIAZem (CARDIZEM) IR tablet 30 mg    metoprolol tartrate (LOPRESSOR) tablet 12.5 mg    hydrALAZINE (APRESOLINE) 20 mg/mL injection 10 mg    acetaminophen (TYLENOL) tablet 650 mg    sodium chloride (NS) flush 5-10 mL    levalbuterol (XOPENEX) nebulizer soln 0.63 mg/3 mL    ipratropium (ATROVENT) 0.02 % nebulizer solution 0.5 mg        Current Facility-Administered Medications:     cefepime (MAXIPIME) 1 g in 0.9% sodium chloride (MBP/ADV) 50 mL, 1 g, IntraVENous, Q12H, Julia Schroeder MD, Last Rate: 100 mL/hr at 03/19/19 0117, 1 g at 03/19/19 0117    enoxaparin (LOVENOX) injection 50 mg, 1 mg/kg, SubCUTAneous, Q24H, Alexus Victoria NP, Stopped at 03/18/19 1800    dextrose (D50W) injection syrg 25 g, 25 g, IntraVENous, PRN, Josie Vázquez MD, 12.5 g at 03/19/19 0205    propofol (DIPRIVAN) 10 mg/mL injection, 0-50 mcg/kg/min, IntraVENous, TITRATE, Christina Bobo DO, Last Rate: 1.4 mL/hr at 03/19/19 0527, 5 mcg/kg/min at 03/19/19 0527    metroNIDAZOLE (FLAGYL) IVPB premix 500 mg, 500 mg, IntraVENous, Q12H, Hayden Gilbert DO, Last Rate: 100 mL/hr at 03/18/19 2024, 500 mg at 03/18/19 2024    heparin (porcine) pf 300 Units, 300 Units, InterCATHeter, PRN, Hayden Gilbert DO    bacitracin 500 unit/gram packet 1 Packet, 1 Packet, Topical, PRN, Hayden Gilbert DO    chlorhexidine (PERIDEX) 0.12 % mouthwash 15 mL, 15 mL, Oral, Q12H, Hayden Gilbert DO, 15 mL at 19    mupirocin (BACTROBAN) 2 % ointment, , Both Nostrils, Q12H, Shasta Reynoso MD    influenza vaccine 2018- (6 mos+)(PF) (FLUARIX QUAD/FLULAVAL QUAD) injection 0.5 mL, 0.5 mL, IntraMUSCular, PRIOR TO DISCHARGE, Hayden Gilbert DO    0.9% sodium chloride with KCl 20 mEq/L infusion, , IntraVENous, CONTINUOUS, Neel Zaldivar MD, Last Rate: 75 mL/hr at 19 2321    potassium, sodium phosphates (NEUTRA-PHOS) packet 2 Packet, 2 Packet, Oral, QID, Ko, Kaz D, NP, 2 Packet at 19 212    dilTIAZem (CARDIZEM) IR tablet 30 mg, 30 mg, Oral, AC&HS, Sienna Alexus Nova, NP, 30 mg at 19 2125    metoprolol tartrate (LOPRESSOR) tablet 12.5 mg, 12.5 mg, Oral, BID, EscalanteAlexus schmidt, NP, 12.5 mg at 19 1842    hydrALAZINE (APRESOLINE) 20 mg/mL injection 10 mg, 10 mg, IntraVENous, Q6H PRN, Alexus Kelley NP, 10 mg at 19 0531    acetaminophen (TYLENOL) tablet 650 mg, 650 mg, Oral, Q6H PRN, Yosvany Garcia MD, 650 mg at 03/15/19 1735    sodium chloride (NS) flush 5-10 mL, 5-10 mL, IntraVENous, PRN, Leticia Mayes MD, 10 mL at 19 0500    levalbuterol (XOPENEX) nebulizer soln 0.63 mg/3 mL, 0.63 mg, Nebulization, Q6H PRN, Yosvany Garcia MD    ipratropium (ATROVENT) 0.02 % nebulizer solution 0.5 mg, 0.5 mg, Nebulization, Q6H PRN, Yosvany Garcia MD      Objective:     Vital Signs: Telemetry:    AFIB Intake/Output:   Visit Vitals  /58   Pulse 76   Temp 98.1 °F (36.7 °C)   Resp 19   Ht 5' 6\" (1.676 m)   Wt 53 kg (116 lb 13.5 oz)   SpO2 96%   BMI 18.86 kg/m²       Temp (24hrs), Av.5 °F (36.9 °C), Min:98.1 °F (36.7 °C), Max:99.6 °F (37.6 °C)        O2 Device: Ventilator           Body mass index is 18.86 kg/m².     Wt Readings from Last 4 Encounters:   19 53 kg (116 lb 13.5 oz)   10/26/18 68 kg (150 lb)   18 68 kg (150 lb) 08/16/11 68 kg (150 lb)          Intake/Output Summary (Last 24 hours) at 3/19/2019 0802  Last data filed at 3/19/2019 0700  Gross per 24 hour   Intake 2914.44 ml   Output 1182 ml   Net 1732.44 ml       Last shift:      No intake/output data recorded. Last 3 shifts: 03/17 1901 - 03/19 0700  In: 4091.3 [I.V.:3351.3]  Out: 4507 [Urine:1280; Drains:410]       Hemodynamics:    CO:    CI:    CVP:    SVR:   PAP Systolic:    PAP Diastolic:    PVR:    ND59:        Ventilator Settings:      Mode Rate TV Press PEEP FiO2 PIP Min. Vent   CPAP, Spontaneous    400 ml 6 cm H2O  6 cm H20 35 %  27 cm H2O  10.3 l/min      Physical Exam:     General:  female; severely ill;  on vent;    HEAD: atraumatic   EYES: conjunctivae clear. PERRL, AN Icteric sclerae   NOSE: nares normal, no drainage, no nasal flaring,    THROAT: intubated; mucous membranes dry; Lips, mucosa dry;      Neck: Supple, symmetrical, trachea midline,  No accessory mm use; No Stridor/ cuff leak, No goiter or thyroid tenderness   LYMPH: No abnormally enlarged lymph nodes. in neck    Chest: normal   Lungs: rhonchi   Heart: IRRegular rate and rhythm; NO edema   Abdomen: soft, nontender   : krueger; clear urine; NO gross hematuria   Extremity: negative, clubbing; no joint swelling or erythema   Neuro: sedated; non verbal; withdraws to pain; unable to check gait and station; NOT following simple commands   Psych: Unable to assess;   No agitation;    Devices:per sepsis flow sheet- reviewed with team during IDR   Skin: Warm;    Pulses:Bilateral, Radial, 1+   Capillary refill: abnormal: ; sluggish capillary refill,      Labs:    Recent Labs     03/19/19  0421 03/18/19  0421 03/17/19  0443   WBC 7.7 7.2 7.9   HGB 9.2* 8.9* 8.9*    159 153     Recent Labs     03/19/19  0421 03/18/19  0415 03/17/19  0443  03/16/19  0817   * 147* 143   < >  --    K 3.5 3.6 3.2*   < >  --    * 118* 114*   < >  --    CO2 19* 20* 20*   < >  --    GLU 97 82 82   < > --    BUN 6 9 10   < >  --    CREA 0.37* 0.43* 0.30*   < >  --    CA 7.5* 7.5* 7.4*   < >  --    MG  --  1.8 2.0  --  2.7*   PHOS  --  3.2 2.6  --   --    ALB 1.7* 1.6* 1.7*  --   --    SGOT 18 20 29  --   --    ALT 13 15 19  --   --     < > = values in this interval not displayed. No results for input(s): PH, PCO2, PO2, HCO3, FIO2 in the last 72 hours. Recent Labs     03/16/19  0817   CPK 95   CKNDX 3.3*   TROIQ 0.29*     No results found for: BNPP, BNP   Lab Results   Component Value Date/Time    Culture result: MODERATE NORMAL RESPIRATORY LIZBETH 03/16/2019 10:27 AM    Culture result: KLEBSIELLA PNEUMONIAE (A) 03/14/2019 09:01 AM    Culture result: NO GROWTH 4 DAYS 03/14/2019 08:40 AM      Lab Results   Component Value Date/Time    CK 95 03/16/2019 08:17 AM    CK 50 03/14/2019 04:33 PM       Imaging:  I have personally reviewed the patients radiographs and have reviewed the reports:  CXR: no acute changes, not improved      During this entire length of time the patient's condition was unstable, unpredictable and critically ill in the CCU/ ICU. I was immediately available to the patient whose care required several interactions with nursing, multidisciplinary team members leading to multiple interventions with fluid resuscitation and medication adjustments to optimize respiratory support, hemodynamic treatment, medication changes based on repeat labs results, reviews, exams and assessments. The reason for providing this level of medical care was due to a critical illness that impaired one or more vital organ systems, such that there was a high probability of sudden or life threatening deterioration in the patient's condition. This care involved high complexity medical decision making to treat acute and unstable vital organ system failure, and to prevent further life threatening deterioration of the patients condition.  I personally:  · Reviewed the flowsheet and previous days notes  · Reviewed and summarized records or history from previous days note or discussions with staff, family  · Parenteral controlled substances - Reviewed/ Adjusted / [place] / Started  · High Risk Drug therapy requiring intensive monitoring for toxicity: eg steroids, pressors, antibiotics  · Reviewed and/or ordered Clinical lab tests  · Reviewed and/or ordered Radiology tests  · Reviewed and/or ordered of Medicine tests  · Independently visualized radiologic Images  · Reviewed the patients ECG / Telemetry  · Reviewed and/or adjusted Ventilator / NiPPV settings  ·  discussed my assessment/management with : Consultants, Nursing, Respiratory Therapy, Family for coordination of care           Thank you for allowing us to participate in the care of this patient. We will follow along with you until they no longer require CCU services.     Nora Davies MD

## 2019-03-19 NOTE — PROGRESS NOTES
.Reason for Admission:   Adominal pain/decreased PO/UTI RRAT Score:          24 
        
Plan for utilizing home health:     Family met with Palliative Care; Family provides total care in the home x 12 years. Likelihood of Readmission:  Moderate Transition of Care Plan:   Family hopes for improvement and return to home, however they understand her medical condition. Extubation and comfort measures were discussed with Palliative and plan of care and DNR updated. Care Management Interventions PCP Verified by CM: Yes 
Palliative Care Criteria Met (RRAT>21 & CHF Dx)?: Yes 
Palliative Consult Recommended?: Yes Mode of Transport at Discharge: (To be determined) Transition of Care Consult (CM Consult): Discharge Planning(Met with Palliative Team to discuss transition of care) Current Support Network: Relative's Home(Lives in home with son and D-I-L) Confirm Follow Up Transport: (To be determined-likely family) Plan discussed with Pt/Family/Caregiver: Yes Discharge Location Discharge Placement: Assisted Living(Expect D/C to home with family support.) CM will assist with transition of care plans as needed. Allyson Camacho RN, CHPN, [facility]

## 2019-03-19 NOTE — PROGRESS NOTES
1930 bedside shift report report received from [place]. Family at bedside updated. Patient restless. Attempt to repositioned and made comfortable. 2100 restarted sedation for biting tubes. RR up 26. BP up to 170. Prn hydralazine given. 12 am RASS -2.  2 am BS 78. 12.5 gm of D50 given. With repeat 203  4 am  Am bath rendered. tolerated. 6 am diprivan decreased rate. Open eyes. 630 am patient on CPAP. Tolerating mode.   7 am bedside shift report given to [employer]

## 2019-03-19 NOTE — PROGRESS NOTES
Hospitalist Progress Note NAME: Radha Morgan :  3/7/1930 MRN:  [id number] Assessment / Plan: 
 
Code Blue on 3/16/2019 Vtach arrest 
Acute hypoxemic resp failure, s/p code on the vent Sepsis, POA 2nd to R asp PNA 
UTI, POA klebsiella Hx pafib CM, EF 35% 
-has been on cefepime and flagyl- now stopped per palliative care 
-passed breathing trails, extubate this in am 
-on NC now, maintaining airway, DNR/I 
-long term prognosis poor, transitioned to comfort measures per palliative care today 
-hospice consult-p 
-appreciate cards/PCC following 
-transfer to floor Hypokalemia, POA.    
  
Fecal stasis in rectum, POA.  Having diarrhea around fecal stasis. 
  
Dementia, advanced baseline 
 - palliative care folowing 
  
DM 
  
Decubitus buttock ulcer, POA and Sacral redness, POA per ER nurse 
-wound care  
  
 
less than 18.5 Underweight / Body mass index is 16.14 kg/m². - Suspect mod to severe malnutrition, alb 1.6  
  
Code status: Full, changed to partial today Prophylaxis: lovenox Recommended Disposition: now on comfort measures, hospice eval-p Subjective: Chief Complaint / Reason for Physician Visit Fever, vomiting 3/18 Patient on the ventilator past her breathing trials this morning. Family met with palliative care and opted for partial CODE STATUS do not time to reinflate the patient does not do well. Plans are to try to extubate in the morning with family present. 3/19 pt extubated this am. Palliative care meeting and transition to comfort measures and DNR/I now. Hospice consulted. Maintain air way for now on 6L NC. Patient was evaluated at 3:15pm 
 
Discussed with RN events overnight. Review of Systems: 
Symptom Y/N Comments  Symptom Y/N Comments Fever/Chills    Chest Pain Poor Appetite    Edema Cough    Abdominal Pain Sputum    Joint Pain SOB/COLEMAN    Pruritis/Rash Nausea/vomit    Tolerating PT/OT Diarrhea    Tolerating Diet Constipation    Other Could NOT obtain due to: Nonverbal  
 
Objective: VITALS:  
Last 24hrs VS reviewed since prior progress note. Most recent are: 
Patient Vitals for the past 24 hrs: 
 Temp Pulse Resp BP SpO2  
03/19/19 1500  91 26 155/51 95 % 03/19/19 1422  67 14 146/56 100 % 03/19/19 1300  100 20 131/51 98 % 03/19/19 1200 98.7 °F (37.1 °C) 97 29 163/63 91 % 03/19/19 1100  90 23 159/74 97 % 03/19/19 1000  81 24 155/51 98 % 03/19/19 0945     96 % 03/19/19 0900  63 18 108/45 96 % 03/19/19 0800 98.9 °F (37.2 °C) 73 19 155/59 95 % 03/19/19 0725  76 19  96 % 03/19/19 0700  87 19 154/58 96 % 03/19/19 0600  84 22 (!) 154/106 100 % 03/19/19 0531  71  (!) 165/111   
03/19/19 0500 98.1 °F (36.7 °C) 83 25 (!) 165/111 97 % 03/19/19 0400  78 24 168/64 99 % 03/19/19 0326  76 18  98 % 03/19/19 0300  63 17 114/56 99 % 03/19/19 0200  66 16 100/49 98 % 03/19/19 0100  85 22 143/70 98 % 03/19/19 0000 99.6 °F (37.6 °C) 82 20 142/67 100 % 03/18/19 2321  76 18  99 % 03/18/19 2300  83 21 149/62 100 % 03/18/19 2200  67 29 104/45 97 % 03/18/19 2123  73  171/58   
03/18/19 2100  87 23 (!) 178/94 100 % 03/18/19 2000 99 °F (37.2 °C) 73 24 161/84 100 % 03/18/19 1911  77 21  100 % 03/18/19 1900  77 21 167/55 100 % 03/18/19 1800  (!) 58 16 126/54 99 % 03/18/19 1737  (!) 57 16  98 % 03/18/19 1700  86 19 166/70 98 % 03/18/19 1600 98.3 °F (36.8 °C) 83 24 162/62 99 % Intake/Output Summary (Last 24 hours) at 3/19/2019 1557 Last data filed at 3/19/2019 0700 Gross per 24 hour Intake 1959.14 ml Output 931 ml Net 1028.14 ml PHYSICAL EXAM: 
Patient  opens her eyes to voice but does not follow commands does seem to squeeze my hands slightly B but not on command.  On NC, but trying to exhale thru closed mouth, puffing out cheeks  Conjunctiva pink mucous membranes appear tacky cardiovascular regular rate and obvious murmurs rubs or gallops lungs are coarse bilaterally but no wheezes or crackles abdomen bowel sounds are present soft nontender extremities no clubbing or edema Reviewed most current lab test results and cultures  YES Reviewed most current radiology test results   YES Review and summation of old records today    NO Reviewed patient's current orders and MAR    YES 
PMH/SH reviewed - no change compared to H&P 
________________________________________________________________________ Care Plan discussed with: 
  Comments Patient y Family RN y   
Care Manager Consultant  y palliative care Multidiciplinary team rounds were held today with , nursing, pharmacist and clinical coordinator. Patient's plan of care was discussed; medications were reviewed and discharge planning was addressed. ________________________________________________________________________ Total NON critical care TIME:     Minutes Total CRITICAL CARE TIME Spent:   Minutes non procedure based Comments >50% of visit spent in counseling and coordination of care    
________________________________________________________________________ Alla Davis MD  
 
Procedures: see electronic medical records for all procedures/Xrays and details which were not copied into this note but were reviewed prior to creation of Plan. LABS: 
I reviewed today's most current labs and imaging studies. Pertinent labs include: 
Recent Labs  
  03/19/19 0421 03/18/19 0421 03/17/19 
3250 WBC 7.7 7.2 7.9 HGB 9.2* 8.9* 8.9* HCT 29.0* 27.1* 26.8*  
 159 153 Recent Labs  
  03/19/19 0421 03/18/19 
0415 03/17/19 
5102 * 147* 143  
K 3.5 3.6 3.2*  
* 118* 114* CO2 19* 20* 20* GLU 97 82 82 BUN 6 9 10 CREA 0.37* 0.43* 0.30* CA 7.5* 7.5* 7.4* MG  --  1.8 2.0 PHOS  --  3.2 2.6 ALB 1.7* 1.6* 1.7* TBILI 1.0 1.2* 1.7* SGOT 18 20 29 ALT 13 15 19 Signed: Katia Griffiths MD

## 2019-03-19 NOTE — PROGRESS NOTES
CM Consult received for Hospice info session - pt could possibly qualify for GIP bed depending on how she does post -extubation. Ref sent to [facility] via [address] [place] [address]. RASHEED spoke w/ edwin Ayala who will contact son Allison Ludwig to arrange meeting for tomorrow AM. Mynor Duffy, MSW

## 2019-03-19 NOTE — PROGRESS NOTES
03/19/19 0609   ABCDEF Bundle   SBT Safety Screen Passed Yes   SBT Trial Passed Yes   Weaning Parameters   Spontaneous Breathing Trial Complete Yes   Resp Rate Observed 25   Ve 10.2      RSBI 68

## 2019-03-19 NOTE — PROGRESS NOTES
Pharmacy  Enoxaparin (Lovenox®) Monitoring Indication: AFib Current Dose: Enoxaparin 50 mg subcutaneously every 24 hours Creatinine Clearance (mL/min): Estimated Creatinine Clearance: 86.2 mL/min (A) (based on SCr of 0.37 mg/dL (L)). Estimated Creatinine Clearance (using IBW):96.5 mL/min Labs: 
Recent Labs  
  03/19/19 
0421 03/18/19 
0421 03/18/19 
0215 03/17/19 
5404 CREA 0.37*  --  0.43* 0.30* HGB 9.2* 8.9*  --  8.9*  
 159  --  153 Wt Readings from Last 1 Encounters:  
03/19/19 53 kg (116 lb 13.5 oz) Ht Readings from Last 1 Encounters:  
03/16/19 167.6 cm (66\") Impression/Plan: · Adjusted to Enoxaparin 50 mg SQ q12h · CBC/BMP already ordered for 3/20 Thank you, 
Lige Boas, [facility]

## 2019-03-19 NOTE — PROGRESS NOTES
Palliative Medicine Consult Wilfrid: [id number] ([id number]) Patient Name: Juliet Pollard YOB: 1930 Date of Initial Consult: 3/18/19 Reason for Consult: care decisions Requesting Provider: Dominic Escalante MD 
Primary Care Physician: Erwin White MD 
 
 SUMMARY:  
Juliet Pollard is a 80 y.o. with a past history of dementia, TIA, and borderline diabetes, who was admitted on 3/14/2019 from home with a diagnosis of UTI. Current medical issues leading to Palliative Medicine involvement include: elderly, frail, advance dementia. Psychosocial: lives with son Tammi Griffith, who cares for pt. Pt is total care, stopped feeding self recently, past 2 weeks forgets food is in mouth, coughing while eating/drinking. PALLIATIVE DIAGNOSES:  
1. Abdominal pain 2. PNA right lung 3. Clinical sepsis 4. Ventral hernia 5. Vomiting 6. Diarrhea 7. UTI: klebsiella PNA 8. Sepsis 9. Fecal stasis with diarrhea around fecal stasis 10. Dementia 11. Oropharyngeal dysphagia 12. ACP PLAN:  
1. 0900am: Called by RN Dusty Belle that family is at bedside, waiting for extubation; they are under the impression that we were extubating pt today at 9:00am.  Discussed with Lauren and [address] [place] [address]; pt is stable for extubation, Dusty Belle expressed concerns about secretions. 1. Robinul 0.2mg IV q. 4 hours x 6 doses. 2. Scopolamine patch. 2. PAUL Griffith states she thought we had made plans for extubation today; we discussed that team feels she is ready, however, we need to discuss what we would do if pt tires out or does not do well. Family agrees no re-intubation. If pt declines, will put her on bipap and call family in to be with her when she passes. 3. I will change code sheet to DNR. 4. Palliative contact info provided to family.   
5. 10:30-11:00am: family meeting with dtr Lillie Ospina, danette Ram (phone), and extended family: discussed pt did well with extubation, now the problem we need to address is the dysphagia, this will be a problem for her the rest of her life, and at some point in time she will aspirate, develop PNA and die. Discussed risks vs benefits of tube feedings and that she would still be aspirating saliva. Discussed in detail 1) continuing aggressive medical care including tube feeding, IV abx, or, 2) comfort care; stopping abx, IVF. After much discussion, family has chosen CMO. I will ask Hospice to arrange a meeting with family for information session, then they will let me know if they can manage pt at home with Hospice support or if we will need to look at other options. In the meantime, if she declines and death is imminent, we will keep her here. 6. Orders adjusted to reflect CMO. 7. Initial consult note routed to primary continuity provider and/or primary health care team members 8. Communicated plan of care with: Palliative IDT, [facility] Team, Discussed POC with Ratna Elena, Cecilia Hung and  GOALS OF CARE / TREATMENT PREFERENCES:  
 
GOALS OF CARE: 
Patient/Health Care Proxy Stated Goals: Comfort TREATMENT PREFERENCES:  
Code Status: DNR Advance Care Planning: 
[x] The [facility] Interdisciplinary Team has updated the ACP Navigator with Dea and [facility] Capacity [facility] - [phone] Advance Care Planning 3/18/2019 Patient's Healthcare Decision Maker is: Legal Next of Kin Confirm Advance Directive None Patient Would Like to Complete Advance Directive Unable Medical Interventions: Comfort measures Other Instructions: 
Artificially Administered Nutrition: No feeding tube Other: As far as possible, the palliative care team has discussed with patient / health care proxy about goals of care / treatment preferences for patient. HISTORY:  
 
History obtained from: chart, family CHIEF COMPLAINT: abdominal pain, poor intake HPI/SUBJECTIVE: The patient is:  
[] Verbal and participatory [x] Non-participatory due to: dementia 3/14: BIBA with c/o decreased oral intake, vomiting, diarrhea with suspected abdominal pain for the past 2-3 days; pt has been bent over and holding her abdomen with suspected pain. Pt is nonverbal, incontinent and nonambulatory at baseline. Abnormal labs: wbc 15.0, UA consistent with UTI, UC sent, lipase 25, K 3.0, bili 2.2. ABD/PELV CT: fecal stasis in rectum, ventral wall hernia containing fat. CXR: diffuse airspace dz throughout the right lung consistent with PNA. Pt was admitted for routine progression of care. 3/15: RRT called for HR>120. Controlled with dilt gtt. Speech eval revealed mod to severe oral and mild to mod pharyngeal dysphagia, pocketing meds crushed in applesauce. 3/16: sustained v-tach, then pulseless, CPR and code blue called. Intubated, ROSC. 3/18: family in, opens eyes but does not follow commands (baseline). Pt did not open eyes for me this am. 
 
3/19: tolerated extubation. Awake, alert, but falls back to sleep quickly. Clinical Pain Assessment (nonverbal scale for severity on nonverbal patients):  
Clinical Pain Assessment Severity: 0 Activity (Movement): Lying quietly, normal position Duration: for how long has pt been experiencing pain (e.g., 2 days, 1 month, years) Frequency: how often pain is an issue (e.g., several times per day, once every few days, constant) FUNCTIONAL ASSESSMENT:  
 
Palliative Performance Scale (PPS): PPS: 20 
 
 
 PSYCHOSOCIAL/SPIRITUAL SCREENING:  
 
Palliative IDT has assessed this patient for cultural preferences / practices and a referral made as appropriate to needs (Cultural Services, Patient Advocacy, Ethics, etc.) Any spiritual / Jew concerns: 
[] Yes /  [x] No 
 
Caregiver Burnout: 
[] Yes /  [x] No /  [] No Caregiver Present Anticipatory grief assessment:  
[x] Normal  / [] Maladaptive ESAS Anxiety:   unable to assess due to pt factors ESAS Depression:   unable to assess due to pt factors REVIEW OF SYSTEMS:  
 
Positive and pertinent negative findings in ROS are noted above in HPI. The following systems were [x] reviewed / [] unable to be reviewed as noted in HPI Other findings are noted below. Systems: constitutional, ears/nose/mouth/throat, respiratory, gastrointestinal, genitourinary, musculoskeletal, integumentary, neurologic, psychiatric, endocrine. Positive findings noted below. Modified ESAS Completed by: provider Pain: 0 Dyspnea: 0 Stool Occurrence(s): 1 PHYSICAL EXAM:  
 
From RN flowsheet: 
Wt Readings from Last 3 Encounters:  
03/19/19 116 lb 13.5 oz (53 kg) 10/26/18 150 lb (68 kg) 03/14/18 150 lb (68 kg) Blood pressure 108/45, pulse 63, temperature 98.9 °F (37.2 °C), resp. rate 18, height 5' 6\" (1.676 m), weight 116 lb 13.5 oz (53 kg), SpO2 96 %. Pain Scale 1: Behavioral Pain Scale (BPS) Pain Intensity 1: 3 Last bowel movement, if known:  
 
Constitutional: elderly, frail, thin, NAD, awake, alert, unresponsive Eyes: pupils equal, anicteric ENMT: no nasal discharge, moist mucous membranes Cardiovascular: regular rhythm, distal pulses intact Respiratory: breathing not labored, symmetric Gastrointestinal: soft non-tender, +bowel sounds Musculoskeletal: no deformity, no tenderness to palpation Skin: warm, dry Neurologic: not following commands, moving all extremities Psychiatric:unable to assess due to pt factors HISTORY:  
 
Active Problems: 
  Dementia (8/16/2011) UTI (urinary tract infection) (3/12/2015) Pneumonia (3/14/2019) Sepsis ([facility] [address] (3/14/2019) Paroxysmal atrial fibrillation ([facility] [address] (3/15/2019) Cardiac arrest ([facility] [address] (3/16/2019) V tach ([facility] [address] (3/16/2019) Counseling regarding advanced care planning and goals of care (3/18/2019) Oropharyngeal dysphagia (3/18/2019) Past Medical History:  
Diagnosis Date  Alzheimer's dementia  Dementia  Diabetes ([facility] [address]  Hypertension  Stroke [facility]) 2011 TIA Past Surgical History:  
Procedure Laterality Date  HX GYN    
 hysterectomy History reviewed. No pertinent family history. History reviewed, no pertinent family history. Social History Tobacco Use  Smoking status: Never Smoker  Smokeless tobacco: Never Used Substance Use Topics  Alcohol use: No  
 
Allergies Allergen Reactions  Aspirin Other (comments) Daugher states no allergy  Pcn [Penicillins] Nausea and Vomiting Current Facility-Administered Medications Medication Dose Route Frequency  scopolamine (TRANSDERM-SCOP) 1 mg over 3 days 1 Patch  1 Patch TransDERmal Q72H  
 glycopyrrolate (ROBINUL) injection 0.2 mg  0.2 mg IntraVENous Q4H  
 heparin (porcine) pf 300 Units  300 Units InterCATHeter PRN  
 bacitracin 500 unit/gram packet 1 Packet  1 Packet Topical PRN  
 influenza vaccine 2018-19 (6 mos+)(PF) (FLUARIX QUAD/FLULAVAL QUAD) injection 0.5 mL  0.5 mL IntraMUSCular PRIOR TO DISCHARGE  sodium chloride (NS) flush 5-10 mL  5-10 mL IntraVENous PRN  
 levalbuterol (XOPENEX) nebulizer soln 0.63 mg/3 mL  0.63 mg Nebulization Q6H PRN  
 ipratropium (ATROVENT) 0.02 % nebulizer solution 0.5 mg  0.5 mg Nebulization Q6H PRN  
 
 
 
 LAB AND IMAGING FINDINGS:  
 
Lab Results Component Value Date/Time WBC 7.7 03/19/2019 04:21 AM  
 HGB 9.2 (L) 03/19/2019 04:21 AM  
 PLATELET 473 83/31/0344 04:21 AM  
 
Lab Results Component Value Date/Time  Sodium 146 (H) 03/19/2019 04:21 AM  
 Potassium 3.5 03/19/2019 04:21 AM  
 Chloride 116 (H) 03/19/2019 04:21 AM  
 CO2 19 (L) 03/19/2019 04:21 AM  
 BUN 6 03/19/2019 04:21 AM  
 Creatinine 0.37 (L) 03/19/2019 04:21 AM  
 Calcium 7.5 (L) 03/19/2019 04:21 AM  
 Magnesium 1.8 03/18/2019 04:15 AM  
 Phosphorus 3.2 03/18/2019 04:15 AM  
 Lab Results Component Value Date/Time AST (SGOT) 18 03/19/2019 04:21 AM  
 Alk. phosphatase 42 (L) 03/19/2019 04:21 AM  
 Protein, total 5.4 (L) 03/19/2019 04:21 AM  
 Albumin 1.7 (L) 03/19/2019 04:21 AM  
 Globulin 3.7 03/19/2019 04:21 AM  
 
Lab Results Component Value Date/Time INR 1.0 10/26/2018 04:21 PM  
 Prothrombin time 10.9 10/26/2018 04:21 PM  
 aPTT 29.4 08/16/2011 08:30 AM  
  
No results found for: IRON, FE, TIBC, IBCT, PSAT, FERR No results found for: PH, PCO2, PO2 No components found for: [place] Lab Results Component Value Date/Time CK 95 03/16/2019 08:17 AM  
 CK - MB 3.1 03/16/2019 08:17 AM  
  
 
 
   
 
Total time: 75 
Counseling / coordination time, spent as noted above: 65 
> 50% counseling / coordination?: y 
 
Prolonged service was provided for  []30 min   []75 min in face to face time in the presence of the patient, spent as noted above. Time Start: 0900am 
Time End:  1130am 
Note: this can only be billed with 28409 (initial) or 21 [phone] (follow up). If multiple start / stop times, list each separately.

## 2019-03-19 NOTE — PROGRESS NOTES
[address] [facility]  [phone] Cardiology Progress Note 3/19/2019 1030 AM 
 
Admit Date: 3/14/2019 Admit Diagnosis:  
Sepsis ([facility] [address] [A41.9] UTI (urinary tract infection) [N39.0] Pneumonia [J18.9] Subjective:  
 
Tamiko Grande is a 80 y.o. female with PMH HTN, DM, TIA, dementia who was admitted for Sepsis ([facility] [address] [A41.9] UTI (urinary tract infection) [N39.0] Pneumonia [J18.9].   
 
Overnight events: 
-s/p arrest over weekend on 3/16 
-hypertensive early this morning 
-Na 146; alb 1.7 
-weight up 6#;  I/O + 1.6L 
-Ms. Byron Monreal is extubated. Awake, but not interactive. Multiple visitors at bedside. Visit Vitals /45 Pulse 63 Temp 98.9 °F (37.2 °C) Resp 18 Ht 5' 6\" (1.676 m) Wt 53 kg (116 lb 13.5 oz) SpO2 96% BMI 18.86 kg/m² Current Facility-Administered Medications Medication Dose Route Frequency  glycopyrrolate (ROBINUL) injection 0.2 mg  0.2 mg IntraVENous Q4H PRN  
 scopolamine (TRANSDERM-SCOP) 1 mg over 3 days 1 Patch  1 Patch TransDERmal Q72H  enoxaparin (LOVENOX) injection 50 mg  +++ partial syringe  1 mg/kg SubCUTAneous Q12H  cefepime (MAXIPIME) 1 g in 0.9% sodium chloride (MBP/ADV) 50 mL  1 g IntraVENous Q12H  
 dextrose (D50W) injection syrg 25 g  25 g IntraVENous PRN  
 metroNIDAZOLE (FLAGYL) IVPB premix 500 mg  500 mg IntraVENous Q12H  
 heparin (porcine) pf 300 Units  300 Units InterCATHeter PRN  
 bacitracin 500 unit/gram packet 1 Packet  1 Packet Topical PRN  chlorhexidine (PERIDEX) 0.12 % mouthwash 15 mL  15 mL Oral Q12H  mupirocin (BACTROBAN) 2 % ointment   Both Nostrils Q12H  
 influenza vaccine 2018-19 (6 mos+)(PF) (FLUARIX QUAD/FLULAVAL QUAD) injection 0.5 mL  0.5 mL IntraMUSCular PRIOR TO DISCHARGE  
 0.9% sodium chloride with KCl 20 mEq/L infusion   IntraVENous CONTINUOUS  
 dilTIAZem (CARDIZEM) IR tablet 30 mg  30 mg Oral AC&HS  
  metoprolol tartrate (LOPRESSOR) tablet 12.5 mg  12.5 mg Oral BID  hydrALAZINE (APRESOLINE) 20 mg/mL injection 10 mg  10 mg IntraVENous Q6H PRN  
 acetaminophen (TYLENOL) tablet 650 mg  650 mg Oral Q6H PRN  
 sodium chloride (NS) flush 5-10 mL  5-10 mL IntraVENous PRN  
 levalbuterol (XOPENEX) nebulizer soln 0.63 mg/3 mL  0.63 mg Nebulization Q6H PRN  
 ipratropium (ATROVENT) 0.02 % nebulizer solution 0.5 mg  0.5 mg Nebulization Q6H PRN Objective:  
  
Physical Exam: 
General:  elderly AAF resting in bed in NAD Heart: RRR, no murmur Lungs: clear, dim bases. Abdomen: Soft, +BS, NTND Extremities: LE viji +DP/PT, 1+ edema BUE; trace LLE Neurologic: baseline nonverbal.  Not following commands. Skin:  Warm and dry.  
 
Data Review:  
Recent Labs  
  03/19/19 
0421 03/18/19 
0421 03/17/19 
4372 WBC 7.7 7.2 7.9 HGB 9.2* 8.9* 8.9* HCT 29.0* 27.1* 26.8*  
 159 153 Recent Labs  
  03/19/19 
0421 03/18/19 
0415 03/17/19 
3113 * 147* 143  
K 3.5 3.6 3.2*  
* 118* 114* CO2 19* 20* 20* GLU 97 82 82 BUN 6 9 10 CREA 0.37* 0.43* 0.30* CA 7.5* 7.5* 7.4* MG  --  1.8 2.0 PHOS  --  3.2 2.6 ALB 1.7* 1.6* 1.7* TBILI 1.0 1.2* 1.7* SGOT 18 20 29 ALT 13 15 19 No results for input(s): TROIQ, CPK, CKMB in the last 72 hours. Intake/Output Summary (Last 24 hours) at 3/19/2019 1026 Last data filed at 3/19/2019 0700 Gross per 24 hour Intake 2794.44 ml Output 1182 ml Net 1612.44 ml Telemetry: SR  with PVCs. Possible short burst of PAF 
ECG: a fib Read as 2:1 flutter Echocardiogram: EF 36-40%; global hypokinesis; mild AR; mild MR; mild TR; mild PHTN;  Trivial pericardial effusion CXRAY: \"Diffuse airspace disease throughout the right lung consistent with pneumonia. Follow-up to resolution is suggested. \" 
  
 
 
 
Assessment:  
 
Active Problems: 
  Dementia (8/16/2011) UTI (urinary tract infection) (3/12/2015) Pneumonia (3/14/2019) Sepsis ([facility] [address] (3/14/2019) Paroxysmal atrial fibrillation ([facility] [address] (3/15/2019) Cardiac arrest ([facility] [address] (3/16/2019) V tach ([facility] [address] (3/16/2019) Counseling regarding advanced care planning and goals of care (3/18/2019) Oropharyngeal dysphagia (3/18/2019) Plan:  
 
PAF:  Remains in SR with PVCs · Rate controlled on cardizem and metop. Was not given her PO meds through the OG prior to extubation this morning. May need to switch to IV medications if patient not cleared to take PO pills soon after extubation. · Had been on eliquis prior to code. Unless he has changed his mind, patient's son wanted AC. Sub-q heparin not effective as AC for a fib. Will order lovenox for now. VT arrest:  Morning of 3/17. Extubated 3/19. · Continue to monitor and replete electrolytes · EF 36-40% with global hypokinesis after arrest - if EF low 2/2 arrest and/or sepsis may see recovery · Patient now a DNR after palliative meeting with patient. Son not at bedside this morning to specify thoughts on cardiac eval, but palliative note indicates they hope for limited medical interventions. End-stage dementia with poor PO intake is not generally appropriate for invasive ischemic eval.    
 
 
Sepsis/PNA/UTI: 
· Per hospitalist and pulmonary Karoline Juarez NP 
DNP, RN, [facility] Patient seen and examined by me with nurse practitioner. Karena Menjivar personally performed all components of the history, physical, and medical decision making and agree with the assessment and plan with minor modifications as noted. Recommend comfort measures.   
 
Sri Patterson MD

## 2019-03-20 NOTE — PROGRESS NOTES
Oncology End of Shift Note Bedside shift change report given to Troy Mary RN (incoming nurse) by Carlene Caal (outgoing nurse) on [place] [address] Courts. Report included the following information SBAR, Kardex, MAR and Recent Results. Shift Summary: Patient rested quietly, did not sleep, no s/s of distress, no visual s/s of pain; patient repositioned, oral suction completed Issues for Physician to Address:   
 
Patient on Cardiac Monitoring? [] Yes 
[x] No 
 
Rhythm:   
 
 
 
Shift Events Carlene Caal

## 2019-03-20 NOTE — PROGRESS NOTES
Palliative Medicine Consult Wilfrid: [phone] ([phone]) Patient Name: Alonzo Tan YOB: 1930 Date of Initial Consult: 3/18/19 Reason for Consult: care decisions Requesting Provider: Uriel Belle MD 
Primary Care Physician: Malia Melendez MD 
 
 SUMMARY:  
Alonzo Tan is a 80 y.o. with a past history of dementia, TIA, and borderline diabetes, who was admitted on 3/14/2019 from home with a diagnosis of UTI. Current medical issues leading to Palliative Medicine involvement include: elderly, frail, advance dementia. Psychosocial: lives with son and KELSEA Griffith, who cares for pt. Pt is total care, stopped feeding self recently, past 2 weeks forgets food is in mouth, coughing while eating/drinking. PALLIATIVE DIAGNOSES:  
1. Abdominal pain 2. PNA right lung 3. Clinical sepsis 4. Ventral hernia 5. Vomiting 6. Diarrhea 7. UTI: klebsiella PNA 8. Sepsis 9. Fecal stasis with diarrhea around fecal stasis 10. Dementia 11. Oropharyngeal dysphagia 12. ACP PLAN:  
1. Checked in with pt and daughter Rosemary Tellez: provided support to Rosemary Tellez, who is still struggling with her mom's admission to Hospice and end of life care. Also discussed management of secretions, that frequent suctioning will only increase secretions, noises bother us not pt. 2. Plan at this time is discharge to [facility] with Hospice support; waiting for [facility] to authorize transfer. 3. Initial consult note routed to primary continuity provider and/or primary health care team members 4. Communicated plan of care with: Palliative IDT, [facility] Team, Yani Chaidez GOALS OF CARE / TREATMENT PREFERENCES:  
 
GOALS OF CARE: 
Patient/Health Care Proxy Stated Goals: Comfort TREATMENT PREFERENCES:  
Code Status: DNR Advance Care Planning: 
[x] The [facility] Interdisciplinary Team has updated the ACP Navigator with Davy 8 and Patient Capacity Primary Decision MakerSevert Fox - [phone] Advance Care Planning 3/18/2019 Patient's Healthcare Decision Maker is: Legal Next of Kin Confirm Advance Directive None Patient Would Like to Complete Advance Directive Unable Medical Interventions: Comfort measures Other Instructions: 
Artificially Administered Nutrition: No feeding tube Other: As far as possible, the palliative care team has discussed with patient / health care proxy about goals of care / treatment preferences for patient. HISTORY:  
 
History obtained from: chart, family CHIEF COMPLAINT: abdominal pain, poor intake HPI/SUBJECTIVE: The patient is:  
[] Verbal and participatory [x] Non-participatory due to: dementia 3/14: BIBA with c/o decreased oral intake, vomiting, diarrhea with suspected abdominal pain for the past 2-3 days; pt has been bent over and holding her abdomen with suspected pain. Pt is nonverbal, incontinent and nonambulatory at baseline. Abnormal labs: wbc 15.0, UA consistent with UTI, UC sent, lipase 25, K 3.0, bili 2.2. ABD/PELV CT: fecal stasis in rectum, ventral wall hernia containing fat. CXR: diffuse airspace dz throughout the right lung consistent with PNA. Pt was admitted for routine progression of care. 3/15: RRT called for HR>120. Controlled with dilt gtt. Speech eval revealed mod to severe oral and mild to mod pharyngeal dysphagia, pocketing meds crushed in applesauce. 3/16: sustained v-tach, then pulseless, CPR and code blue called. Intubated, ROSC. 3/18: family in, opens eyes but does not follow commands (baseline). Pt did not open eyes for me this am. 
 
3/19: tolerated extubation. Awake, alert, but falls back to sleep quickly. 3/20: awake, alert, non-interactive. Clinical Pain Assessment (nonverbal scale for severity on nonverbal patients):  
Clinical Pain Assessment Severity: 0 Activity (Movement): Lying quietly, normal position Duration: for how long has pt been experiencing pain (e.g., 2 days, 1 month, years) Frequency: how often pain is an issue (e.g., several times per day, once every few days, constant) FUNCTIONAL ASSESSMENT:  
 
Palliative Performance Scale (PPS): PPS: 20 
 
 
 PSYCHOSOCIAL/SPIRITUAL SCREENING:  
 
Palliative IDT has assessed this patient for cultural preferences / practices and a referral made as appropriate to needs (Cultural Services, Patient Advocacy, Ethics, etc.) Any spiritual / Jehovah's witness concerns: 
[] Yes /  [x] No 
 
Caregiver Burnout: 
[] Yes /  [x] No /  [] No Caregiver Present Anticipatory grief assessment:  
[x] Normal  / [] Maladaptive ESAS Anxiety:   unable to assess due to pt factors ESAS Depression:   unable to assess due to pt factors REVIEW OF SYSTEMS:  
 
Positive and pertinent negative findings in ROS are noted above in HPI. The following systems were [x] reviewed / [] unable to be reviewed as noted in HPI Other findings are noted below. Systems: constitutional, ears/nose/mouth/throat, respiratory, gastrointestinal, genitourinary, musculoskeletal, integumentary, neurologic, psychiatric, endocrine. Positive findings noted below. Modified ESAS Completed by: provider Pain: 0 Dyspnea: 0 Stool Occurrence(s): 1 PHYSICAL EXAM:  
 
From RN flowsheet: 
Wt Readings from Last 3 Encounters:  
03/19/19 116 lb 13.5 oz (53 kg) 10/26/18 150 lb (68 kg) 03/14/18 150 lb (68 kg) Blood pressure 166/57, pulse 77, temperature 98.7 °F (37.1 °C), resp. rate 22, height 5' 6\" (1.676 m), weight 116 lb 13.5 oz (53 kg), SpO2 100 %. Pain Scale 1: Behavioral Pain Scale (BPS) Pain Intensity 1: 5 Last bowel movement, if known:  
 
Constitutional: elderly, frail, thin, NAD, awake, alert, unresponsive Eyes: pupils equal, anicteric ENMT: no nasal discharge, moist mucous membranes Cardiovascular: regular rhythm, distal pulses intact Respiratory: breathing not labored, symmetric Gastrointestinal: soft non-tender, +bowel sounds Musculoskeletal: no deformity, no tenderness to palpation Skin: warm, dry Neurologic: not following commands, moving all extremities Psychiatric:unable to assess due to pt factors HISTORY:  
 
Active Problems: 
  Dementia (8/16/2011) UTI (urinary tract infection) (3/12/2015) Pneumonia (3/14/2019) Sepsis ([address] (3/14/2019) Paroxysmal atrial fibrillation ([address] (3/15/2019) Cardiac arrest ([address] (3/16/2019) V tach ([facility] (3/16/2019) Counseling regarding advanced care planning and goals of care (3/18/2019) Oropharyngeal dysphagia (3/18/2019) Past Medical History:  
Diagnosis Date  Alzheimer's dementia  Dementia  Diabetes ([facility]  Hypertension  Stroke [facility]) 2011 TIA Past Surgical History:  
Procedure Laterality Date  HX GYN    
 hysterectomy History reviewed. No pertinent family history. History reviewed, no pertinent family history. Social History Tobacco Use  Smoking status: Never Smoker  Smokeless tobacco: Never Used Substance Use Topics  Alcohol use: No  
 
Allergies Allergen Reactions  Aspirin Other (comments) Daugher states no allergy  Pcn [Penicillins] Nausea and Vomiting Current Facility-Administered Medications Medication Dose Route Frequency  scopolamine (TRANSDERM-SCOP) 1 mg over 3 days 1 Patch  1 Patch TransDERmal Q72H  
 glycopyrrolate (ROBINUL) injection 0.2 mg  0.2 mg IntraVENous Q4H  
 heparin (porcine) pf 300 Units  300 Units InterCATHeter PRN  
 bacitracin 500 unit/gram packet 1 Packet  1 Packet Topical PRN  
 influenza vaccine 2018-19 (6 mos+)(PF) (FLUARIX QUAD/FLULAVAL QUAD) injection 0.5 mL  0.5 mL IntraMUSCular PRIOR TO DISCHARGE  sodium chloride (NS) flush 5-10 mL  5-10 mL IntraVENous PRN  
 levalbuterol (XOPENEX) nebulizer soln 0.63 mg/3 mL  0.63 mg Nebulization Q6H PRN  
  ipratropium (ATROVENT) 0.02 % nebulizer solution 0.5 mg  0.5 mg Nebulization Q6H PRN  
 
 
 
 LAB AND IMAGING FINDINGS:  
 
Lab Results Component Value Date/Time WBC 7.7 03/19/2019 04:21 AM  
 HGB 9.2 (L) 03/19/2019 04:21 AM  
 PLATELET 816 57/07/6426 04:21 AM  
 
Lab Results Component Value Date/Time Sodium 146 (H) 03/19/2019 04:21 AM  
 Potassium 3.5 03/19/2019 04:21 AM  
 Chloride 116 (H) 03/19/2019 04:21 AM  
 CO2 19 (L) 03/19/2019 04:21 AM  
 BUN 6 03/19/2019 04:21 AM  
 Creatinine 0.37 (L) 03/19/2019 04:21 AM  
 Calcium 7.5 (L) 03/19/2019 04:21 AM  
 Magnesium 1.8 03/18/2019 04:15 AM  
 Phosphorus 3.2 03/18/2019 04:15 AM  
  
Lab Results Component Value Date/Time AST (SGOT) 18 03/19/2019 04:21 AM  
 Alk. phosphatase 42 (L) 03/19/2019 04:21 AM  
 Protein, total 5.4 (L) 03/19/2019 04:21 AM  
 Albumin 1.7 (L) 03/19/2019 04:21 AM  
 Globulin 3.7 03/19/2019 04:21 AM  
 
Lab Results Component Value Date/Time INR 1.0 10/26/2018 04:21 PM  
 Prothrombin time 10.9 10/26/2018 04:21 PM  
 aPTT 29.4 08/16/2011 08:30 AM  
  
No results found for: IRON, FE, TIBC, IBCT, PSAT, FERR No results found for: PH, PCO2, PO2 No components found for: [place] Lab Results Component Value Date/Time CK 95 03/16/2019 08:17 AM  
 CK - MB 3.1 03/16/2019 08:17 AM  
  
 
 
   
 
Total time: 25 
Counseling / coordination time, spent as noted above: 20 
> 50% counseling / coordination?: y 
 
Prolonged service was provided for  []30 min   []75 min in face to face time in the presence of the patient, spent as noted above. Time Start: 0900am 
Time End:  1130am 
Note: this can only be billed with 89347 (initial) or 21 [phone] (follow up). If multiple start / stop times, list each separately.

## 2019-03-20 NOTE — PROGRESS NOTES
Problem: Airway Clearance - Ineffective Goal: *Patent airway Outcome: Progressing Towards Goal 
  
Problem: Airway Clearance - Ineffective Goal: *Absence of airway secretions Description Monitor breathing pat per unit norms, suction as needed for built up secretions, give medications that assist with secretions, keep HOB elevated, continue on turn team to facilitated chest drainage. Outcome: Progressing Towards Goal 
  
Problem: Airway Clearance - Ineffective Goal: *Able to cough effectively Outcome: Progressing Towards Goal 
  
Problem: Airway Clearance - Ineffective Goal: *PALLIATIVE CARE:  Alleviation of secretions, cough and/or nasal congestion Outcome: Progressing Towards Goal

## 2019-03-20 NOTE — PROGRESS NOTES
16:09 pm, Outbound call to patient's son Mayra Aranda). POA informed writer he'll be at the hospital tomorrow morning to sign forms, and to choose another hospice agency. Writer acknowledged understanding. JAMIN Weller 
 
 
15:58 pm,  Outbound call to [employer]. Identified self, role, and nature of the call. Emily Encarnacion acknowledged understanding. Mary Rout of the call re: [place] of Choice for Andrew. Emily Encarnacion voiced he had spoken to Hospice RN Alexandre Ngo) & Ade Enciso (RN/RASHEED). Angel informed writer \"I'm not the POA, my brother Travis Ang is the POA. I have the living will. Talk to Angel\". Writer acknowledged understanding. JAMIN Weller Met w/patient's daughter Gene Clarke. Naeem Sindy voiced she's not the POA one of her brothers Samantha Timothy or Ramyarobina Ruiz). Naeem Callahan provided writer w/the phone numbers to both brothers.    
 
JAMIN Weller

## 2019-03-20 NOTE — PROGRESS NOTES
Patient offered [facility] for hospice and for SNF. Patient and family have met with [employer] and patient not currently appropriate for inpatient hospice.  Hospice report they are currently capped for services in [phone] area and checking to see if they can follow patient at SNF or if referral needs to be sent to another hospice agency. Referral sent via CC to [facility] and waiting approval for admission. Patient currently has Medicare and [facility]. CM will complete [employer]. Kodak Parikh RN, BSN, [facility]   [phone] 
 
3/20/2019 
3:59pm 
 
[employer] unable to service patient discharge area. Sons were offered additional freedom of choice for hospice in the community - still waiting to see if patient accepted to [facility]. Referral sent via [employer] to [facility] and new [facility] obtained. Kodak Parikh RN, BSN, [facility]   [phone] 
 
3/20/2019 4:35pm 
 
[facility] has accepted patient - will come to hospital to talk with family and assess patient - will follow at SNF once acceptance confirmed. Kodak Parikh RN, BSN, [facility]   [phone]

## 2019-03-20 NOTE — PROGRESS NOTES
Oncology End of Shift Note Bedside shift change report given to Jeb Carbone (incoming nurse) by Refugio Johansen RN (outgoing nurse) on [facility]. Report included the following information SBAR. Shift Summary:  
Family had meeting with hospice and chose to go with this option. Spoke with family several times to help set them at ease. Family left early evening and pt remains about the same. Issues for Physician to Address:   
 
Patient on Cardiac Monitoring? [] Yes 
[x] No 
 
Rhythm:   
 
 
 
Shift Events Refugio Johansen RN

## 2019-03-20 NOTE — HOSPICE
[employer] Good Help to Those in Need 
[phone] Patient Name: Leslie Paredes YOB: 1930 Age: 80 y.o. [employer] RN Note:  Hospice consult noted, Chart reviewed, Plan of care reviewed with patients nurse & Care manager. Met With Melissa Stallings and Leroy Carroll ([facility]). Hospice information provided. Patient discharging to area that [employer] has determined full at this time. CM updated with above information and will arrange other hospice referrals at this time and arrange transportation when D/C is determined. Thank you for the opportunity to be of service to this patient.

## 2019-03-20 NOTE — PROGRESS NOTES
Hospitalist Progress Note NAME: Joycelyn Guerra :  3/7/1930 MRN:  [id number] Assessment / Plan: 
 
Comfort measures s/p extubation 
-await hospice eval, home vs inpt 
-looks comfortable, but no po See below for details of hospitalization: 
 
Code Blue on 3/16/2019 Vtach arrest 
Acute hypoxemic resp failure, s/p code on the vent Sepsis, POA 2nd to R asp PNA 
UTI, POA klebsiella Hx pafib CM, EF 35% 
-has been on cefepime and flagyl- now stopped per palliative care 
-passed breathing trails, extubate this in am 
-on NC now, maintaining airway, DNR/I 
-long term prognosis poor, transitioned to comfort measures per palliative care today 
-hospice consult-p 
-appreciate cards/PCC following 
-transfer to floor Hypokalemia, POA.    
  
Fecal stasis in rectum, POA.  Having diarrhea around fecal stasis. 
  
Dementia, advanced baseline 
 - palliative care folowing 
  
DM 
  
Decubitus buttock ulcer, POA and Sacral redness, POA per ER nurse 
-wound care  
  
 
less than 18.5 Underweight / Body mass index is 16.14 kg/m². - Suspect mod to severe malnutrition, alb 1.6  
  
Code status: DNR Prophylaxis: lovenox, stopped with comfort measures Recommended Disposition: now on comfort measures, hospice eval-p Subjective: Chief Complaint / Reason for Physician Visit Fever, vomiting 3/18 Patient on the ventilator past her breathing trials this morning. Family met with palliative care and opted for partial CODE STATUS do not time to reinflate the patient does not do well. Plans are to try to extubate in the morning with family present. 3/19 pt extubated this am. Palliative care meeting and transition to comfort measures and DNR/I now. Hospice consulted. Maintain air way for now on 6L NC. 
 
3/20 awake on nc, non verbal, looks comfortable Patient was evaluated at 8:15am 
 
Discussed with RN events overnight. Review of Systems: 
Symptom Y/N Comments  Symptom Y/N Comments Fever/Chills    Chest Pain Poor Appetite    Edema Cough    Abdominal Pain Sputum    Joint Pain SOB/COLEMAN    Pruritis/Rash Nausea/vomit    Tolerating PT/OT Diarrhea    Tolerating Diet Constipation    Other Could NOT obtain due to: Nonverbal  
 
Objective: VITALS:  
Last 24hrs VS reviewed since prior progress note. Most recent are: 
Patient Vitals for the past 24 hrs: 
 Temp Pulse Resp BP SpO2  
03/20/19 0757 98.5 °F (36.9 °C) 85 24 161/68 100 % 03/19/19 2026 98.3 °F (36.8 °C) 76 22 137/50 94 % 03/19/19 1800  76 24 174/62 100 % 03/19/19 1700  85 20 173/79 99 % 03/19/19 1600 98.4 °F (36.9 °C) 66 18 162/52 99 % 03/19/19 1500  91 26 155/51 95 % 03/19/19 1422  67 14 146/56 100 % 03/19/19 1300  100 20 131/51 98 % 03/19/19 1200 98.7 °F (37.1 °C) 97 29 163/63 91 % 03/19/19 1100  90 23 159/74 97 % 03/19/19 1000  81 24 155/51 98 % 03/19/19 0945     96 % Intake/Output Summary (Last 24 hours) at 3/20/2019 7958 Last data filed at 3/20/2019 9855 Gross per 24 hour Intake 376.25 ml Output 1075 ml Net -698.75 ml PHYSICAL EXAM: 
Patient awake and alert does not follow commands  On NC, but trying to exhale thru closed mouth, puffing out cheeks  Conjunctiva pink mucous membranes appear tacky cardiovascular regular rate and obvious murmurs rubs or gallops lungs  bilaterally rhonchi, R>Lm but no wheezes or crackles abdomen bowel sounds are present soft nontender extremities no clubbing or edema Reviewed most current lab test results and cultures  YES Reviewed most current radiology test results   YES Review and summation of old records today    NO Reviewed patient's current orders and MAR    YES 
PMH/SH reviewed - no change compared to H&P 
________________________________________________________________________ Care Plan discussed with: 
  Comments Patient y Family RN y   
Care Manager Consultant Multidiciplinary team rounds were held today with , nursing, pharmacist and clinical coordinator. Patient's plan of care was discussed; medications were reviewed and discharge planning was addressed. ________________________________________________________________________ Total NON critical care TIME:     Minutes Total CRITICAL CARE TIME Spent:   Minutes non procedure based Comments >50% of visit spent in counseling and coordination of care    
________________________________________________________________________ Quentin Velasco MD  
 
Procedures: see electronic medical records for all procedures/Xrays and details which were not copied into this note but were reviewed prior to creation of Plan. LABS: 
I reviewed today's most current labs and imaging studies. Pertinent labs include: 
Recent Labs  
  03/19/19 
0421 03/18/19 
0421 WBC 7.7 7.2 HGB 9.2* 8.9* HCT 29.0* 27.1*  
 159 Recent Labs  
  03/19/19 
0421 03/18/19 
0415 * 147* K 3.5 3.6 * 118* CO2 19* 20* GLU 97 82 BUN 6 9 CREA 0.37* 0.43* CA 7.5* 7.5* MG  --  1.8 PHOS  --  3.2 ALB 1.7* 1.6* TBILI 1.0 1.2* SGOT 18 20 ALT 13 15 Signed: Quentin Velasco MD

## 2019-03-21 NOTE — PROGRESS NOTES
Problem: Airway Clearance - Ineffective Goal: *Patent airway Outcome: Progressing Towards Goal 
 Oral suction at bedside and performed as needed. Checking for clear airway during hourly rounds.

## 2019-03-21 NOTE — PROGRESS NOTES
Referral sent via CC to [id number] to see if they can accept patient with [facility] per family choice. Johanna Bustos, RN, BSN, ACM [id number] [facility]   [phone]

## 2019-03-21 NOTE — PROGRESS NOTES
Received call from RN that patient is experiencing some discomfort and agitation Reviewed chart, and patient is on comfort care, waiting for placement with hospice Will order Roxanol and Lorazepam sublingual to help with symptoms Will follow up tomorrow Catrina Hamilton NP

## 2019-03-21 NOTE — PROGRESS NOTES
Oncology End of Shift Note Bedside shift change report given to Christofer Zavala RN (incoming nurse) by Gabby Nicole RN (outgoing nurse) on Tamra Carvajal. Report included the following information SBAR. Shift Summary:  
Pt had increased agitation today, called palliative for better med coverage. Issues for Physician to Address:   
 
Patient on Cardiac Monitoring? [] Yes 
[x] No 
 
Rhythm:   
 
 
 
Shift Events Gabby Nicole RN

## 2019-03-21 NOTE — PROGRESS NOTES
Oncology End of Shift Note Bedside shift change report given to Walter Bishop RN (incoming nurse) by Shruti Davis (outgoing nurse) on [facility]. Report included the following information SBAR, Kardex, MAR and Recent Results. Shift Summary: No acute changes Issues for Physician to Address:   
 
Patient on Cardiac Monitoring? [] Yes 
[] No 
 
Rhythm:   
 
 
 
Shift Events Shruti Davis

## 2019-03-21 NOTE — PROGRESS NOTES
Problem: Pressure Injury - Risk of 
Goal: *Prevention of pressure injury Description Document Keegan Scale and appropriate interventions in the flowsheet. Outcome: Progressing Towards Goal 
Note:  
Pressure Injury Interventions: 
Sensory Interventions: Assess changes in LOC, Check visual cues for pain, Float heels, Minimize linen layers Moisture Interventions: Absorbent underpads, Internal/External urinary devices, Internal/External fecal devices, Minimize layers Activity Interventions: Pressure redistribution bed/mattress(bed type) Mobility Interventions: Pressure redistribution bed/mattress (bed type), HOB 30 degrees or less Nutrition Interventions: Document food/fluid/supplement intake Friction and Shear Interventions: Minimize layers, Lift sheet

## 2019-03-21 NOTE — PROGRESS NOTES
Hospitalist Progress Note NAME: Michi Menchaca :  3/7/1930 MRN:  [id number] Assessment / Plan: 
 
Comfort measures s/p extubation 
-resp more labored today 
-plans for transfer to SNF for hospice today, but not accepted 
-await eval by hospice to determine if now meets criteria for inpt. See below for details of hospitalization: 
 
Code Blue on 3/16/2019 Vtach arrest 
Acute hypoxemic resp failure, s/p code on the vent Sepsis, POA 2nd to R asp PNA 
UTI, POA klebsiella Hx pafib CM, EF 35% 
-has been on cefepime and flagyl- now stopped per palliative care 
-passed breathing trails, extubate this in am 
-on NC now, maintaining airway, DNR/I 
-long term prognosis poor, transitioned to comfort measures per palliative care today 
-hospice consult-p 
-appreciate cards/PCC following 
-transfer to floor Hypokalemia, POA.    
  
Fecal stasis in rectum, POA.  Having diarrhea around fecal stasis. 
  
Dementia, advanced baseline 
 - palliative care folowing 
  
DM 
  
Decubitus buttock ulcer, POA and Sacral redness, POA per ER nurse 
-wound care  
  
 
less than 18.5 Underweight / Body mass index is 16.14 kg/m². - Suspect mod to severe malnutrition, alb 1.6  
  
Code status: DNR Prophylaxis: lovenox, stopped with comfort measures Recommended Disposition: now on comfort measures, hospice eval-p Subjective: Chief Complaint / Reason for Physician Visit Fever, vomiting 3/18 Patient on the ventilator past her breathing trials this morning. Family met with palliative care and opted for partial CODE STATUS do not time to reinflate the patient does not do well. Plans are to try to extubate in the morning with family present. 3/19 pt extubated this am. Palliative care meeting and transition to comfort measures and DNR/I now. Hospice consulted. Maintain air way for now on 6L NC. 
 
3/20 awake on nc, non verbal, looks comfortable 3/21 pt awake on nc, nonverbal.  Seems more labored today. Awaiting hospice eval. 
 
Patient was evaluated at 7:40am 
 
Discussed with RN events overnight. Review of Systems: 
Symptom Y/N Comments  Symptom Y/N Comments Fever/Chills    Chest Pain Poor Appetite    Edema Cough    Abdominal Pain Sputum    Joint Pain SOB/COLEMAN    Pruritis/Rash Nausea/vomit    Tolerating PT/OT Diarrhea    Tolerating Diet Constipation    Other Could NOT obtain due to: Nonverbal  
 
Objective: VITALS:  
Last 24hrs VS reviewed since prior progress note. Most recent are: 
Patient Vitals for the past 24 hrs: 
 Temp Pulse Resp BP SpO2  
03/21/19 1617 98 °F (36.7 °C) (!) 132  145/87 100 % 03/21/19 0801 99 °F (37.2 °C) (!) 128 24 142/82 100 % Intake/Output Summary (Last 24 hours) at 3/21/2019 9191 Last data filed at 3/21/2019 1506 Gross per 24 hour Intake  Output 250 ml Net -250 ml PHYSICAL EXAM: 
Patient awake and alert does not follow commands  On NC, breathing more labored today. Conjunctiva pink mucous membranes appear dry, some secretions,  cardiovascular regular rate and obvious murmurs rubs or gallops lungs  bilaterally rhonchi, R>Lm but no wheezes or crackles abdomen bowel sounds are present soft nontender extremities no clubbing or edema Reviewed most current lab test results and cultures  YES Reviewed most current radiology test results   YES Review and summation of old records today    NO Reviewed patient's current orders and MAR    YES 
PMH/ reviewed - no change compared to H&P 
________________________________________________________________________ Care Plan discussed with: 
  Comments Patient y Family RN y   
Care Manager Consultant Multidiciplinary team rounds were held today with , nursing, pharmacist and clinical coordinator.   Patient's plan of care was discussed; medications were reviewed and discharge planning was addressed. ________________________________________________________________________ Total NON critical care TIME:     Minutes Total CRITICAL CARE TIME Spent:   Minutes non procedure based Comments >50% of visit spent in counseling and coordination of care    
________________________________________________________________________ Dayana Rodriguez MD  
 
Procedures: see electronic medical records for all procedures/Xrays and details which were not copied into this note but were reviewed prior to creation of Plan. LABS: 
I reviewed today's most current labs and imaging studies. Pertinent labs include: 
Recent Labs  
  03/19/19 0421 WBC 7.7 HGB 9.2* HCT 29.0*  
 Recent Labs  
  03/19/19 0421 *  
K 3.5 * CO2 19* GLU 97 BUN 6  
CREA 0.37* CA 7.5* ALB 1.7* TBILI 1.0  
SGOT 18 ALT 13 Signed: Dayana Rodriguez MD

## 2019-03-21 NOTE — PROGRESS NOTES
Case being reviewed by [facility]. They have not accepted patient at present time, will review in AM for possible placement with hospice care. This information has been escalated to hospitalist and CM Manager. Ashvin Bright, RN, BSN, ACM [phone] [facility]   [phone]

## 2019-03-22 PROBLEM — J96.90 RESPIRATORY FAILURE (HCC): Status: ACTIVE | Noted: 2019-01-01

## 2019-03-22 NOTE — PROGRESS NOTES
Oncology End of Shift Note Bedside shift change report given to Vera Salas RN (incoming nurse) by Clary Rubalcava RN (outgoing nurse) on [facility]. Report included the following information SBAR. Shift Summary: hospice care, pain control Issues for Physician to Address:   
 
Patient on Cardiac Monitoring?    
[] Yes 
[x] No 
 
Rhythm:   
 
 
 
Danay Winter RN

## 2019-03-22 NOTE — ROUTINE PROCESS
Oncology End of Shift Note Bedside shift change report given to Blanka Calix RN (incoming nurse) by Peyton Diaz (outgoing nurse) on [facility]. Report included the following information SBAR, Kardex, MAR and Recent Results. Shift Summary: No acute changes Issues for Physician to Address:   
 
Patient on Cardiac Monitoring? [] Yes 
[] No 
 
Rhythm:   
 
 
 
Shift Events Peyton Diaz

## 2019-03-22 NOTE — HOSPICE
[employer] [facility] 
[phone] Inpatient Nursing Admission Patient Name: Dorothy Penny YOB: 1930 Age: 80 y.o. Date of Hospice Admission: 3/22/2019 Hospice Attending Elected by Patient: Fletcher Dempsey MD 
Primary Care Physician: Romeo Yap MD 
Admitting RN: Jose Rafael Blackburn RN : Francisca Khan LCSW Level of Care (GIP/Routine/Respite): [facility] Facility of Care: [address] [place] [address] Patient Room: [address] [place] [address] HOSPICE SUMMARY  
ER Visits/ Hospitalizations in past year: 3/14/19 UTI Hospice Diagnosis: Respiratory failure ([facility] [J96.90] Onset Date of Hospice Diagnosis: 2019 Summary of Disease Progression Leading to Hospice Diagnosis: Dorothy Penny, 80 y.o. female with PMHx significant for dementia and borderline diabetes, presents by EMS to the ED on 3/14/19 with cc of approximately 2-3 days of decreasing p.o. intake, vomiting, and diarrhea, with suspected abdominal pain. Family notes patient has been bent over apparently holding her abdomen due to pain. She is nonverbal at baseline due to history of dementia. Family has had difficulty getting patient to take any p.o. intake. They are not aware of any fever at home. They report some coughing, dry. No recent sick contacts. Patient is incontinent at baseline. Co-Morbidities:  
Patient Active Problem List  
Diagnosis Code  TIA (transient ischemic attack) G45.9  Dementia F03.90  DM (diabetes mellitus) ([facility]) E11.9  
 HBP (high blood pressure) I10  
 ICH (intracerebral hemorrhage) ([facility]) I61.9  Hyperlipemia E78.5  Hypoglycemia associated with diabetes ([facility] E11.649  
 UTI (urinary tract infection) N39.0  Delirium R41.0  Pneumonia J18.9  Sepsis ([address] A41.9  Paroxysmal atrial fibrillation ([facility]) I48.0  Cardiac arrest ([address] I46.9  V tach ([facility] I47.2  Counseling regarding advanced care planning and goals of care Z71.89  
 Oropharyngeal dysphagia R13.12  
  Abdominal pain R10.9  Restlessness and agitation R45.1  Shortness of breath R06.02  
 Comfort measures only status Z51.5  Respiratory failure ([address] J96.90 Diagnoses RELATED to the terminal prognosis:  Pneumonia, UTI Other Diagnoses: See above Rationale for a prognosis of life expectancy of 6 months or less if the disease follows its normal course (Disease Specific History):  
 
Jasen Olson is a 80 y.o. who was admitted to [facility]. The patient's principle diagnosis of Respiratory Failure with Aspiration Pneumonia has resulted in [facility] hospice admission. Functionally, the patient's Palliative Performance Scale has declined over a period of weeks and is estimated at 10. Objective information that support this patients limited prognosis includes: 
 
Portable AP semiupright view of the chest is obtained. 
  
 The left lung remains clear. There is volume loss and diffuse areas of airspace 
process throughout the right lung. Skin folds overlie the right hemithorax. There is a PICC line on the right now unchanged, tip at the superior vena 
cava-right atrial junction. No pneumothorax. ET tube is in satisfactory 
position. . NG tube is noted. Heart and mediastinal shadows are stable. .  
  
  
  
IMPRESSION IMPRESSION: 
  
No right-sided pneumothorax. Persistent consolidation throughout the right lung with volume loss.  
  
Results for Deann Diver (MRN [id number]) as of 3/22/2019 20:19 Ref. Range 3/19/2019 04:21 Sodium Latest Ref Range: 136 - 145 mmol/L 146 (H) Potassium Latest Ref Range: 3.5 - 5.1 mmol/L 3.5 Chloride Latest Ref Range: 97 - 108 mmol/L 116 (H) CO2 Latest Ref Range: 21 - 32 mmol/L 19 (L) Anion gap Latest Ref Range: 5 - 15 mmol/L 11 Glucose Latest Ref Range: 65 - 100 mg/dL 97 BUN Latest Ref Range: 6 - 20 MG/DL 6 Creatinine Latest Ref Range: 0.55 - 1.02 MG/DL 0.37 (L) BUN/Creatinine ratio Latest Ref Range: 12 - 20   16  
 Calcium Latest Ref Range: 8.5 - 10.1 MG/DL 7.5 (L)  
GFR est non-AA Latest Ref Range: >60 ml/min/1.73m2 >60  
GFR est AA Latest Ref Range: >60 ml/min/1.73m2 >60 Bilirubin, total Latest Ref Range: 0.2 - 1.0 MG/DL 1.0 Protein, total Latest Ref Range: 6.4 - 8.2 g/dL 5.4 (L) Albumin Latest Ref Range: 3.5 - 5.0 g/dL 1.7 (L) Globulin Latest Ref Range: 2.0 - 4.0 g/dL 3.7 A-G Ratio Latest Ref Range: 1.1 - 2.2   0.5 (L) ALT (SGPT) Latest Ref Range: 12 - 78 U/L 13 AST Latest Ref Range: 15 - 37 U/L 18 Alk. phosphatase Latest Ref Range: 45 - 117 U/L 42 (L) The patient/family chose comfort measures with the support of Hospice. Patient meets for GIP LOC as evidenced by Agitation, non-verbal pain, dyspnea, excessive secretions. Prognosis estimated based on 03/22/19 clinical assessment is:  
[] Few to Many Hours [x] Hours to Days  
[] Few to Many Days  
[] Days to Weeks  
[] Few to Many Weeks  
[] Weeks to Months  
[] Few to Many Months ASSESSMENT Patient self-reports:  []  Yes    [x] No 
 
SYMPTOMS: Agitation, non-verbal pain, dyspnea, excessive secretions. SIGNS/PHYSICAL FINDINGS: Patient observed with increasing agitation, observed with furrowed brows and grimacing, dyspnea at rest, R=24, coarse lung sounds, increasing congestion. KARNOFSKY: 10 
 
FAST for all dementia:   
 
Learning Assessment: 
Patient Is patient willing/able to learn? No 
What is the highest level of education completed? Learning preference (written material, demonstration, visual)? Learning barriers (ESOL, Koi, poor vision)? Caregiver Is caregiver willing to learn care for patient? What is the highest level of education completed? Learning preference (written material, demonstration, visual)? Learning barriers (ESOL, Koi, poor vision)? CLINICAL INFORMATION Wt Readings from Last 3 Encounters:  
03/19/19 53 kg (116 lb 13.5 oz) 10/26/18 68 kg (150 lb)  
03/14/18 68 kg (150 lb) Ht Readings from Last 3 Encounters:  
03/16/19 5' 6\" (1.676 m)  
10/26/18 5' 3\" (1.6 m)  
08/16/11 5' 4\" (1.626 m) There is no height or weight on file to calculate BMI. There were no vitals taken for this visit. LAB VALUES No results found for this visit on 03/22/19 (from the past 12 hour(s)). No results found for this visit on 03/22/19 (from the past 6 hour(s)). Lab Results Component Value Date/Time Protein, total 5.4 (L) 03/19/2019 04:21 AM  
 Albumin 1.7 (L) 03/19/2019 04:21 AM  
 
 
Currently this patient has: 
[] Supplemental O2 [] Peripheral IV  [x] PICC    [] PORT [x] Green Catheter [] NG Tube   [] PEG Tube [x] Fecal Management System   
[] AICD: Has ICD been deactivated? [] Yes [] No:______ PLAN 1. Admit GIP level of care for agitation, non-verbal pain, dyspnea, excessive secretions, high risk for rapid decline. 2. Morphine 2mg IV every 4 hrs and 15 mins PRN for pain and dyspnea. 3. Lorazepam 1mg every 4 hrs and 15 mins PRN for agitation/restlessness, anxiety. 4. Scopolamine Patch and Robinul every 4 hrs for excessive secretions. 5. Acetaminophen Supp PRN for fever 6.  and SW visits for Emotional and spiritual support. Hospice Team Frequency Orders:Skilled Nurse - Daily x 7 days with 5 PRN visits for symptom control. MSW  1 visit for initial assessment/evaluation for family support and need for volunteer services. Shakeel Bhatt  1 visit for initial assessment/evaluation for spiritual support. ADVANCE CARE PLANNING (Complete in ACP Flow Sheet) Code Status: DNR Durable DNR: []  Yes  [x]  No 
Code Status Discussed/Confirmed:  Yes Preference for Other Life Sustaining Treatment Discussed/Confirmed:  Yes Hospitalization Preference:  [facility] Advance Care Planning 3/18/2019 Patient's Healthcare Decision Maker is: Legal Next of Kin Confirm Advance Directive None Patient Would Like to Complete Advance Directive Unable  Service: [] Yes  []  No      [x] Unknown Appropriate for Pinning Ceremony:  [] Yes     [] No 
Latter-day: Buddhism  Home: TBD 
 
DISCHARGE PLANNING 1. Discharge Plan: Review disposition if pt is stable for transport to facility with hospice. 2. Patient/Family teaching: EOL, symptom management, long term care 3. Response to patient/family teaching: family verbalized understanding SOCIAL/EMOTIONAL/SPIRITUAL NEEDS Spiritual Issues Identified: None at this time,  to visit. Psych/ Social/ Emotional Issues Identified: None at this time, LCSW assessed. Caregiver Support: 
[x] Provided information on End of Life Care  
[] Material Provided: Gone From My Sight or Journey's End  
 
CARE COORDINATION Dr. Cesia Bragg contacted, discharge to hospice order received Dr. Samaria Ocampo contacted, agrees to serve as attending provider for hospice and provided verbal certification of terminal illness with life expectancy of 6 months or less. Orders for hospice admission, medications and plan of treatment received. Medication reconciliation completed. MEDS: See medication list below DME: Per hospital 
Supplies: Per hospital 
IDT communication to include MD, SN, SW, CH and support team 
 
ALLERGIES AND MEDICATIONS Allergies: Allergies Allergen Reactions  Aspirin Other (comments) Daugher states no allergy  Pcn [Penicillins] Nausea and Vomiting Current Facility-Administered Medications Medication Dose Route Frequency  sodium chloride (NS) flush 5 mL  5 mL IntraVENous PRN  
 LORazepam (ATIVAN) injection 1 mg  1 mg IntraVENous Q15MIN PRN  
 LORazepam (ATIVAN) injection 1 mg  1 mg IntraVENous Q4H  
 acetaminophen (TYLENOL) suppository 650 mg  650 mg Rectal Q4H PRN  
 glycopyrrolate (ROBINUL) injection 0.2 mg  0.2 mg IntraVENous Q4H  
 scopolamine (TRANSDERM-SCOP) 1 mg over 3 days 1 Patch  1 Patch TransDERmal Q72H  bisacodyl (DULCOLAX) suppository 10 mg  10 mg Rectal DAILY PRN  
  morphine injection 2 mg  2 mg IntraVENous Q4H  
 morphine injection 2 mg  2 mg IntraVENous Q15MIN PRN

## 2019-03-22 NOTE — PROGRESS NOTES
Palliative Medicine Consult [place]: [phone] [phone]) Patient Name: Joycelyn Guerra YOB: 1930 Date of Initial Consult: 3/18/19 Reason for Consult: care decisions Requesting Provider: Andrade Larsen MD 
Primary Care Physician: Brice Ramírez MD 
 
 SUMMARY:  
Joycelyn Guerra is a 80 y.o. with a past history of dementia, TIA, and borderline diabetes, who was admitted on 3/14/2019 from home with a diagnosis of UTI. Current medical issues leading to Palliative Medicine involvement include: elderly, frail, advance dementia. Psychosocial: lives with son and KELSEA Griffith, who cares for pt. Pt is total care, stopped feeding self recently, past 2 weeks forgets food is in mouth, coughing while eating/drinking. PALLIATIVE DIAGNOSES:  
1. Abdominal pain 2. Restlessness/Agitation 3. Shortness of breath 4. Comfort measures 5. PNA right lung 6. Clinical sepsis 7. Ventral hernia 8. Vomiting 9. Diarrhea 10. UTI: klebsiella PNA 11. Sepsis 12. Fecal stasis with diarrhea around fecal stasis 13. Dementia 14. Oropharyngeal dysphagia 15. ACP PLAN:  
1. Received call from RN yesterday afternoon that patient was starting to appear uncomfortable and agitated. Added PRN lorazepam and roxinol at that point 2. This morning she is sleeping, appears comfortable, did not wake up for me with gentle tactile stimulation 3. Will follow for symptom management until hospice accepts patient 4. Initial consult note routed to primary continuity provider and/or primary health care team members 5. Communicated plan of care with: Palliative [facility] Team, GOALS OF CARE / TREATMENT PREFERENCES:  
 
GOALS OF CARE: 
Patient/Health Care Proxy Stated Goals: Comfort TREATMENT PREFERENCES:  
Code Status: DNR Advance Care Planning: 
[x] [facility] Interdisciplinary Team has updated the ACP Navigator with [place] and Patient Capacity Primary Decision MakeSophia Fox - [phone] Advance Care Planning 3/18/2019 Patient's Healthcare Decision Maker is: Legal Next of Kin Confirm Advance Directive None Patient Would Like to Complete Advance Directive Unable Medical Interventions: Comfort measures Other Instructions: 
Artificially Administered Nutrition: No feeding tube Other: As far as possible, the palliative care team has discussed with patient / health care proxy about goals of care / treatment preferences for patient. HISTORY:  
 
History obtained from: chart, family CHIEF COMPLAINT: abdominal pain, poor intake HPI/SUBJECTIVE: The patient is:  
[] Verbal and participatory [x] Non-participatory due to: dementia 3/14: BIBA with c/o decreased oral intake, vomiting, diarrhea with suspected abdominal pain for the past 2-3 days; pt has been bent over and holding her abdomen with suspected pain. Pt is nonverbal, incontinent and nonambulatory at baseline. Abnormal labs: wbc 15.0, UA consistent with UTI, UC sent, lipase 25, K 3.0, bili 2.2. ABD/PELV CT: fecal stasis in rectum, ventral wall hernia containing fat. CXR: diffuse airspace dz throughout the right lung consistent with PNA. Pt was admitted for routine progression of care. 3/15: RRT called for HR>120. Controlled with dilt gtt. Speech eval revealed mod to severe oral and mild to mod pharyngeal dysphagia, pocketing meds crushed in applesauce. 3/16: sustained v-tach, then pulseless, CPR and code blue called. Intubated, ROSC. 3/18: family in, opens eyes but does not follow commands (baseline). Pt did not open eyes for me this am. 
 
3/19: tolerated extubation. Awake, alert, but falls back to sleep quickly. 3/20: awake, alert, non-interactive.  
 
3/22; sleeping, comfortable Clinical Pain Assessment (nonverbal scale for severity on nonverbal patients):  
Clinical Pain Assessment Severity: 0 Activity (Movement): Lying quietly, normal position Duration: for how long has pt been experiencing pain (e.g., 2 days, 1 month, years) Frequency: how often pain is an issue (e.g., several times per day, once every few days, constant) FUNCTIONAL ASSESSMENT:  
 
Palliative Performance Scale (PPS): PPS: 30 
 
 
 PSYCHOSOCIAL/SPIRITUAL SCREENING:  
 
Palliative IDT has assessed this patient for cultural preferences / practices and a referral made as appropriate to needs (Cultural Services, Patient Advocacy, Ethics, etc.) Any spiritual / Synagogue concerns: 
[] Yes /  [x] No 
 
Caregiver Burnout: 
[] Yes /  [x] No /  [] No Caregiver Present Anticipatory grief assessment:  
[x] Normal  / [] Maladaptive ESAS Anxiety:   unable to assess due to pt factors ESAS Depression:   unable to assess due to pt factors REVIEW OF SYSTEMS:  
 
Positive and pertinent negative findings in ROS are noted above in HPI. The following systems were [] reviewed / [x] unable to be reviewed as noted in HPI Other findings are noted below. Systems: constitutional, ears/nose/mouth/throat, respiratory, gastrointestinal, genitourinary, musculoskeletal, integumentary, neurologic, psychiatric, endocrine. Positive findings noted below. Modified ESAS Completed by: provider Fatigue: 10 Pain: 0 Dyspnea: 0 Stool Occurrence(s): 1 PHYSICAL EXAM:  
 
From RN flowsheet: 
Wt Readings from Last 3 Encounters:  
03/19/19 116 lb 13.5 oz (53 kg) 10/26/18 150 lb (68 kg) 03/14/18 150 lb (68 kg) Blood pressure (!) 156/98, pulse (!) 114, temperature 99 °F (37.2 °C), resp. rate 24, height 5' 6\" (1.676 m), weight 116 lb 13.5 oz (53 kg), SpO2 100 %. Pain Scale 1: Adult Nonverbal Pain Scale Pain Intensity 1: 0 Last bowel movement, if known:  
 
Constitutional: elderly, frail, thin, NAD, unresponsive ENMT: no nasal discharge, moist mucous membranes Respiratory: breathing not labored, symmetric Musculoskeletal: no deformity, no tenderness to palpation Skin: warm, dry Psychiatric:unable to assess due to pt factors HISTORY:  
 
Active Problems: 
  Dementia (8/16/2011) UTI (urinary tract infection) (3/12/2015) Pneumonia (3/14/2019) Sepsis ([facility] [address] (3/14/2019) Paroxysmal atrial fibrillation ([address] (3/15/2019) Cardiac arrest ([address] (3/16/2019) V tach ([address] (3/16/2019) Counseling regarding advanced care planning and goals of care (3/18/2019) Oropharyngeal dysphagia (3/18/2019) Past Medical History:  
Diagnosis Date  Alzheimer's dementia  Dementia  Diabetes ([facility] [address]  Hypertension  Stroke [facility]) 2011 TIA Past Surgical History:  
Procedure Laterality Date  HX GYN    
 hysterectomy History reviewed. No pertinent family history. History reviewed, no pertinent family history. Social History Tobacco Use  Smoking status: Never Smoker  Smokeless tobacco: Never Used Substance Use Topics  Alcohol use: No  
 
Allergies Allergen Reactions  Aspirin Other (comments) Daugher states no allergy  Pcn [Penicillins] Nausea and Vomiting Current Facility-Administered Medications Medication Dose Route Frequency  morphine (ROXANOL) concentrated oral syringe 5 mg  5 mg SubLINGual Q2H PRN  
 LORazepam (INTENSOL) 2 mg/mL oral concentrate 1 mg  1 mg SubLINGual Q4H PRN  
 scopolamine (TRANSDERM-SCOP) 1 mg over 3 days 1 Patch  1 Patch TransDERmal Q72H  
 glycopyrrolate (ROBINUL) injection 0.2 mg  0.2 mg IntraVENous Q4H  
 heparin (porcine) pf 300 Units  300 Units InterCATHeter PRN  
 bacitracin 500 unit/gram packet 1 Packet  1 Packet Topical PRN  
 influenza vaccine 2018-19 (6 mos+)(PF) (FLUARIX QUAD/FLULAVAL QUAD) injection 0.5 mL  0.5 mL IntraMUSCular PRIOR TO DISCHARGE  sodium chloride (NS) flush 5-10 mL  5-10 mL IntraVENous PRN  
  levalbuterol (XOPENEX) nebulizer soln 0.63 mg/3 mL  0.63 mg Nebulization Q6H PRN  
 ipratropium (ATROVENT) 0.02 % nebulizer solution 0.5 mg  0.5 mg Nebulization Q6H PRN  
 
 
 
 LAB AND IMAGING FINDINGS:  
 
Lab Results Component Value Date/Time WBC 7.7 03/19/2019 04:21 AM  
 HGB 9.2 (L) 03/19/2019 04:21 AM  
 PLATELET 581 71/59/2260 04:21 AM  
 
Lab Results Component Value Date/Time Sodium 146 (H) 03/19/2019 04:21 AM  
 Potassium 3.5 03/19/2019 04:21 AM  
 Chloride 116 (H) 03/19/2019 04:21 AM  
 CO2 19 (L) 03/19/2019 04:21 AM  
 BUN 6 03/19/2019 04:21 AM  
 Creatinine 0.37 (L) 03/19/2019 04:21 AM  
 Calcium 7.5 (L) 03/19/2019 04:21 AM  
 Magnesium 1.8 03/18/2019 04:15 AM  
 Phosphorus 3.2 03/18/2019 04:15 AM  
  
Lab Results Component Value Date/Time AST (SGOT) 18 03/19/2019 04:21 AM  
 Alk. phosphatase 42 (L) 03/19/2019 04:21 AM  
 Protein, total 5.4 (L) 03/19/2019 04:21 AM  
 Albumin 1.7 (L) 03/19/2019 04:21 AM  
 Globulin 3.7 03/19/2019 04:21 AM  
 
Lab Results Component Value Date/Time INR 1.0 10/26/2018 04:21 PM  
 Prothrombin time 10.9 10/26/2018 04:21 PM  
 aPTT 29.4 08/16/2011 08:30 AM  
  
No results found for: IRON, FE, TIBC, IBCT, PSAT, FERR No results found for: PH, PCO2, PO2 No components found for: [place] Lab Results Component Value Date/Time CK 95 03/16/2019 08:17 AM  
 CK - MB 3.1 03/16/2019 08:17 AM  
  
 
 
   
 
Total time:  
Counseling / coordination time, spent as noted above:  
> 50% counseling / coordination?:  
 
Prolonged service was provided for  []30 min   []75 min in face to face time in the presence of the patient, spent as noted above. Time Start: 
Time End:   
Note: this can only be billed with 39656 (initial) or 53062 (follow up). If multiple start / stop times, list each separately.

## 2019-03-22 NOTE — H&P
[employer] [facility] to Those in Need 
[phone] Patient Name: Saud Tinoco YOB: 1930 Date of Provider Hospice Visit: 03/22/19 Level of Care:   [x] General Inpatient (GIP)    [] Routine   [] Respite Current Location of Care: 
[] [facility] [] [facility] [] [facility] [] [facility] [] Hospice [facility], patient referred from: 
[] [facility] [] [facility] [] [facility] [] [facility] [] Home [] Other:  
 
Date of Original Hospice Admission: 3-22-19 Hospice Medical Director at time of admission: Dr. Eliecer Beltrán Principle Hospice Diagnosis: Aspiration Pneumonia Diagnoses RELATED to the terminal prognosis:sepsis, respiratory failure, Klebsiella UTI Other Diagnoses: CHF ef 35%, dysphagia dementia Nadia Ma Do not cut and paste chart information other than imaging findings Saud Tinoco is a 80y.o. year old who was admitted to [facility]. Pt with hx  CHF ef 35%Dementia, hx TIA , aphasic was admitted from home with nausea/vomiting  with hypoxic respiratory failure due to aspiration pneumonia. On 3-16-19 had code blue arrest with V-tach and intubated and managed in ICU. Treated for severe sepsis d/t aspiration and klebsiella UTI. Pt developed multiorgan failure and and family has opted for comfort care. Pt was compassionately extubated . Pt has had excess secretions and labored breathing requiring symptom medications. Rectal tube was placed for ongoing diarrhea. Morphine x 3 doses , ativan x 1 in last 24 hsr. Admitted to hospice GIP today The patient's principle diagnosis has resulted in respiratory failure , dyspnea Refer to LCD Functionally, the patient's Karnofsky and/or Palliative Performance Scale has declined over a period of days and is estimated at 10. The patient is dependent on the following ADLs:all Objective information that support this patients limited prognosis includes: CXR  3-17-19 IMPRESSION IMPRESSION: 
 Persistent consolidation throughout the right lung with volume loss. .   
 
The patient/family chose comfort measures with the support of Hospice. HOSPICE DIAGNOSES Active Symptoms: 1. Dyspnea 2. Excess secretions PLAN 1. Admit to hospice GIP 
2. Iv morphine 2mg q4 and prn 3. Ativan 1 mg iv q4 and prn 4. Medication as ordered for secretions 5.  and SW to support family needs 6. Disposition: to home with hospice but appears imminently dying Prognosis estimated based on 03/22/19 clinical assessment is:  
[x] Hours to Days   
[] Days to Weeks   
[] Other: 
 
Communicated plan of care with: Hospice Case Manager; Hospice IDT; Care Team 
 
 GOALS OF CARE Patient/Medical POA stated Goal of Care: comfort 
 
[] I have reviewed and/or updated ACP information in the Advance Care Planning Navigator. This information is available in the [facility] [address] link in the patient's chart header. Primary Decision [facility] Agent):   Primary Decision StonerIkate Pyshantel - Child - [phone] Resuscitation Status: DNR If DNR is there a Durable DNR on file? : [] Yes [] No (If no, complete Durable DNR) HISTORY History obtained from: hospice RN 
 
CHIEF COMPLAINT:  
The patient is:  
[] Verbal 
[x] Nonverbal 
[x] Unresponsive HPI/SUBJECTIVE:   
 
nonresponsive but with labored breathing REVIEW OF SYSTEMS The following systems were: [] reviewed  [x] unable to be reviewed Positive ROS include: 
Constitutional: fatigue, weakness, in pain, short of breath Ears/nose/mouth/throat: increased airway secretions Respiratory:shortness of breath, wheezing Gastrointestinal:poor appetite, nausea, vomiting, abdominal pain, constipation, diarrhea Musculoskeletal:pain, deformities, swelling legs Neurologic:confusion, hallucinations, weakness Psychiatric:anxiety, feeling depressed, poor sleep Endocrine:  
 
Adult Non-Verbal Pain Assessment Score: Face [x] 0   No particular expression or smile 
[] 1   Occasional grimace, tearing, frowning, wrinkled forehead 
[] 2   Frequent grimace, tearing, frowning, wrinkled forehead Activity (movement) [x] 0   Lying quietly, normal position 
[] 1   Seeking attention through movement or slow, cautious movement 
[] 2   Restless, excessive activity and/or withdrawal reflexes Guarding 
[x] 0   Lying quietly, no positioning of hands over areas of body 
[] 1   Splinting areas of the body, tense 
[] 2   Rigid, stiff Physiology (vital signs) 
[] 0   Stable vital signs [] 1   Change in any of the following: SBP > 20mm Hg; HR > 20/minute 
[] 2   Change in any of the following: SBP > 30mm Hg; HR > 25/minute Respiratory 
[] 0   Baseline RR/SpO2, compliant with ventilator 
[x] 1   RR > 10 above baseline, or 5% drop SpO2, mild asynchrony with ventilator 
[] 2   RR > 20 above baseline, or 10% drop SpO2, asynchrony with ventilator FUNCTIONAL ASSESSMENT Palliative Performance Scale (PPS):10 
 
 
 PSYCHOSOCIAL/SPIRITUAL ASSESSMENT Active Problems: 
  Respiratory failure ([facility] [address] (3/22/2019) Past Medical History:  
Diagnosis Date  Alzheimer's dementia  Dementia  Diabetes ([facility] [address]  Hypertension  Stroke [facility]) 2011 TIA Past Surgical History:  
Procedure Laterality Date  HX GYN    
 hysterectomy Social History Tobacco Use  Smoking status: Never Smoker  Smokeless tobacco: Never Used Substance Use Topics  Alcohol use: No  
 
No family history on file. Allergies Allergen Reactions  Aspirin Other (comments) Daugher states no allergy  Pcn [Penicillins] Nausea and Vomiting Current Facility-Administered Medications Medication Dose Route Frequency  sodium chloride (NS) flush 5 mL  5 mL IntraVENous PRN  
 LORazepam (ATIVAN) injection 1 mg  1 mg IntraVENous Q15MIN PRN  
 LORazepam (ATIVAN) injection 1 mg  1 mg IntraVENous Q4H  
  acetaminophen (TYLENOL) suppository 650 mg  650 mg Rectal Q4H PRN  
 glycopyrrolate (ROBINUL) injection 0.2 mg  0.2 mg IntraVENous Q4H  
 scopolamine (TRANSDERM-SCOP) 1 mg over 3 days 1 Patch  1 Patch TransDERmal Q72H  bisacodyl (DULCOLAX) suppository 10 mg  10 mg Rectal DAILY PRN  
 morphine injection 2 mg  2 mg IntraVENous Q4H  
 morphine injection 2 mg  2 mg IntraVENous Q15MIN PRN  
 
 
 PHYSICAL EXAM  
 
Wt Readings from Last 3 Encounters:  
03/19/19 116 lb 13.5 oz (53 kg) 10/26/18 150 lb (68 kg) 03/14/18 150 lb (68 kg) There were no vitals taken for this visit. Supplemental O2  [x] Yes  [] NO Last bowel movement:  
 
Currently this patient has: 
[x] Peripheral IV [] PICC  [] PORT [] ICD [x] Green Catheter [] NG Tube   [] PEG Tube   
[x] Rectal Tube [] Drain 
[] Other:  
 
Constitutional: obtunded, Eyes: perrl ENMT: srinivasan-clear Cardiovascular: rrr Respiratory: decreased bs at bases, labored breathing Gastrointestinal: soft, rectal tube with loose dark stools Musculoskeletal:no edema Skin:intact Neurologic:ontunded Psychiatric:  
Other:  
 
 
Pertinent Lab and or Imaging Tests: 
Lab Results Component Value Date/Time Sodium 146 (H) 03/19/2019 04:21 AM  
 Potassium 3.5 03/19/2019 04:21 AM  
 Chloride 116 (H) 03/19/2019 04:21 AM  
 CO2 19 (L) 03/19/2019 04:21 AM  
 Anion gap 11 03/19/2019 04:21 AM  
 Glucose 97 03/19/2019 04:21 AM  
 BUN 6 03/19/2019 04:21 AM  
 Creatinine 0.37 (L) 03/19/2019 04:21 AM  
 BUN/Creatinine ratio 16 03/19/2019 04:21 AM  
 GFR est AA >60 03/19/2019 04:21 AM  
 GFR est non-AA >60 03/19/2019 04:21 AM  
 Calcium 7.5 (L) 03/19/2019 04:21 AM  
 
Lab Results Component Value Date/Time Protein, total 5.4 (L) 03/19/2019 04:21 AM  
 Albumin 1.7 (L) 03/19/2019 04:21 AM  
 
   
 
Total time:  50 minutes Counseling / coordination time: 13 min-d/w hospice RN. Family were not present at time of my visit.  
> 50% counseling / coordination?: no

## 2019-03-22 NOTE — HOSPICE
[facility] 
[phone] Nursing Note Patient Name: Rehana Sanderson YOB: 1930 Age: 80 y.o. [address] M[address] Garcia RN Note:  Hospice consult noted. Chart reviewed. Patient re-assessed today by this Josie Blum RN and Francisca KEITH. Patient observed with increasing agitation, observed with furrowed brows and grimacing, dyspnea at rest, R=24, coarse lung sounds, increasing congestion. Also, requiring PRN Morphine and Lorazepam for symptom management with minimal relief. Dr London Urbano consulted. Patient is appropriate for GIP admission at this time. Family is in agreement with hospice services and is in route to hospital to sign consents. Blanka IQBAL. Attending Dr Jayy Newton contacted to obtain discharge order. Thank you for the opportunity to care for this patient and family. Please contact [facility] at [phone] with any questions or concerns.

## 2019-03-22 NOTE — HOSPICE
[facility] Good Help to Those in Need 
[phone] Social Work Admission NotePatient Name: Avtar Lai YOB: 1930 Age: 80 y.o. Date of Visit: 03/22/19 Facility of Care: [facility] Patient Room: 1112/01 Hospice Attending: Jamey Garza MD 
Hospice Diagnosis: Respiratory failure [facility]) [J96.90] Level of Care:  
 [x]  GIP []  Respite 
 []  Routine NARRATIVE This LCSW met with pts 2 sons Janay Rowan and Svetlana Singh for GIP in pt hospice admission. Pt is an 79 y/o AAF with a hospice diagnosis respiratory failure, in the setting of PNA. Pt has comorbid AD dementia, DM, and HTN. Pt has hx of dementia, not engaging and non verbal. Pt was living with her son Janay Rowan and his wife Angie. Family reports pt was dx with dementia in 2005 after a stroke. Pt is , and has 3children; the 2 sons mentioned above and a daughter Timi Brannon. 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 The family describes her as a happy person, a woman of great nino who loved 49s and 61s RB. The family talked about the tender care and nurturing she gave her family. Family reports pt was raised in a very close knit community and she is 1 of 9 children, with 3 living siblings. Sons will contact their sister and other family members to inform of hospice admission. LCSW provided emotional support and supportive counseling to Janay Rowan in coping with feeling of loss in the context of losing a close family friend he has known for 42 years 2 weeks ago. Both Janay Rowan and Alexy report they are realistic and accepting. LCSW and family discussed hospice in pt care and what that would look like. Janay Rowan reports he can no longer care for pt in the home. If disposition is needed son reports they would seek NH placement with Medicaid for pt as son reported pt has LTC Medicaid.  LCSW and family discussed her large family and the many visitors they anticipate over the weekend. LCSW encouraged reminiscing. Pt is very strong in her [employer]. [facility] in [place], [place] to serve. ADVANCE CARE PLANNING Code Status: DNR Durable DNR: _ Yes  X_ No 
Advance Care Planning 3/18/2019 Patient's Healthcare Decision Maker is: Legal Next of Kin Confirm Advance Directive None Patient Would Like to Complete Advance Directive Unable Relationship Status: 
[]  Single    
[]       
[]     
[]  Domestic Partner    
[x]  / 
[]  Common Law 
[]   
[]  Unknown If in a relationship, name of partner/spouse: 
Duration of relationship: 
 
Druze: Yarsanism  Home: [place] [address], [address] Resources Provided:   
 
Social Work Initial Assessment Gender: 
female Race/Ethnicity: (rubina all that apply) []  American [place] [facility]) or [place] Native 
[]   
[x]  Black or [place] American 
[]   or  
[]   or [place] 
[]  Lissa Every 
[]  Unknown 
  
 Service:   
[]  Yes  
[x]  No      
[]  Unknown Appropriate for Pinning Ceremony:  
[]  Yes     
[]  No 
Is patient using VA benefits? []  Yes     
[]  No 
  
Primary Language: ENGLISH []   Needed 
[]   utilized during visit Ability to express thoughts/needs/feelings 
[]  Expressed thoughts/feelings/needs without difficulty 
[]  Requires extra time and cuing 
[]  Speech limited single words 
[]  Uses only gestures (eye, blinking eye or head movement/pointing) [x]  Unable to express thoughts/feelings/needs (speech unintelligible or inappropriate) []  Unresponsive Notes:  
  
Mental Status: 
[]  Alert-oriented to:   
 []  Person   
 []  Place   
 []  Time 
[]  Comatose-responds to:  
 []   Verbal stimuli  
 []  Tactile stimuli  
 []  Painful stimuli 
[]  Forgetful 
[x]  Disoriented/Confused 
[x]  Lethargic [x]  Agitated 
[x]  Other (specify):  DEMENTIA Notes:  
  
 Patients description of Illness/Current Health Status:   
[x]  Patient unable to discuss 
[]  Patient unwilling to discuss [x]  (Specify) DEMENTIA Knowledge/Understanding of Disease Process Patient:  
 []  Demonstrates knowledge/understanding of disease process 
 []  Demonstrates knowledge/understanding of treatment plan 
 []  Demonstrates knowledge/understanding of prognosis []  Demonstrates acceptance of prognosis []  Demonstrates knowledge/understanding of resuscitation status [x]  Other (specify) DEMENTIA Caregiver: 
 [x]  Demonstrates knowledge/understanding of disease process [x]  Demonstrates knowledge/understanding of treatment plan 
 [x]  Demonstrates knowledge/understanding of prognosis [x]  Demonstrates acceptance of prognosis [x]  Demonstrates knowledge/understanding of resuscitation status 
 []  Other (specify) Notes:  
  
Patients living arrangement/care setting: 
Use the PRIOR COLUMN when the PATIENTS current health status necessitated a change in his/her primary residence. Prior Current Response  
           []             []    Patients own home/residence [x]             []    Home of family member/friend []             []    Boarding home  
           []             []    Assisted living facility/FPC center [x]             []    Hospital/Acute care facility []             []    Skilled nursing facility []             []    Long term care facility/Nursing home  
           []             [x]    Hospice in Patient Primary Caregiver: 
[]  No Primary Caregiver Name of Primary Caregiver: Fazal Martinez Relationship or Primary Caregiver:  
 []  Spouse/Significant other     
 [x]  Natural Child      
 []  Step child     
 []  Sibling 
 []  Parent 
 []  Friend/Neighbor 
 []  Community/Zoroastrianism Volunteer 
 []  Paid help 
 []  Other (specify):___________ Notes:   
  
Family members/Significant others: Name: Ernesto Varghese Relationship:SON Phone Number: [phone] Actively involved in care? [x]  Yes  []  No 
 
Name:JULIO C Relationship: SON Phone [phone] Actively involved in care? [x]  Yes  []  No 
 
Name:WILSON Relationship:DAUGHTER Phone Number: Actively involved in care? [x]  Yes  []  No 
 
Social support systems: (select ONE best description) [x]  Excellent social support system which includes three or more family members or friends 
[]  Good social support system which includes two or less members or friends 
[]  [address] support which includes one family member or friend 
[]  Poor social support; no family members or friends; basically ALONE Notes:  
  
Emotional Status: (rubina all that apply) Patient Caregiver Response [x]                [x]    Mood/Affect stable and appropriate    
              []                []    Angry  
              []                []    Anxious []                []    Apprehensive []                []    Avoidant  
              []                []    Clinging  
              []                []    Depressed  
              []                []    Distraught  
              []                []    Elated []                []    Euphoric  
              []                []    Fearful  
              []                []    Flat Affect  
              []                []    Helpless []                []    Hostile []                []    Impulsive []                []    Irritable  
              []                []    Labile  
              []                []    Manic  
              []                []    Restlessness []                [x]    Sad  
              []                []    Suspicious []                []    Tearful  
              []                []    Withdrawn Notes: Coping Skills (strengths/weakness):  
 Patient: Coping Skills (strength/weakness): DEMENTIA, LETHARGIC, Family/caregiver (strength/weakness): REALISTIC, ACCEPTING,/ MULTIPLE LOSSES FOR ROLO, WHO JUST LOST A CLOSE FRIEND HE HAS KNOW FOR 42 YEARS 2 WEEKS AGO.  
  
[place] of care (rubina all that apply):    
[]  No burden evident  
[]  Family must administer medications  
[]  Illness causing financial strain  
[x]  Family/Support feels overwhelmed  
[]  Family/Support sleep disturbed with patients care  
[]  Patients care causes extra physical stress  of death 
[]  Illness causes changes in family lifestyle 
[]  Illness impacting family/support employment 
[]  Family experiencing increased time demands 
[]  Patients behavior endangers family 
[]  Denial of patients illness 
[]  Concern over outcome of illness/fear 
[]  Patients behavior embarrassing to family Notes:  
  
Risk Factors: (rubina all that apply):   
[x]  No burden evident  
[]  Alcohol abuse 
[]  Financial resources inadequate to meet basic needs (food/house/etc) []  Financial resources inadequate to meet health care needs (supplies/equipment/medications) 
[]  Food/nutrition resources inadequate 
[]  Home environment unsafe/inadequate for home care 
[]  Homicidal risk 
[]  Lives alone or without concerned relatives 
[]  Multiple medications/complex schedule 
[]  Physical limitations increase likelihood of falls 
[]  Plan of care/treatments complicated 
[]  Substance use/abuse 
[]  Suicidal risk 
[]  Visual impairment threatens safety/ability to perform self-care 
[]  Other (specify): 
  
Abuse/Neglect (actual/potential risks): 
[x]  No signs of abuse/neglect 
[]  History of abuse/neglect                 []  XBVJAEIS          []  Sexual 
[]  History of domestic violence 
[]  Lacks adequate physical care 
[]  Lacks emotional nurturing/support 
[]  Lacks appropriate stimulation/cognitive experiences 
[]  Left alone inappropriately []  Lacks necessary supervision 
[]  Inadequate or delayed medical care 
[]  Unsafe environment (i.e guns/drug use/history of violence in the home/etc.) []  Bruising or other physical signs of injury present 
[]  Other (specify): 
Notes:  
[]  Refer to child/adult protective services Current Sources of Stress (in Addition to Current Illness):  
[]  None reported 
[]  Bills/Debt   
[]  Career/Job change   
[]   (short term)   
[]   (long term)   
[]  Death of a child (recent)   
[]  Death of a parent (recent)  
[]  Death of a spouse (recent)  
[]  Employment status changed  
[]  Family discord   
[]  Financial loss/Inadequate inther (specify):come 
[]  Job loss 
[]  Legal issues unresolved 
[]  Lifestyle change 
[]  Marital discord 
[]  Marriage within the last year 
[]  Paperwork (insurance/legal/etc) overwhelming 
[]  Separation/Divorce [x]  Other (specify): DEATH OF A CLOSE FAMILY FRIEND  2 WEEKS AGO Notes:  
  
Current [employer] Being Utilized 1. Interventions/Plan of Care 1. Assess social and emotional factors related to coping with end of life issues 2. Community resource planning/referral  
3. Relocation to different care setting if/when symptoms stabilize 4. Discharge Planning PT HAS MEDICAID , FAMILY IN AGREEMENT WITH NH PLACEMENT, ROOL STATED [T HAS LTC MEDICAID  
 
MSW Assessment Completed by: Celio Holland 03/22/19 Time In:2;00 PM       
Time Out 4;00 PM

## 2019-03-22 NOTE — PROGRESS NOTES
Problem: Pressure Injury - Risk of 
Goal: *Prevention of pressure injury Description Document Keegan Scale and appropriate interventions in the flowsheet. Outcome: Progressing Towards Goal 
  
Problem: Patient Education: Go to Patient Education Activity Goal: Patient/Family Education Outcome: Progressing Towards Goal 
  
Problem: Patient Education: Go to Patient Education Activity Goal: Patient/Family Education Outcome: Progressing Towards Goal

## 2019-03-22 NOTE — DISCHARGE SUMMARY
Hospitalist Discharge Summary Patient ID: 
Radha Morgan [id number] 
75 y.o. 
3/7/1930 PCP on record: Bhargav Segura MD 
 
Admit date: 3/14/2019 Discharge date and time: 3/22/2019 DISCHARGE DIAGNOSIS: 
See below CONSULTATIONS: 
IP CONSULT TO HOSPITALIST 
IP CONSULT TO CARDIOLOGY 
IP CONSULT TO PALLIATIVE CARE - PROVIDER Excerpted HPI from H&P of Yamilet Wadsworth MD: 
Radha Morgan is a 80 y.o.  female with HTN, DM currently not on medication for DM, dementia, hx TIA, who is reported to be aphasic at baseline. No family at bedside. ______________________________________________________________________ DISCHARGE SUMMARY/HOSPITAL COURSE:  for full details see H&P, daily progress notes, labs, consult notes. Pt initially admitted with acute hypoxic respiratory failure requiring mechanical ventilation. Pt was diagnosed with Sepsis, secondary to R aspiration PNA and Klebsiella UTI. Pt also with CM, with EF of 35%. Pt experienced a Code Blue on 3/16 when noted to be in [address] arrest. 
Pt was compassionately extubated and has been pending a hospice eval. She has been accepted to inpatient hospice and is therefore being discharged. Refer to Dr. Liu Bob Progress Note from 3/21 for further details in regards to pt's hospitalization. _______________________________________________________________________ Patient seen and examined by me on discharge day. Pertinent Findings: 
Gen:    Obtunded, in significant distress Chest: Coarse CVS:   Increased rate, regular rhythm. No edema Abd:  Soft, not distended, not tender Neuro:  Obtunded, not following commands 
_______________________________________________________________________ DISCHARGE MEDICATIONS:  
Current Discharge Medication List  
  
STOP taking these medications  
  
 traZODone (DESYREL) 50 mg tablet Comments:  
Reason for Stopping: Cholecalciferol, Vitamin D3, 50,000 unit cap Comments:  
Reason for Stopping:   
   
 aspirin delayed-release 81 mg tablet Comments:  
Reason for Stopping:   
   
 hydrochlorothiazide (HYDRODIURIL) 25 mg tablet Comments:  
Reason for Stopping:   
   
 verapamil (CALAN) 240 mg capsule Comments:  
Reason for Stopping:   
   
  
 
 
______________________________________________________________________ Risk of deterioration: High 
 
Condition at Discharge:  Stable 
______________________________________________________________________ Disposition IP Hospice 
______________________________________________________________________ Care Plan discussed with:  
Patient, Family, RN, Care Manager, Consultant 
 
______________________________________________________________________ Code Status: DNR/DNI 
______________________________________________________________________ Follow up with: PCP : Allie Johns MD 
Follow-up Information Follow up With Specialties Details Why Contact Info Allie Johns MD Family Practice   [address] [place] [address] 
[phone] Total time in minutes spent coordinating this discharge (includes going over instructions, follow-up, prescriptions, and preparing report for sign off to her PCP) :  35 minutes Signed: 
Cordelia Bales MD

## 2019-03-23 NOTE — PROGRESS NOTES
[employer] [facility] Good Help to Those in Need 
[phone] Patient Name: Kylah Elias YOB: 1930 Date of Provider Hospice Visit: 03/23/19 Level of Care:   [x] General Inpatient (GIP)    [] Routine   [] Respite Current Location of Care: 
[] [facility] [] [facility] [] [facility] [] [facility] [] Hospice [facility] IF [facility], patient referred from: 
[] [facility] [] [facility] [] [facility] [] [facility] [] Home [] Other:  
 
Date of Original Hospice Admission: 3-22-19 Hospice Medical Director at time of admission: Dr. Paulla Fleischer Principle Hospice Diagnosis: Aspiration Pneumonia Diagnoses RELATED to the terminal prognosis:sepsis, respiratory failure, Klebsiella UTI Other Diagnoses: CHF ef 35%, dysphagia dementia Gail Spivey Do not cut and paste chart information other than imaging findings Kylah Elias is a 80y.o. year old who was admitted to [facility]. Pt with hx  CHF ef 35%Dementia, hx TIA , aphasic was admitted from home with nausea/vomiting  with hypoxic respiratory failure due to aspiration pneumonia. On 3-16-19 had code blue arrest with V-tach and intubated and managed in ICU. Treated for severe sepsis d/t aspiration and klebsiella UTI. Pt developed multiorgan failure and and family has opted for comfort care. Pt was compassionately extubated . Pt has had excess secretions and labored breathing requiring symptom medications. Rectal tube was placed for ongoing diarrhea. Morphine x 3 doses , ativan x 1 in last 24 hsr. Admitted to hospice GIP today The patient's principle diagnosis has resulted in respiratory failure , dyspnea Refer to LCD Functionally, the patient's Karnofsky and/or Palliative Performance Scale has declined over a period of days and is estimated at 10. The patient is dependent on the following ADLs:all Objective information that support this patients limited prognosis includes: CXR  3-17-19 IMPRESSION IMPRESSION: 
 Persistent consolidation throughout the right lung with volume loss. .   
 
The patient/family chose comfort measures with the support of Hospice. HOSPICE DIAGNOSES Active Symptoms: 1. Dyspnea 2. Excess secretions PLAN 1. Continue hospice GIP high risk for decline, symptom issues, IV medications, frequent assesments 2. Iv morphine 2mg q4 and prn 3. Ativan 1 mg iv q4 and prn 4. Medication as ordered for secretions 5.  and SW to support family needs 6. Disposition: to home with hospice but appears imminently dying Prognosis estimated based on 03/23/19 clinical assessment is:  
[x] Hours to Days   
[] Days to Weeks   
[] Other: 
 
Communicated plan of care with: Hospice Case Manager; Hospice IDT; Care Team 
 
 GOALS OF CARE Patient/Medical POA stated Goal of Care: comfort 
 
[] I have reviewed and/or updated ACP information in the Advance Care Planning Navigator. This information is available in the [address] [place] [address] Drive link in the patient's chart header. Primary Decision [address] (Health Care Agent):   Primary Decision Maker: Steve Kaur - [phone] Resuscitation Status: DNR If DNR is there a Durable DNR on file? : [] Yes [] No (If no, complete Durable DNR) HISTORY History obtained from: hospice RN 
 
CHIEF COMPLAINT:  
The patient is:  
[] Verbal 
[x] Nonverbal 
[x] Unresponsive HPI/SUBJECTIVE:   
 
nonresponsive but with labored breathing REVIEW OF SYSTEMS The following systems were: [] reviewed  [x] unable to be reviewed Positive ROS include: 
Constitutional: fatigue, weakness, in pain, short of breath Ears/nose/mouth/throat: increased airway secretions Respiratory:shortness of breath, wheezing Gastrointestinal:poor appetite, nausea, vomiting, abdominal pain, constipation, diarrhea Musculoskeletal:pain, deformities, swelling legs Neurologic:confusion, hallucinations, weakness Psychiatric:anxiety, feeling depressed, poor sleep Endocrine:  
 
Adult Non-Verbal Pain Assessment Score: Face [x] 0   No particular expression or smile 
[] 1   Occasional grimace, tearing, frowning, wrinkled forehead 
[] 2   Frequent grimace, tearing, frowning, wrinkled forehead Activity (movement) [x] 0   Lying quietly, normal position 
[] 1   Seeking attention through movement or slow, cautious movement 
[] 2   Restless, excessive activity and/or withdrawal reflexes Guarding 
[x] 0   Lying quietly, no positioning of hands over areas of body 
[] 1   Splinting areas of the body, tense 
[] 2   Rigid, stiff Physiology (vital signs) 
[] 0   Stable vital signs [] 1   Change in any of the following: SBP > 20mm Hg; HR > 20/minute 
[] 2   Change in any of the following: SBP > 30mm Hg; HR > 25/minute Respiratory 
[] 0   Baseline RR/SpO2, compliant with ventilator 
[x] 1   RR > 10 above baseline, or 5% drop SpO2, mild asynchrony with ventilator 
[] 2   RR > 20 above baseline, or 10% drop SpO2, asynchrony with ventilator FUNCTIONAL ASSESSMENT Palliative Performance Scale (PPS):10 
 
 
 PSYCHOSOCIAL/SPIRITUAL ASSESSMENT Active Problems: 
  Respiratory failure ([facility] [address] (3/22/2019) Past Medical History:  
Diagnosis Date  Alzheimer's dementia  Dementia  Diabetes ([facility] [address]  Hypertension  Stroke [facility]) 2011 TIA Past Surgical History:  
Procedure Laterality Date  HX GYN    
 hysterectomy Social History Tobacco Use  Smoking status: Never Smoker  Smokeless tobacco: Never Used Substance Use Topics  Alcohol use: No  
 
No family history on file. Allergies Allergen Reactions  Aspirin Other (comments) Daugher states no allergy  Pcn [Penicillins] Nausea and Vomiting Current Facility-Administered Medications Medication Dose Route Frequency  sodium chloride (NS) flush 5 mL  5 mL IntraVENous PRN  
 LORazepam (ATIVAN) injection 1 mg  1 mg IntraVENous Q15MIN PRN  
  LORazepam (ATIVAN) injection 1 mg  1 mg IntraVENous Q4H  
 acetaminophen (TYLENOL) suppository 650 mg  650 mg Rectal Q4H PRN  
 glycopyrrolate (ROBINUL) injection 0.2 mg  0.2 mg IntraVENous Q4H  
 scopolamine (TRANSDERM-SCOP) 1 mg over 3 days 1 Patch  1 Patch TransDERmal Q72H  bisacodyl (DULCOLAX) suppository 10 mg  10 mg Rectal DAILY PRN  
 morphine injection 2 mg  2 mg IntraVENous Q4H  
 morphine injection 2 mg  2 mg IntraVENous Q15MIN PRN  
 
 
 PHYSICAL EXAM  
 
Wt Readings from Last 3 Encounters:  
03/22/19 52.6 kg (116 lb)  
03/19/19 53 kg (116 lb 13.5 oz) 10/26/18 68 kg (150 lb) Visit Vitals /89 (BP 1 Location: Left arm, BP Patient Position: At rest) Pulse (!) 125 Temp 97.2 °F (36.2 °C) Resp 18 SpO2 96% Supplemental O2  [x] Yes  [] NO Last bowel movement:  
 
Currently this patient has: 
[x] Peripheral IV [] PICC  [] PORT [] ICD [x] Green Catheter [] NG Tube   [] PEG Tube   
[x] Rectal Tube [] Drain 
[] Other:  
 
Constitutional: obtunded, Eyes: perrl ENMT: srinivasan-clear Cardiovascular: rrr Respiratory: decreased bs at bases, labored breathing Gastrointestinal: soft, rectal tube with loose dark stools Musculoskeletal:no edema Skin:intact Neurologic:ontunded Psychiatric:  
Other:  
 
 
Pertinent Lab and or Imaging Tests: 
Lab Results Component Value Date/Time Sodium 146 (H) 03/19/2019 04:21 AM  
 Potassium 3.5 03/19/2019 04:21 AM  
 Chloride 116 (H) 03/19/2019 04:21 AM  
 CO2 19 (L) 03/19/2019 04:21 AM  
 Anion gap 11 03/19/2019 04:21 AM  
 Glucose 97 03/19/2019 04:21 AM  
 BUN 6 03/19/2019 04:21 AM  
 Creatinine 0.37 (L) 03/19/2019 04:21 AM  
 BUN/Creatinine ratio 16 03/19/2019 04:21 AM  
 GFR est AA >60 03/19/2019 04:21 AM  
 GFR est non-AA >60 03/19/2019 04:21 AM  
 Calcium 7.5 (L) 03/19/2019 04:21 AM  
 
Lab Results Component Value Date/Time  Protein, total 5.4 (L) 03/19/2019 04:21 AM  
 Albumin 1.7 (L) 03/19/2019 04:21 AM  
 
   
 
 Tiffanie Brown, NP

## 2019-03-23 NOTE — PROGRESS NOTES
Oncology End of Shift Note Bedside shift change report given to XOCHITL Lima (incoming nurse) by Olivia Ross (outgoing [facility]. Report included the following information SBAR, Kardex, Intake/Output and MAR. Shift Summary: Pt admitted to  hospice yesterday. Pt rested comfortably through out night. No PRNs needed. Suctioned Pt twice. Issues for Physician to Address:   
 
Patient on Cardiac Monitoring? [] Yes 
[x] No 
 
Rhythm:   
 
 
Olivia Ross

## 2019-03-23 NOTE — HOSPICE
PHYSICIANS [facility] Good Help to Those in Need 
[phone] [facility] Daily Nursing Note Patient Name: Bi Rowland YOB: 1930 Age: 80 y.o. Date of Visit: 03/23/19 Facility of Care: [facility] Patient Room: 1112/01 Hospice Attending: Jessy Roman MD 
Hospice Diagnosis: Respiratory failure [facility]) [J96.90] Level of Care: GIP Current GIP Symptoms 1. Respiratory distress 2. Terminal agitation 3. secretions ASSESSMENT & PLAN 1. continue GIP level of care for agitation, non-verbal pain, dyspnea, excessive secretions, high risk for rapid decline. 2. Morphine 2mg IV every 4 hrs and 15 mins PRN for pain and dyspnea. 3. Lorazepam 1mg every 4 hrs and 15 mins PRN for agitation/restlessness, anxiety. 4. Scopolamine Patch and Robinul every 4 hrs for excessive secretions. 5. Acetaminophen Supp PRN for fever 6.  and SW visits for Emotional and spiritual support. Spiritual Interventions: to be evaluated by hospice chaplain Psych/ Social/ Emotional Interventions: to be evaluated by hospice social worker Care Coordination Needs: communication between hospice team and hospital staff Care plan and New Orders discussed / approved with Anna Tabares NP for hospice. Description History and Chart Review Narrative History of last 24 hours that demonstrates care cannot be provided in another setting: 
Pt requiring skilled nursing assessment for appropriate use of PRN medications for optimal symptom management. IV medications due to severity of symptoms. What has been done to control the patient's symptoms in the last 24 hours? Pt symptoms tentatively improved with scheduled IV morphine, robinul and ativan. Does the patient currently require IV medications? yes Does the patient currently require scheduled medications? yes Does the patient currently require a PCA? no 
 
List number of doses of PRN medications in last 24 hours: 
Medication 1: 
Number of doses: Medication 2:  
Number of doses: 
 
Medication 3:  
Number of doses: Supporting documentation for GIP need for pain control: 
[] Frequent evaluation by a doctor, nurse practitioner, nurse  
[] Frequent medication adjustment   
[] IVs that cannot be administered at home  
[] Aggressive pain management  
[] Complicated technical delivery of medications Supporting documentation for GIP need for symptom control: 
[]  Sudden decline necessitating intensive nursing intervention 
[]  Uncontrolled / intractable nausea or vomiting  
[]  Pathological fractures 
[]  Advanced open wounds requiring frequent skilled care 
[] Unmanageable respiratory distress 
[] New or worsening delirium  
[] Delirium with behavior issues: Is 24 hour caregiver present due to safety concerns with agitation? (yes/no) [] Imminent death  with skilled nursing needs documented above DISCHARGE PLANNING Daily discharge planning required for GIP 1. Discharge Plan: alternate location of care to be discussed should pt stabilize 2. Patient/Family teaching: no family present at time of visit 3. Response to patient/family teaching:  
 
ASSESSMENT   
KARNOFSKY: 10% Prognosis estimated based on 03/23/19 clinical assessment is:  
[] Few to Many Hours [x] Hours to Days  
[] Few to Many Days  
[] Days to Weeks  
[] Few to Many Weeks  
[] Weeks to Months  
[] Few to Many Months Quality Measure: Patient self-reports:  [] Yes    [x] No 
 
 
CLINICAL INFORMATION Patient Vitals for the past 12 hrs: 
 Temp Pulse Resp BP SpO2  
03/23/19 0800 97.2 °F (36.2 °C) (!) 125 18 121/89 96 % Currently this patient has: 
[x] Supplemental O2 4L NC [x] IV   
[] PICC [] PORT  
[] NG Tube   
[] PEG Tube  
[] Ostomy    
[x] Green draining __60cc dark amber_____ urine 
[] Other:  
 
SIGNS/PHYSICAL FINDINGS Skin (including wound): 
[] Warm, dry, supple, intact and color normal for race [x] Warm  
[] Dry  
[] Cool    
[] Clammy [] Diaphoretic Turgor 
 [] Normal 
 [x] Decreased Color: 
 [] Pink [x] Pale 
 [] Cyanotic 
 [] Erythema 
 [] Jaundice [] Normal for Race 
[]  Wounds: 
 
Neuro: 
[] Lethargy 
[] Restlessness / agitation 
[] Confusion / delirium 
[] Hallucinations 
[] Responds to maximal stimulation 
[x] Unresponsive 
[] Seizures Cardiac:[] Dyspnea on Exertion 
[] JVD [] Murmur 
[] Palpitations [] Hypotension 
[] Hypertension 
[] Tachycardia [] Bradycardia 
[] Irregular HR 
[] Pulses Decreased 
[] Pulses Absent 
[] Edema:       (Location, Grade and Pitting) [] Mottling:      (Location) Respiratory:Breath sounds:  
 [x] Diminished 
 [] Wheeze [x] Rhonchi 
 [] Rales [x] Even and unlabored 
[] Labored:           
[] Cough 
 [] Non Productive 
 [] Productive 
  [] Description:          
[] Deep suctioned  
[] O2 at ___ LPM 
[] High flow oxygen greater than 10 LPM 
[] Bi-Pap GI 
[] Abdomen (describe) [] Ascites 
[] Nausea 
[] Vomiting 
[] Incontinent of bowels 
[] Bowel sounds (yes/no) [] Diarrhea 
[] Constipation (see above including last bowel movement) [] Checked for impaction 
[] Last BM Nutrition Diet:___NPO_______ Appetite:  
[] Good  
[] Fair  
[] Poor  
[] Tube Feeding  
[] Voiding 
[] Incontinent [x] Green Musculoskeletal 
[] Balance/[place] Unsteady  
[] Weak Strength:  
 [] Normal  
 [] Limited  
 [] Decreasing Activities:  
 [] Up as tolerated 
 [x] Bedridden  
 [] Specify: 
 
SAFETY [x] 24 hr. Caregiver [x] Side rails ? [x] Hospital bed  
[x] Reviewed Falls & Safety ALLERGIES AND MEDICATIONS Allergies: Allergies Allergen Reactions  Aspirin Other (comments) Daugher states no allergy  Pcn [Penicillins] Nausea and Vomiting Current Facility-Administered Medications Medication Dose Route Frequency  sodium chloride (NS) flush 5 mL  5 mL IntraVENous PRN  
 LORazepam (ATIVAN) injection 1 mg  1 mg IntraVENous Q15MIN PRN  
  LORazepam (ATIVAN) injection 1 mg  1 mg IntraVENous Q4H  
 acetaminophen (TYLENOL) suppository 650 mg  650 mg Rectal Q4H PRN  
 glycopyrrolate (ROBINUL) injection 0.2 mg  0.2 mg IntraVENous Q4H  
 scopolamine (TRANSDERM-SCOP) 1 mg over 3 days 1 Patch  1 Patch TransDERmal Q72H  bisacodyl (DULCOLAX) suppository 10 mg  10 mg Rectal DAILY PRN  
 morphine injection 2 mg  2 mg IntraVENous Q4H  
 morphine injection 2 mg  2 mg IntraVENous Q15MIN PRN Visit Time In: 12:00 Visit Time Out: 13:00

## 2019-03-24 NOTE — PROGRESS NOTES
Pt  at 0230. All paperwork completed. Report given to [facility]. Waiting for transport. Pt maybe transported from room straight to  home.

## 2019-03-24 NOTE — PROGRESS NOTES
. I was Called to pronounce patient. No response to noxious stimuli. No spontaneous breath sounds nor cardiac sounds. Pupils fixed and dilated. TOD: 0230 AM on March 24, 2019. Family,- MAKSIM Sheth([phone]) notified and grieving appropriately.   
D/C Summary, death certificate, and death note to be completed by Attending MD. 
Deepika Crouch MD

## 2019-03-24 NOTE — PROGRESS NOTES
responded to page for a death in Oncology.  arrived on unit and there was no family present. Consulted with  who said that family was not expected. They had been here during the day and were not coming back, but had been notified.  offered support to staff on unit.  informed staff if family does arrive to please page him again. Derek Patel Pager: [phone]

## 2019-03-24 NOTE — PROGRESS NOTES
Nurse in to round on pt. Pt found to have . No heart or breath sounds heard on ausculation. No response to stimuli. Nursing Supervisor informed. Supervisor stated she will call physician to pronounce. Nurse called Ivy Perea, and family members to inform of death. Once Physician is present to pronounce, life net will be called.

## 2019-03-24 NOTE — HOSPICE
[facility] Good Help to Those in Need 
[phone] Discharge/Death Nursing Note Patient Name: Alonzo Tan YOB: 1930 Age: 80 y.o. Date of Death: 19 Admitted Date: 3/22/2019 Time of Death: 18 Facility of Care: [facility] Level of Care: [facility] Patient Room: 111/ Hospice Attending: Waldo Adhikari MD 
Hospice Diagnosis: Respiratory failure [facility]) [J96.90] Death Pronouncement Pronouncement of death completed by: Dr. [employer] staff Chantal WATT) present at the time of death At the time of death the patient was documented as No response to noxious stimuli. No spontaneous breath sounds nor cardiac sounds. Pupils fixed and dilated. TOD: 0230 AM on 2019. The pt  within [facility] room 1112 The following were notified of the patient's death: family, physician, , [id number] [facility], Dr. Kiana Martin Medications were disposed of per facility protocol Discharge Summary Discharge Reason: Death Summary of Care Provided: 
 
[] Post mortem care provided by staff RN 
[] Notification of  home by nursing supervisor 
[] Referrals/Community resources provided:  
[] Goals completed 
[] Durable Medical Equipment vendor notified Disciplines involved: [x] RN [x] SW [x]  [] SHEPPARD [] Vol [] PT [] OT [] ST [] BC 
 
[x] IDT communication/notification Attending Physician, Dr. Gemini Alvarez, notified of death Bereaved Verify bereaved identified with name, address, telephone number and risk level Name Relation Home Work Mobile Dorette Page [phone] Jackie Rose Son [phone] Low bereavement risk per physician Advance Care Planning 3/18/2019 Patient's Healthcare Decision Maker is: Legal Next of Kin Confirm Advance Directive None Patient Would Like to Complete Advance Directive Unable

## 2019-03-24 NOTE — PROGRESS NOTES
RAPID RESPONSE TEAM 
 
Reviewed patient's chart due to MEWS 4. Patient has comfort care orders. No RRT involvement indicated at this time. Mel Sever Rapid Response RN Satya Almanza

## 2019-03-25 PROCEDURE — 0656 HSPC GENERAL INPATIENT

## 2019-03-26 PROCEDURE — 0656 HSPC GENERAL INPATIENT

## 2019-03-26 NOTE — DISCHARGE SUMMARY
Hospice Discharge Summary [facility] Good Help to Those in Need Date of Admission: 3/22/2019 Date of Discharge: 3/24/2019 James Cisneros is a 80y.o. year old who was admitted to [facility] at [address] with a Hospice diagnosis of Respiratory failure ([facility] [address] [J96.90]. The patient's care was focused on comfort and the patient passed away on 3/24/2019. Saud Tinoco is a 80y.o. year old who was admitted to [facility]. Pt with hx  CHF ef 35%Dementia, hx TIA , aphasic was admitted from home with nausea/vomiting  with hypoxic respiratory failure due to aspiration pneumonia. On 3-16-19 had code blue arrest with V-tach and intubated and managed in ICU. Treated for severe sepsis d/t aspiration and klebsiella UTI. Pt developed multiorgan failure and and family has opted for comfort care. Pt was compassionately extubated . Pt has had excess secretions and labored breathing requiring symptom medications. Rectal tube was placed for ongoing diarrhea. Morphine x 3 doses , ativan x 1 in last 24 hsr. Admitted to hospice GIP today Add in Hospice Summary through Plan from most recent Progress Note

## 2019-03-27 PROCEDURE — 0656 HSPC GENERAL INPATIENT

## 2019-03-28 PROCEDURE — 0656 HSPC GENERAL INPATIENT

## 2019-03-29 PROCEDURE — 0656 HSPC GENERAL INPATIENT

## 2019-03-30 PROCEDURE — 0656 HSPC GENERAL INPATIENT

## 2019-03-31 PROCEDURE — 0656 HSPC GENERAL INPATIENT

## 2021-07-07 NOTE — PROGRESS NOTES
PULMONARY ASSOCIATES [facility] INTENSIVIST Consult Service Note  Pulmonary, Critical Care, and Sleep Medicine    Name: Reid Chin MRN: [id number]   : 3/7/1930 Hospital: [address]   Date: 3/18/2019  Admission date: 3/14/2019 Hospital Day: 5       IMPRESSION:   1. Acute Respiratory Failure with Hypoxia  2. Abnormal CXR  3. Severe Sepsis with Shock   4. Extensive right sided aspiration pneumonia  5. Klebsiella UTI  6. Hypokalemia  7. Afib A flutter with RVR  8. Fecal stasis in rectum. 9. HTN   10. Dementia  11. H/o CVA per son  15. DM  13. Wheelchair bound  14. Decubitus buttock ulcer, POA and Sacral redness, POA per ER nurse  15. Additional workup outlined below  16. Multiorgan dysfunction as outlined above: Pt has one or more acute or chronic illnesses with severe exacerbation with progression or side effects of treatment that poses a threat to life or bodily function      RECOMMENDATIONS/PLAN:   1. CCU monitoring  2. adjust sedation  3. No CPR, No defib  4. Once extubated, will ask palliative care to clarify DDNR for discharge home? 5. Ventilator for mechanical life support and prevent respiratory arrest with protective lung strategies  6. Daily AM SAT,SBT   7. Agree with Empiric IV antibiotics pending culture results   8. Follow culture results  9. cardiology following  10. Transfuse prn to maintain Hgb > 7  11. Glucose monitoring and SSI  12. Replete electrolytes as needed  13. Labs to follow electrolytes, renal function and and blood counts  14. Bronchial hygiene with respiratory therapy techniques, bronchodilators  15. DVT, SUP prophylaxis  16. Will be available to assist in medical management while in the CCU pending disposition         Subjective/Initial History:   I have reviewed the flowsheet and previous days notes. Seen earlier today on rounds.     No acute events overnight  Still full vent support  No acute distress         ROS:Review of systems not obtained due to patient factors.        MEDS:   Current Facility-Administered Medications   Medication    dextrose (D50W) 50% injection syrg    dextrose (D50W) injection syrg 25 g    propofol (DIPRIVAN) 10 mg/mL injection    cefepime (MAXIPIME) 1 g in 0.9% sodium chloride (MBP/ADV) 50 mL    metroNIDAZOLE (FLAGYL) IVPB premix 500 mg    heparin (porcine) pf 300 Units    bacitracin 500 unit/gram packet 1 Packet    chlorhexidine (PERIDEX) 0.12 % mouthwash 15 mL    mupirocin (BACTROBAN) 2 % ointment    influenza vaccine 2018-19 (6 mos+)(PF) (FLUARIX QUAD/FLULAVAL QUAD) injection 0.5 mL    0.9% sodium chloride with KCl 20 mEq/L infusion    potassium, sodium phosphates (NEUTRA-PHOS) packet 2 Packet    dilTIAZem (CARDIZEM) IR tablet 30 mg    metoprolol tartrate (LOPRESSOR) tablet 12.5 mg    hydrALAZINE (APRESOLINE) 20 mg/mL injection 10 mg    acetaminophen (TYLENOL) tablet 650 mg    sodium chloride (NS) flush 5-10 mL    levalbuterol (XOPENEX) nebulizer soln 0.63 mg/3 mL    ipratropium (ATROVENT) 0.02 % nebulizer solution 0.5 mg        Current Facility-Administered Medications:     dextrose (D50W) 50% injection syrg, , , ,     dextrose (D50W) injection syrg 25 g, 25 g, IntraVENous, PRN, Josie Noonan MD    propofol (DIPRIVAN) 10 mg/mL injection, 0-50 mcg/kg/min, IntraVENous, TITRATE, Katiuska Ochoa, DO, Last Rate: 6.9 mL/hr at 03/18/19 0600, 25 mcg/kg/min at 03/18/19 0600    cefepime (MAXIPIME) 1 g in 0.9% sodium chloride (MBP/ADV) 50 mL, 1 g, IntraVENous, Q8H, Hayden Gilbert, DO, Last Rate: 100 mL/hr at 03/18/19 0116, 1 g at 03/18/19 0116    metroNIDAZOLE (FLAGYL) IVPB premix 500 mg, 500 mg, IntraVENous, Q12H, Hayden Gilbert B, DO, Last Rate: 100 mL/hr at 03/17/19 2032, 500 mg at 03/17/19 2032    heparin (porcine) pf 300 Units, 300 Units, InterCATHeter, PRN, Hayden Gilbert, DO    bacitracin 500 unit/gram packet 1 Packet, 1 Packet, Topical, PRN, Hayden Gilbert, DO    chlorhexidine (PERIDEX) 0.12 % mouthwash 15 mL, 15 mL, Oral, Q12H, Lesly Gilbertm DEEPTHI DO, 15 mL at 19    mupirocin (BACTROBAN) 2 % ointment, , Both Nostrils, Q12H, KathevinhLeodanHayden B, DO    influenza vaccine - (6 mos+)(PF) (FLUARIX QUAD/FLULAVAL QUAD) injection 0.5 mL, 0.5 mL, IntraMUSCular, PRIOR TO DISCHARGE, Kathevinh Hayden B, DO    0.9% sodium chloride with KCl 20 mEq/L infusion, , IntraVENous, CONTINUOUS, Madeleine Telles MD, Last Rate: 75 mL/hr at 19    potassium, sodium phosphates (NEUTRA-PHOS) packet 2 Packet, 2 Packet, Oral, QID, Ko, Kaz SALGADO, NP, 2 Packet at 19 2100    dilTIAZem (CARDIZEM) IR tablet 30 mg, 30 mg, Oral, AC&HS, Alexus Guillermo NP, Stopped at 19 2200    metoprolol tartrate (LOPRESSOR) tablet 12.5 mg, 12.5 mg, Oral, BID, EsmeAlexus castro NP, 12.5 mg at 19 1806    hydrALAZINE (APRESOLINE) 20 mg/mL injection 10 mg, 10 mg, IntraVENous, Q6H PRN, Alexus Guillermo NP, 10 mg at 19 0603    acetaminophen (TYLENOL) tablet 650 mg, 650 mg, Oral, Q6H PRN, Teja Crespo MD, 650 mg at 03/15/19 1735    sodium chloride (NS) flush 5-10 mL, 5-10 mL, IntraVENous, PRN, Sveta Reich MD, 10 mL at 19 0500    levalbuterol (XOPENEX) nebulizer soln 0.63 mg/3 mL, 0.63 mg, Nebulization, Q6H PRN, Teja Crespo MD    ipratropium (ATROVENT) 0.02 % nebulizer solution 0.5 mg, 0.5 mg, Nebulization, Q6H PRN, Teja Crespo MD      Objective:     Vital Signs: Telemetry:    AFIB Intake/Output:   Visit Vitals  BP (!) 107/37   Pulse (!) 56   Temp 97.5 °F (36.4 °C)   Resp 16   Ht 5' 6\" (1.676 m)   Wt 50.3 kg (110 lb 14.3 oz)   SpO2 97%   BMI 17.90 kg/m²       Temp (24hrs), Av °F (36.7 °C), Min:97.5 °F (36.4 °C), Max:98.7 °F (37.1 °C)        O2 Device: Endotracheal tube           Body mass index is 17.9 kg/m².     Wt Readings from Last 4 Encounters:   19 50.3 kg (110 lb 14.3 oz)   10/26/18 68 kg (150 lb)   18 68 kg (150 lb)   11 68 kg (150 lb)          Intake/Output Summary (Last 24 hours) at 3/18/2019 0740  Last data filed at 3/18/2019 0522  Gross per 24 hour   Intake 2295.29 ml   Output 962 ml   Net 1333.29 ml       Last shift:      No intake/output data recorded. Last 3 shifts: 03/16 1901 - 03/18 0700  In: 4403.9 [I.V.:3843.9]  Out: 1272 [Urine:1030; Drains:240]       Hemodynamics:    CO:    CI:    CVP:    SVR:   PAP Systolic:    PAP Diastolic:    PVR:    DV50:        Ventilator Settings:      Mode Rate TV Press PEEP FiO2 PIP Min. Vent   Assist control    400 ml 6 cm H2O  6 cm H20 40 %  25 cm H2O  6.84 l/min      Physical Exam:     General:  female; appears dehydrated and severely ill;  on vent;    HEAD: atraumatic   EYES: conjunctivae clear. PERRL, AN Icteric sclerae   NOSE: nares normal, no drainage, no nasal flaring,    THROAT: intubated; mucous membranes dry; Lips, mucosa dry;      Neck: Supple, symmetrical, trachea midline,  No accessory mm use; No Stridor/ cuff leak, No goiter or thyroid tenderness   LYMPH: No abnormally enlarged lymph nodes. in neck    Chest: normal   Lungs: rhonchi   Heart: IRRegular rate and rhythm; NO edema   Abdomen: soft, nontender   : krueger; clear urine; NO gross hematuria   Extremity: negative, clubbing; no joint swelling or erythema   Neuro: sedated; non verbal; withdraws to pain; unable to check gait and station; NOT following simple commands   Psych: Unable to assess;   No agitation;    Devices:per sepsis flow sheet- reviewed with team during IDR   Skin: Warm;    Pulses:Bilateral, Radial, 1+   Capillary refill: abnormal: ; sluggish capillary refill,      Labs:    Recent Labs     03/18/19  0421 03/17/19  0443 03/16/19  0817   WBC 7.2 7.9 14.1*   HGB 8.9* 8.9* 10.3*    153 165     Recent Labs     03/18/19  0415 03/17/19  0443 03/16/19  1832  03/16/19  0817   * 143 140   < >  --    K 3.6 3.2* 3.5   < >  --    * 114* 111*   < >  --    CO2 20* 20* 20*   < >  --    GLU 82 82 102*   < >  --    BUN 9 10 11   < > --    CREA 0.43* 0.30* 0.31*   < >  --    CA 7.5* 7.4* 7.5*   < >  --    MG 1.8 2.0  --   --  2.7*   PHOS 3.2 2.6  --   --   --    ALB 1.6* 1.7*  --   --   --    SGOT 20 29  --   --   --    ALT 15 19  --   --   --     < > = values in this interval not displayed. No results for input(s): PH, PCO2, PO2, HCO3, FIO2 in the last 72 hours. Recent Labs     03/16/19  0817 03/16/19  0622   CPK 95  --    CKNDX 3.3*  --    TROIQ 0.29* 0.18*     No results found for: BNPP, BNP   Lab Results   Component Value Date/Time    Culture result: MODERATE NORMAL RESPIRATORY LIZBETH SO FAR 03/16/2019 10:27 AM    Culture result: KLEBSIELLA PNEUMONIAE (A) 03/14/2019 09:01 AM    Culture result: NO GROWTH 3 DAYS 03/14/2019 08:40 AM      Lab Results   Component Value Date/Time    CK 95 03/16/2019 08:17 AM    CK 50 03/14/2019 04:33 PM       Imaging:  I have personally reviewed the patients radiographs and have reviewed the reports:  CXR: no acute changes      During this entire length of time the patient's condition was unstable, unpredictable and critically ill in the CCU/ ICU. I was immediately available to the patient whose care required several interactions with nursing, multidisciplinary team members leading to multiple interventions with fluid resuscitation and medication adjustments to optimize respiratory support, hemodynamic treatment, medication changes based on repeat labs results, reviews, exams and assessments. The reason for providing this level of medical care was due to a critical illness that impaired one or more vital organ systems, such that there was a high probability of sudden or life threatening deterioration in the patient's condition. This care involved high complexity medical decision making to treat acute and unstable vital organ system failure, and to prevent further life threatening deterioration of the patients condition.  I personally:  · Reviewed the flowsheet and previous days notes  · Reviewed and summarized records or history from previous days note or discussions with staff, family  · Parenteral controlled substances - Reviewed/ Adjusted / [place] / Started  · High Risk Drug therapy requiring intensive monitoring for toxicity: eg steroids, pressors, antibiotics  · Reviewed and/or ordered Clinical lab tests  · Reviewed and/or ordered Radiology tests  · Reviewed and/or ordered of Medicine tests  · Independently visualized radiologic Images  · Reviewed the patients ECG / Telemetry  · Reviewed and/or adjusted Ventilator / NiPPV settings  ·  discussed my assessment/management with : Consultants, Nursing, Respiratory Therapy, Family for coordination of care           Thank you for allowing us to participate in the care of this patient. We will follow along with you until they no longer require CCU services.     Johnathan Davis MD Asthma exacerbation

## 2022-04-25 NOTE — PROGRESS NOTES
15:17 pm,  Inbound call from [facility] Lise Sharif/Tommy) re: acceptance for [facility]. MAC informed Mery Mejia pt still hasn't been accepted at [facility]. Hospice liaison acknowledged understanding. Hospice liaison to contact [facility] to inquire if they allow outside hospice agencies into the facility. JAMIN Lr 
 
 
15:15 pm, Outbound F/U call to [facility] @ [phone]. MAC informed POA of [facility] [facility] not accepting his mother, and still awaiting an answer from [facility]. POA acknowledged understanding. JAMIN Lr 
 
 
15:02 pm, Outbound call to [facility]. Spoke to Jeffery" re: referral for acceptance. Mathew voiced he physically saw the patient, and reviewed her chart. Voiced concerns as following: 
 
-does the patient have a rectal tube? Wasn't able to assess this today.   
-is she more appropriate for inpatient hospice? Further states \"if I have to give an answer now, no we couldn't accept her. Give me a call tomorrow regarding patient\". SW acknowledged understanding. SW will consult w/attending physician. JAMIN Lr 
 
 
11:12 am, Inbound call from Mery Mejia @ [facility]). SW informed [facility] has declined to accept the patient. JAMIN rL 
 
0:915 am Pt's son Lucia Adjutant Amos/RANDALL) met w/sw to sign FOC for Hospice; [facility]; and 2nd IM Letter. POA also selected two other SNF [address] [place] [address].    
 
JAMIN Lr 
 negative...
